# Patient Record
Sex: MALE | Race: WHITE | NOT HISPANIC OR LATINO | Employment: PART TIME | URBAN - METROPOLITAN AREA
[De-identification: names, ages, dates, MRNs, and addresses within clinical notes are randomized per-mention and may not be internally consistent; named-entity substitution may affect disease eponyms.]

---

## 2017-01-23 ENCOUNTER — HOSPITAL ENCOUNTER (OUTPATIENT)
Dept: RADIOLOGY | Facility: HOSPITAL | Age: 72
Discharge: HOME | End: 2017-01-23
Attending: INTERNAL MEDICINE
Payer: MEDICARE

## 2017-01-23 DIAGNOSIS — R74.8 ELEVATED LIVER ENZYMES: ICD-10-CM

## 2017-01-23 PROCEDURE — 76705 ECHO EXAM OF ABDOMEN: CPT | Mod: TC

## 2017-01-23 PROCEDURE — 76705 ECHO EXAM OF ABDOMEN: CPT | Mod: 26,,, | Performed by: RADIOLOGY

## 2017-04-10 ENCOUNTER — HOSPITAL ENCOUNTER (OUTPATIENT)
Dept: CARDIOLOGY | Facility: HOSPITAL | Age: 72
Discharge: HOME | End: 2017-04-10
Attending: INTERNAL MEDICINE
Payer: MEDICARE

## 2017-04-10 DIAGNOSIS — I27.20 PULMONARY HYPERTENSION: ICD-10-CM

## 2017-04-10 LAB
DIASTOLIC DYSFUNCTION: NO
ESTIMATED PA SYSTOLIC PRESSURE: 45.45
GLOBAL PERICARDIAL EFFUSION: ABNORMAL
MITRAL VALVE MOBILITY: NORMAL
RETIRED EF AND QEF - SEE NOTES: 60 (ref 55–65)
TRICUSPID VALVE REGURGITATION: ABNORMAL

## 2017-04-10 PROCEDURE — 93306 TTE W/DOPPLER COMPLETE: CPT | Mod: 26,,, | Performed by: INTERNAL MEDICINE

## 2017-04-10 PROCEDURE — 93306 TTE W/DOPPLER COMPLETE: CPT

## 2017-10-09 PROBLEM — E78.5 HYPERLIPIDEMIA LDL GOAL <100: Status: ACTIVE | Noted: 2017-10-09

## 2017-10-09 PROBLEM — Z23 FLU VACCINE NEED: Status: ACTIVE | Noted: 2017-10-09

## 2017-10-09 PROBLEM — I10 HTN, GOAL BELOW 140/90: Status: ACTIVE | Noted: 2017-10-09

## 2017-10-09 PROBLEM — I27.20 PULMONARY HYPERTENSION: Status: ACTIVE | Noted: 2017-10-09

## 2017-10-09 PROBLEM — R74.8 ELEVATED LIVER ENZYMES: Status: ACTIVE | Noted: 2017-10-09

## 2018-02-19 PROBLEM — Z79.4 UNCONTROLLED TYPE 2 DIABETES MELLITUS WITH HYPERGLYCEMIA, WITH LONG-TERM CURRENT USE OF INSULIN: Status: ACTIVE | Noted: 2018-02-19

## 2018-02-19 PROBLEM — E11.65 UNCONTROLLED TYPE 2 DIABETES MELLITUS WITH HYPERGLYCEMIA, WITH LONG-TERM CURRENT USE OF INSULIN: Status: ACTIVE | Noted: 2018-02-19

## 2018-05-31 ENCOUNTER — PES CALL (OUTPATIENT)
Dept: ADMINISTRATIVE | Facility: CLINIC | Age: 73
End: 2018-05-31

## 2018-06-15 ENCOUNTER — OFFICE VISIT (OUTPATIENT)
Dept: INTERNAL MEDICINE | Facility: CLINIC | Age: 73
End: 2018-06-15
Payer: MEDICARE

## 2018-06-15 VITALS
TEMPERATURE: 98 F | HEART RATE: 75 BPM | DIASTOLIC BLOOD PRESSURE: 74 MMHG | OXYGEN SATURATION: 97 % | SYSTOLIC BLOOD PRESSURE: 138 MMHG | WEIGHT: 229.75 LBS | BODY MASS INDEX: 30.45 KG/M2 | HEIGHT: 73 IN

## 2018-06-15 VITALS
BODY MASS INDEX: 30.62 KG/M2 | HEIGHT: 73 IN | WEIGHT: 231.06 LBS | OXYGEN SATURATION: 98 % | DIASTOLIC BLOOD PRESSURE: 81 MMHG | SYSTOLIC BLOOD PRESSURE: 142 MMHG | TEMPERATURE: 98 F | HEART RATE: 72 BPM

## 2018-06-15 DIAGNOSIS — H91.93 DECREASED HEARING, BILATERAL: ICD-10-CM

## 2018-06-15 DIAGNOSIS — Z00.00 ENCOUNTER FOR PREVENTIVE HEALTH EXAMINATION: Primary | ICD-10-CM

## 2018-06-15 DIAGNOSIS — R73.03 BORDERLINE DIABETES MELLITUS: ICD-10-CM

## 2018-06-15 DIAGNOSIS — K76.0 FATTY LIVER: ICD-10-CM

## 2018-06-15 DIAGNOSIS — R73.09 ELEVATED HEMOGLOBIN A1C: Primary | ICD-10-CM

## 2018-06-15 DIAGNOSIS — Z85.828 HISTORY OF BASAL CELL CARCINOMA: ICD-10-CM

## 2018-06-15 DIAGNOSIS — R79.89 ELEVATED LFTS: ICD-10-CM

## 2018-06-15 DIAGNOSIS — Z87.19 HISTORY OF PANCREATITIS: ICD-10-CM

## 2018-06-15 DIAGNOSIS — E78.5 HYPERLIPIDEMIA LDL GOAL <100: ICD-10-CM

## 2018-06-15 DIAGNOSIS — H04.123 DRY EYES: ICD-10-CM

## 2018-06-15 DIAGNOSIS — I10 ESSENTIAL HYPERTENSION: ICD-10-CM

## 2018-06-15 DIAGNOSIS — I70.0 ATHEROSCLEROSIS OF AORTA: ICD-10-CM

## 2018-06-15 DIAGNOSIS — I27.20 PULMONARY HYPERTENSION: ICD-10-CM

## 2018-06-15 DIAGNOSIS — Z12.5 ENCOUNTER FOR SCREENING FOR MALIGNANT NEOPLASM OF PROSTATE: ICD-10-CM

## 2018-06-15 DIAGNOSIS — Z00.00 ENCOUNTER FOR HEALTH MAINTENANCE EXAMINATION: ICD-10-CM

## 2018-06-15 PROBLEM — Z23 FLU VACCINE NEED: Status: RESOLVED | Noted: 2017-10-09 | Resolved: 2018-06-15

## 2018-06-15 PROCEDURE — 99999 PR PBB SHADOW E&M-EST. PATIENT-LVL V: CPT | Mod: PBBFAC,,, | Performed by: INTERNAL MEDICINE

## 2018-06-15 PROCEDURE — 99499 UNLISTED E&M SERVICE: CPT | Mod: S$GLB,,, | Performed by: NURSE PRACTITIONER

## 2018-06-15 PROCEDURE — 99204 OFFICE O/P NEW MOD 45 MIN: CPT | Mod: S$GLB,,, | Performed by: INTERNAL MEDICINE

## 2018-06-15 PROCEDURE — 99499 UNLISTED E&M SERVICE: CPT | Mod: S$GLB,,, | Performed by: INTERNAL MEDICINE

## 2018-06-15 PROCEDURE — 3075F SYST BP GE 130 - 139MM HG: CPT | Mod: CPTII,S$GLB,, | Performed by: NURSE PRACTITIONER

## 2018-06-15 PROCEDURE — 3074F SYST BP LT 130 MM HG: CPT | Mod: CPTII,S$GLB,, | Performed by: INTERNAL MEDICINE

## 2018-06-15 PROCEDURE — 3078F DIAST BP <80 MM HG: CPT | Mod: CPTII,S$GLB,, | Performed by: NURSE PRACTITIONER

## 2018-06-15 PROCEDURE — G0439 PPPS, SUBSEQ VISIT: HCPCS | Mod: S$GLB,,, | Performed by: NURSE PRACTITIONER

## 2018-06-15 PROCEDURE — 99999 PR PBB SHADOW E&M-EST. PATIENT-LVL IV: CPT | Mod: PBBFAC,,, | Performed by: NURSE PRACTITIONER

## 2018-06-15 PROCEDURE — 3079F DIAST BP 80-89 MM HG: CPT | Mod: CPTII,S$GLB,, | Performed by: INTERNAL MEDICINE

## 2018-06-15 RX ORDER — LOSARTAN POTASSIUM 25 MG/1
25 TABLET ORAL DAILY
Qty: 90 TABLET | Refills: 3 | Status: SHIPPED | OUTPATIENT
Start: 2018-06-15 | End: 2018-09-14 | Stop reason: SINTOL

## 2018-06-15 NOTE — PROGRESS NOTES
Subjective:       Patient ID: Ezekiel Mueller is a 72 y.o. male.    Chief Complaint: Establish Care    HPI  73 y/o man with HTN, HLD, borderline diabetes, h/o basal cell carcinoma of eyelid here to establish care. Last saw Dr Leija 2/2018.  [facility] doesn't recall which provider, not in several years.  No acute issues.    Borderline diabetes - last A1c 6.3; elevated A1c on labs since 2016, never higher than 6.3.  On metformin 500mg BID (sometimes misses evening dose) - this was stopped due to elevated LFTs and then restarted.  Not following low-carb/healthy diet.   Used to walk 5 miles daily about 3 years ago but not since his hospitalization in 2015.  Walks more in the winter and when hunting, but not during the summer.    HTN - diagnosed just after [place] (~2005). On norvasc 10mg and HCTZ 25mg.  Used to be on lisinopril and another medication.     H/o pancreatitis 7/2015 - noted as possible gallstone pancreatitis on that admission, but patient had cholecystectomy in 2012; EUS performed during hospitalization did not show any stones in the biliary tree, so no ERCP was performed. Hospital course complicated by two episodes of hypoxic respiratory failure attributed to pulmonary edema related to pancreatitis / IV fluids given for treatment of pancreatitis.    Does have fatty liver with mild LFT elevation on last few sets of labs.    H/o olecranon bursitis - attributes to traumatic injury, this has resolved.    H/o BCC R eyelid, excision done 2/2015, hasn't seen dermatology since then. No new lesions that he has noted.    Reports that he [facility] about 10 [facility] ago which was normal.    No difficulty urinating.  Sleeps well at night.  Hasn't had Hep C screening  Reports he has [facility] in [facility] - last around 5 years ago or so.  Reports having had 2 polyps on the first c-scope, no polyps on second/most recent scope.  Thinks maybe told to repeat in  3 years.  May have had one pneumonia vaccine at [facility] but isn't sure    Review of Systems   Constitutional: Negative for activity change and unexpected weight change.   HENT: Negative for hearing loss, rhinorrhea and trouble swallowing.    Eyes: Negative for discharge and visual disturbance.   Respiratory: Negative for chest tightness and wheezing.    Cardiovascular: Negative for chest pain and palpitations.   Gastrointestinal: Negative for blood in stool, constipation, diarrhea and vomiting.   Endocrine: Negative for polydipsia and polyuria.   Genitourinary: Negative for difficulty urinating, hematuria and urgency.   Musculoskeletal: Negative for arthralgias, joint swelling and neck pain.   Neurological: Negative for weakness and headaches.   Psychiatric/Behavioral: Negative for confusion and dysphoric mood.         Past Medical History:   Diagnosis Date    Basal cell carcinoma 02/2015    R eyelid    Borderline diabetes 2016    Colon polyps     noted [facility], none on repeat    Elevated blood uric acid level 2016    Fatty liver     Hypertension     diagnosed 2005    Pancreatitis 07/2015    etiology unclear     Past Surgical History:   Procedure Laterality Date    CHOLECYSTECTOMY  2012    SKIN CANCER EXCISION       Family History   Problem Relation Age of Onset    Diabetes Mother     Heart disease Mother     Hypertension Mother     No Known Problems Father     No Known Problems Sister     No Known Problems Brother     No Known Problems Maternal Aunt     No Known Problems Maternal Uncle     No Known Problems Paternal Aunt     No Known Problems Paternal Uncle     No Known Problems Maternal Grandmother     No Known Problems Maternal Grandfather     No Known Problems Paternal Grandmother     No Known Problems Paternal Grandfather     Blindness Neg Hx     Glaucoma Neg Hx     Macular degeneration Neg Hx     Retinal detachment Neg Hx     Amblyopia Neg Hx     Cancer Neg Hx  "    Cataracts Neg Hx     Strabismus Neg Hx     Stroke Neg Hx     Thyroid disease Neg Hx        Social History   Substance Use Topics    Smoking status: Never Smoker    Smokeless tobacco: Never Used    Alcohol use No     Medications and allergies reviewed.     Objective:          Vitals:    06/15/18 1420   BP: (!) 142/81   BP Location: Left arm   Patient Position: Sitting   Pulse: 72   Temp: 97.7 °F (36.5 °C)   TempSrc: Oral   SpO2: 98%   Weight: 104.8 kg (231 lb 0.7 oz)   Height: 6' 1" (1.854 m)   Repeat BP consistent    Body mass index is 30.48 kg/m².  Physical Exam   Constitutional: He is oriented to person, place, and time. He appears well-developed and well-nourished. No distress.   HENT:   Head: Normocephalic and atraumatic.   Mouth/Throat: Oropharynx is clear and moist.   Eyes: Conjunctivae and EOM are normal. Pupils are equal, round, and reactive to light. No scleral icterus.   Neck: Neck supple.   Cardiovascular: Normal rate, regular rhythm, normal heart sounds and intact distal pulses.    No murmur heard.  Pulmonary/Chest: Effort normal and breath sounds normal. No respiratory distress.   Abdominal: Soft. Bowel sounds are normal. He exhibits no distension. There is no tenderness.   Musculoskeletal: He exhibits no edema or tenderness.   Lymphadenopathy:     He has no cervical adenopathy.   Neurological: He is alert and oriented to person, place, and time. No cranial nerve deficit or sensory deficit.   Skin: Skin is warm and dry.   Psychiatric: He has a normal mood and affect.   Vitals reviewed.      Lab Results   Component Value Date    WBC 6.3 10/09/2017    HGB 15.1 10/09/2017    HCT 45.1 10/09/2017     10/09/2017    CHOL 197 10/09/2017    TRIG 126 10/09/2017    HDL 50 10/09/2017    ALT 86 (H) 10/09/2017    AST 57 (H) 10/09/2017     10/09/2017    K 3.8 10/09/2017    CL 99 10/09/2017    CREATININE 1.01 10/09/2017    BUN 17 10/09/2017    CO2 27 10/09/2017    INR 1.0 04/16/2015    HGBA1C " 6.3 (H) 10/09/2017       Assessment:       1. Elevated hemoglobin A1c    2. Essential hypertension    3. Fatty liver    4. Elevated LFTs    5. History of pancreatitis    6. History of basal cell carcinoma    7. Dry eyes    8. Encounter for health maintenance examination    9. Encounter for screening for malignant neoplasm of prostate        Plan:   Ezekiel was seen today for establish care.    Diagnoses and all orders for this visit:    Reviewed record and previous diagnoses with patient, educated re: borderline diabetes, fatty liver. Counseled re: healthy high-fiber low-carb diet and increasing exercise.    Elevated hemoglobin A1c - on metformin, continue this for now, will monitor.   -     CBC Without Differential; Future  -     Hemoglobin A1c; Future    Essential hypertension - above goal; discussed options. Recommended monitor at home, bring in BP log to next visit. Will start low-dose losartan  -     Lipid panel; Future  -     losartan (COZAAR) 25 MG tablet; Take 1 tablet (25 mg total) by mouth once daily. For blood pressure  -     Basic metabolic panel; Future    Fatty liver - concern for NAFLD given hepatic steatosis + elevated LFTs. Recommended see hepatology for further evaluation and fibroscan  -     CBC Without Differential; Future  -     Lipid panel; Future  -     Ambulatory Referral to Hepatology    Elevated LFTs  -     CBC Without Differential; Future  -     Comprehensive metabolic panel; Future  -     Lipid panel; Future  -     Ambulatory Referral to Hepatology    History of pancreatitis - reviewed in chart as noted, unclear etiology    History of basal cell carcinoma - recommended regular skin checks with dermatologist, will refer  -     Ambulatory Referral to Dermatology    Encounter for health maintenance examination  -     Hepatitis C antibody; Future  -     PSA, Screening; Future    Encounter for screening for malignant neoplasm of prostate - discussed guidelines for prostate cancer screening; will  do PSA with next labs and plan for prostate exam next visit.  -     PSA, Screening; Future    Health maintenance reviewed with patient.   Outside records of vaccines, colonoscopy, [facility]  Labs except for lipids, BMP in 1-2 weeks; lipid panel + BMP in 3 months  Follow-up in about 3 months (around 9/15/2018) for hypertension, ASCVD risk, annual physical.    Al Harvey MD  Internal Medicine  [facility]  6/15/2018

## 2018-06-15 NOTE — PATIENT INSTRUCTIONS

## 2018-06-15 NOTE — Clinical Note
Kandi - this patient says he has had [facility]. Can you help to get these records?  Thanks! Al Harvey MD

## 2018-06-15 NOTE — PATIENT INSTRUCTIONS
Counseling and Referral of Other Preventative  (Italic type indicates deductible and co-insurance are waived)    Patient Name: Ezekiel Mueller  Today's Date: 6/15/2018    Health Maintenance       Date Due Completion Date    Hepatitis C Screening 1945 ---    TETANUS VACCINE 10/27/1964 ---    Colonoscopy 10/27/1996 ---    Zoster Vaccine 10/27/2006 ---    Pneumococcal (65+) (1 of 2 - PCV13) 10/27/2011 ---    Influenza Vaccine 08/01/2018 10/9/2017    Lipid Panel 10/09/2022 10/9/2017        No orders of the defined types were placed in this encounter.    The following information is provided to all patients.  This information is to help you find resources for any of the problems found today that may be affecting your health:                Living healthy guide: www.[facility].[place].gov      Understanding Diabetes: www.diabetes.org      Eating healthy: www.cdc.gov/healthyweight      CDC home safety checklist: www.cdc.gov/steadi/patient.html      Agency on Aging: www.goea.[place].gov      Alcoholics anonymous (AA): www.aa.org      Physical Activity: www.ghazala.nih.gov/qg0mjud      Tobacco use: www.quitwithusla.org

## 2018-06-18 PROBLEM — I70.0 ATHEROSCLEROSIS OF AORTA: Status: ACTIVE | Noted: 2018-06-18

## 2018-06-18 NOTE — PROGRESS NOTES
"Ezekiel Mueller presented for a  Medicare AWV and comprehensive Health Risk Assessment today. The following components were reviewed and updated:    · Medical history  · Family History  · Social history  · Allergies and Current Medications  · Health Risk Assessment  · Health Maintenance  · Care Team     ** See Completed Assessments for Annual Wellness Visit within the encounter summary.**       The following assessments were completed:  · Living Situation  · CAGE  · Depression Screening  · Timed Get Up and Go  · Whisper Test  · Cognitive Function Screening  ·   ·   · Nutrition Screening  · ADL Screening  · PAQ Screening    Vitals:    06/15/18 1608   BP: 138/74   Pulse: 75   Temp: 98 °F (36.7 °C)   SpO2: 97%   Weight: 104.2 kg (229 lb 11.5 oz)   Height: 6' 1" (1.854 m)     Body mass index is 30.31 kg/m².  Physical Exam   Constitutional: He is oriented to person, place, and time. He appears well-developed and well-nourished.   HENT:   Head: Normocephalic and atraumatic.   Nose: Nose normal.   Eyes: Conjunctivae and EOM are normal.   Cardiovascular: Normal rate, regular rhythm, normal heart sounds and intact distal pulses.    Pulmonary/Chest: Effort normal and breath sounds normal.   Musculoskeletal: Normal range of motion.   Neurological: He is alert and oriented to person, place, and time.   Skin: Skin is warm and dry.   Psychiatric: He has a normal mood and affect. His behavior is normal. Judgment and thought content normal.   Nursing note and vitals reviewed.        Diagnoses and health risks identified today and associated recommendations/orders:    1. Encounter for preventive health examination  Assessment performed. Health maintenance updated. Chart review completed.  Discussed vaccines due. Labs ordered by PCP. Discussed colonoscopy due.    2. Decreased hearing, bilateral  - Ambulatory Referral to Audiology    3. Pulmonary hypertension  Chronic. Stable. Followed by PCP.  2 D Echo 2017.    4. Essential " hypertension  Chronic. Stable on current regimen. Followed by PCP.    5. Borderline diabetes mellitus  Chronic. Stable on current regimen. Followed by PCP.    6. History of pancreatitis  Chronic. Stable on current regimen. Followed by PCP.    7. History of basal cell carcinoma  Chronic. Stable on current regimen. Followed by Dermatology.    8. Hyperlipidemia LDL goal <100  Chronic. Stable on current regimen. Followed by PCP.    9. Atherosclerosis of aorta  Noted on imaging. Continue on current regimen. Followed by PCP.      Provided Ezekiel with a 5-10 year written screening schedule and personal prevention plan. Recommendations were developed using the USPSTF age appropriate recommendations. Education, counseling, and referrals were provided as needed. After Visit Summary printed and given to patient which includes a list of additional screenings\tests needed.    Follow-up for follow up with Primary Care Provider as instructed, ;sooner if problems, HRA in 1 year.    KIRK Galvin

## 2018-06-25 ENCOUNTER — LAB VISIT (OUTPATIENT)
Dept: LAB | Facility: HOSPITAL | Age: 73
End: 2018-06-25
Attending: INTERNAL MEDICINE
Payer: MEDICARE

## 2018-06-25 DIAGNOSIS — R73.09 ELEVATED HEMOGLOBIN A1C: ICD-10-CM

## 2018-06-25 DIAGNOSIS — Z00.00 ENCOUNTER FOR HEALTH MAINTENANCE EXAMINATION: ICD-10-CM

## 2018-06-25 DIAGNOSIS — R79.89 ELEVATED LFTS: ICD-10-CM

## 2018-06-25 DIAGNOSIS — Z12.5 ENCOUNTER FOR SCREENING FOR MALIGNANT NEOPLASM OF PROSTATE: ICD-10-CM

## 2018-06-25 DIAGNOSIS — K76.0 FATTY LIVER: ICD-10-CM

## 2018-06-25 LAB
ALBUMIN SERPL BCP-MCNC: 4.5 G/DL
ALP SERPL-CCNC: 49 U/L
ALT SERPL W/O P-5'-P-CCNC: 53 U/L
ANION GAP SERPL CALC-SCNC: 10 MMOL/L
AST SERPL-CCNC: 40 U/L
BILIRUB SERPL-MCNC: 0.6 MG/DL
BUN SERPL-MCNC: 16 MG/DL
CALCIUM SERPL-MCNC: 10.1 MG/DL
CHLORIDE SERPL-SCNC: 102 MMOL/L
CO2 SERPL-SCNC: 29 MMOL/L
COMPLEXED PSA SERPL-MCNC: 0.51 NG/ML
CREAT SERPL-MCNC: 1.1 MG/DL
ERYTHROCYTE [DISTWIDTH] IN BLOOD BY AUTOMATED COUNT: 13.8 %
EST. GFR  (AFRICAN AMERICAN): >60 ML/MIN/1.73 M^2
EST. GFR  (NON AFRICAN AMERICAN): >60 ML/MIN/1.73 M^2
ESTIMATED AVG GLUCOSE: 123 MG/DL
GLUCOSE SERPL-MCNC: 121 MG/DL
HBA1C MFR BLD HPLC: 5.9 %
HCT VFR BLD AUTO: 43.6 %
HCV AB SERPL QL IA: NEGATIVE
HGB BLD-MCNC: 14.8 G/DL
MCH RBC QN AUTO: 28.1 PG
MCHC RBC AUTO-ENTMCNC: 33.9 G/DL
MCV RBC AUTO: 83 FL
PLATELET # BLD AUTO: 197 K/UL
PMV BLD AUTO: 12.1 FL
POTASSIUM SERPL-SCNC: 3.8 MMOL/L
PROT SERPL-MCNC: 7.8 G/DL
RBC # BLD AUTO: 5.26 M/UL
SODIUM SERPL-SCNC: 141 MMOL/L
WBC # BLD AUTO: 6 K/UL

## 2018-06-25 PROCEDURE — 86803 HEPATITIS C AB TEST: CPT

## 2018-06-25 PROCEDURE — 83036 HEMOGLOBIN GLYCOSYLATED A1C: CPT

## 2018-06-25 PROCEDURE — 84153 ASSAY OF PSA TOTAL: CPT

## 2018-06-25 PROCEDURE — 36415 COLL VENOUS BLD VENIPUNCTURE: CPT

## 2018-06-25 PROCEDURE — 80053 COMPREHEN METABOLIC PANEL: CPT

## 2018-06-25 PROCEDURE — 85027 COMPLETE CBC AUTOMATED: CPT

## 2018-06-26 ENCOUNTER — LAB VISIT (OUTPATIENT)
Dept: LAB | Facility: HOSPITAL | Age: 73
End: 2018-06-26
Payer: MEDICARE

## 2018-06-26 ENCOUNTER — INITIAL CONSULT (OUTPATIENT)
Dept: DERMATOLOGY | Facility: CLINIC | Age: 73
End: 2018-06-26
Payer: MEDICARE

## 2018-06-26 ENCOUNTER — INITIAL CONSULT (OUTPATIENT)
Dept: HEPATOLOGY | Facility: CLINIC | Age: 73
End: 2018-06-26
Payer: MEDICARE

## 2018-06-26 VITALS
RESPIRATION RATE: 18 BRPM | HEIGHT: 73 IN | DIASTOLIC BLOOD PRESSURE: 85 MMHG | SYSTOLIC BLOOD PRESSURE: 159 MMHG | HEART RATE: 69 BPM | TEMPERATURE: 96 F | OXYGEN SATURATION: 96 % | WEIGHT: 224.19 LBS | BODY MASS INDEX: 29.71 KG/M2

## 2018-06-26 DIAGNOSIS — Z12.83 SCREENING EXAM FOR SKIN CANCER: Primary | ICD-10-CM

## 2018-06-26 DIAGNOSIS — L82.0 INFLAMED SEBORRHEIC KERATOSIS: ICD-10-CM

## 2018-06-26 DIAGNOSIS — R74.8 ELEVATED LIVER ENZYMES: Primary | ICD-10-CM

## 2018-06-26 DIAGNOSIS — R74.8 ELEVATED LIVER ENZYMES: ICD-10-CM

## 2018-06-26 DIAGNOSIS — Z85.828 HISTORY OF NONMELANOMA SKIN CANCER: ICD-10-CM

## 2018-06-26 DIAGNOSIS — K76.0 FATTY (CHANGE OF) LIVER, NOT ELSEWHERE CLASSIFIED: ICD-10-CM

## 2018-06-26 LAB
ALBUMIN SERPL BCP-MCNC: 4.4 G/DL
ALP SERPL-CCNC: 54 U/L
ALT SERPL W/O P-5'-P-CCNC: 55 U/L
ANION GAP SERPL CALC-SCNC: 9 MMOL/L
AST SERPL-CCNC: 39 U/L
BASOPHILS # BLD AUTO: 0.05 K/UL
BASOPHILS NFR BLD: 0.8 %
BILIRUB DIRECT SERPL-MCNC: 0.2 MG/DL
BILIRUB SERPL-MCNC: 0.5 MG/DL
BUN SERPL-MCNC: 19 MG/DL
CALCIUM SERPL-MCNC: 10.2 MG/DL
CERULOPLASMIN SERPL-MCNC: 27 MG/DL
CHLORIDE SERPL-SCNC: 103 MMOL/L
CO2 SERPL-SCNC: 29 MMOL/L
CREAT SERPL-MCNC: 1 MG/DL
DIFFERENTIAL METHOD: NORMAL
EOSINOPHIL # BLD AUTO: 0.3 K/UL
EOSINOPHIL NFR BLD: 4.1 %
ERYTHROCYTE [DISTWIDTH] IN BLOOD BY AUTOMATED COUNT: 13.3 %
EST. GFR  (AFRICAN AMERICAN): >60 ML/MIN/1.73 M^2
EST. GFR  (NON AFRICAN AMERICAN): >60 ML/MIN/1.73 M^2
GLUCOSE SERPL-MCNC: 109 MG/DL
HBV CORE AB SERPL QL IA: NEGATIVE
HBV SURFACE AG SERPL QL IA: NEGATIVE
HCT VFR BLD AUTO: 44 %
HEPATITIS A ANTIBODY, IGG: POSITIVE
HGB BLD-MCNC: 14.8 G/DL
IGG SERPL-MCNC: 1391 MG/DL
IMM GRANULOCYTES # BLD AUTO: 0.01 K/UL
IMM GRANULOCYTES NFR BLD AUTO: 0.2 %
INR PPP: 1
IRON SERPL-MCNC: 68 UG/DL
LYMPHOCYTES # BLD AUTO: 1.8 K/UL
LYMPHOCYTES NFR BLD: 28.3 %
MCH RBC QN AUTO: 28.7 PG
MCHC RBC AUTO-ENTMCNC: 33.6 G/DL
MCV RBC AUTO: 85 FL
MONOCYTES # BLD AUTO: 0.7 K/UL
MONOCYTES NFR BLD: 10.3 %
NEUTROPHILS # BLD AUTO: 3.6 K/UL
NEUTROPHILS NFR BLD: 56.3 %
NRBC BLD-RTO: 0 /100 WBC
PLATELET # BLD AUTO: 190 K/UL
PMV BLD AUTO: 11.7 FL
POTASSIUM SERPL-SCNC: 4.1 MMOL/L
PROT SERPL-MCNC: 8.2 G/DL
PROTHROMBIN TIME: 10.8 SEC
RBC # BLD AUTO: 5.16 M/UL
SATURATED IRON: 17 %
SODIUM SERPL-SCNC: 141 MMOL/L
TOTAL IRON BINDING CAPACITY: 400 UG/DL
TRANSFERRIN SERPL-MCNC: 270 MG/DL
WBC # BLD AUTO: 6.33 K/UL

## 2018-06-26 PROCEDURE — 86790 VIRUS ANTIBODY NOS: CPT

## 2018-06-26 PROCEDURE — 80053 COMPREHEN METABOLIC PANEL: CPT

## 2018-06-26 PROCEDURE — 17110 DESTRUCTION B9 LES UP TO 14: CPT | Mod: S$GLB,,, | Performed by: DERMATOLOGY

## 2018-06-26 PROCEDURE — 80321 ALCOHOLS BIOMARKERS 1OR 2: CPT

## 2018-06-26 PROCEDURE — 82390 ASSAY OF CERULOPLASMIN: CPT

## 2018-06-26 PROCEDURE — 86704 HEP B CORE ANTIBODY TOTAL: CPT

## 2018-06-26 PROCEDURE — 86235 NUCLEAR ANTIGEN ANTIBODY: CPT

## 2018-06-26 PROCEDURE — 99203 OFFICE O/P NEW LOW 30 MIN: CPT | Mod: 25,S$GLB,, | Performed by: DERMATOLOGY

## 2018-06-26 PROCEDURE — 86038 ANTINUCLEAR ANTIBODIES: CPT

## 2018-06-26 PROCEDURE — 82248 BILIRUBIN DIRECT: CPT

## 2018-06-26 PROCEDURE — 87340 HEPATITIS B SURFACE AG IA: CPT

## 2018-06-26 PROCEDURE — 3078F DIAST BP <80 MM HG: CPT | Mod: CPTII,S$GLB,, | Performed by: DERMATOLOGY

## 2018-06-26 PROCEDURE — 85610 PROTHROMBIN TIME: CPT

## 2018-06-26 PROCEDURE — 99214 OFFICE O/P EST MOD 30 MIN: CPT | Mod: S$GLB,,, | Performed by: PHYSICIAN ASSISTANT

## 2018-06-26 PROCEDURE — 3079F DIAST BP 80-89 MM HG: CPT | Mod: CPTII,S$GLB,, | Performed by: PHYSICIAN ASSISTANT

## 2018-06-26 PROCEDURE — 85025 COMPLETE CBC W/AUTO DIFF WBC: CPT

## 2018-06-26 PROCEDURE — 82103 ALPHA-1-ANTITRYPSIN TOTAL: CPT

## 2018-06-26 PROCEDURE — 86256 FLUORESCENT ANTIBODY TITER: CPT | Mod: 91

## 2018-06-26 PROCEDURE — 36415 COLL VENOUS BLD VENIPUNCTURE: CPT

## 2018-06-26 PROCEDURE — 82784 ASSAY IGA/IGD/IGG/IGM EACH: CPT

## 2018-06-26 PROCEDURE — 86706 HEP B SURFACE ANTIBODY: CPT

## 2018-06-26 PROCEDURE — 99999 PR PBB SHADOW E&M-EST. PATIENT-LVL II: CPT | Mod: PBBFAC,,, | Performed by: DERMATOLOGY

## 2018-06-26 PROCEDURE — 3077F SYST BP >= 140 MM HG: CPT | Mod: CPTII,S$GLB,, | Performed by: PHYSICIAN ASSISTANT

## 2018-06-26 PROCEDURE — 3077F SYST BP >= 140 MM HG: CPT | Mod: CPTII,S$GLB,, | Performed by: DERMATOLOGY

## 2018-06-26 PROCEDURE — 83540 ASSAY OF IRON: CPT

## 2018-06-26 PROCEDURE — 99999 PR PBB SHADOW E&M-EST. PATIENT-LVL IV: CPT | Mod: PBBFAC,,, | Performed by: PHYSICIAN ASSISTANT

## 2018-06-26 RX ORDER — ASPIRIN 81 MG/1
81 TABLET ORAL DAILY
COMMUNITY
End: 2022-01-28

## 2018-06-26 NOTE — LETTER
June 26, 2018      Al Harvey MD  [address]  [place] [address]           [facility] - Dermatology  [address]  [place] [address]  Phone: [phone]  Fax: [phone]          Patient: Ezekiel Mueller   MR Number: [id number]   YOB: 1945   Date of Visit: 6/26/2018       Dear Dr. Al Harvey:    Thank you for referring Ezekiel Mueller to me for evaluation. Attached you will find relevant portions of my assessment and plan of care.    If you have questions, please do not hesitate to call me. I look forward to following Ezekiel Mueller along with you.    Sincerely,    Jeny Kelly MD    Enclosure  CC:  No Recipients    If you would like to receive this communication electronically, please contact [email or web address] or [phone] to request more information on [employer] access.    For providers and/or their staff who would like to refer a patient to [facility], please contact us through our one-stop-shop provider referral line, [facility], at [phone].    If you feel you have received this communication in error or would no longer like to receive these types of communications, please e-mail [email or web address]

## 2018-06-26 NOTE — LETTER
June 28, 2018      Al Harvey MD  [address]  [facility] [address]           [facility] - Hepatitis [facility]  [address]  [facility] [address]  Phone: [phone]  Fax: [phone]          Patient: Ezekiel Mueller   MR Number: [id number]   YOB: 1945   Date of Visit: 6/26/2018       Dear Dr. Al Harvey:    Thank you for referring Ezekiel Mueller to me for evaluation. Attached you will find relevant portions of my assessment and plan of care.    If you have questions, please do not hesitate to call me. I look forward to following Ezekiel Mueller along with you.    Sincerely,    GIANLUCA Chunosure  CC:  No Recipients    If you would like to receive this communication electronically, please contact [email or web address] or [phone] to request more information on [employer] access.    For providers and/or their staff who would like to refer a patient to [facility], please contact us through our one-stop-shop provider referral line, [facility], at [phone].    If you feel you have received this communication in error or would no longer like to receive these types of communications, please e-mail [email or web address]

## 2018-06-26 NOTE — PROGRESS NOTES
HEPATOLOGY CLINIC VISIT NOTE     REFERRING PROVIDER: Dr. Al Harvey    REASON FOR VISIT: elevated liver enzymes     HISTORY: This is a 72 y.o. White male here for initial evaluation. He was referred by PCP. He has a h/o elevated transaminases since 02/2016. On most recent labs, AST 40, ALT 53. He has normal synthetic liver function. His PLTs are preserved. He has had negative HCV screening in the past. He denies drug use. He denies ETOH use. He denies FH of liver disease.     He had an U/S 01/2017 noting hepatic steatosis. He has not had formal fibrosis staging.     PMH is significant for HTN, HLD, and prediabetes. His BMI is 29.     Liver staging:  No formal staging  AST 40, ALT 53  Tbili WNL  Albumin WNL  PLTs 190    Denies jaundice, dark urine, abdominal distention, hematemesis, melena, slowed mentation.   No abnormal skin rashes. No generalized joint pain.                   Past Medical History:   Diagnosis Date    Basal cell carcinoma 02/2015    R eyelid    Borderline diabetes 2016    Colon polyps     [facility], none on repeat    Elevated blood uric acid level 2016    Fatty liver     Hypertension     diagnosed 2005    Pancreatitis 07/2015    etiology unclear     Past Surgical History:   Procedure Laterality Date    CHOLECYSTECTOMY  2012    COLONOSCOPY      x2    SKIN CANCER EXCISION       FAMILY HISTORY: Negative for liver disease    SOCIAL HISTORY:   History   Smoking Status    Never Smoker   Smokeless Tobacco    Never Used     History   Alcohol Use No     History   Drug Use No     ROS:   No fever, chills, (+) intentional weight loss  No chest pain, dyspnea, cough  No abdominal pain, nausea, vomiting  No headaches, visual changes  No lower extremity edema  No depression or anxiety      PHYSICAL EXAM:  Friendly White male, in no acute distress; alert and oriented to person, place and time  VITALS: reviewed  HEENT: Sclerae anicteric.   NECK: Supple  CVS: Regular  rate and rhythm. No murmurs  LUNGS: Normal respiratory effort. Clear bilaterally  ABDOMEN: Flat, soft, nontender. No organomegaly or masses. No ascites or hernias.   SKIN: Warm and dry. No jaundice, No obvious rashes.   EXTREMITIES: No lower extremity edema  NEURO/PSYCH: Normal gate. Memory intact. Thought and speech pattern appropriate. Behavior normal. No depression or anxiety noted.    RECENT LABS:  Lab Results   Component Value Date    WBC 6.00 06/25/2018    HGB 14.8 06/25/2018     06/25/2018     Lab Results   Component Value Date    INR 1.0 04/16/2015     Lab Results   Component Value Date    AST 40 06/25/2018    ALT 53 (H) 06/25/2018    BILITOT 0.6 06/25/2018    ALBUMIN 4.5 06/25/2018    ALKPHOS 49 (L) 06/25/2018    CREATININE 1.1 06/25/2018    BUN 16 06/25/2018     06/25/2018    K 3.8 06/25/2018     RECENT IMAGING:  U/S abdomen 01/2017Narrative     Limited abdominal ultrasound    Comparison: None    Results: The liver is enlarged in size measuring 18.8 cm.  It demonstrates increased echogenicity throughout and attenuates the sound consistent with fatty liver infiltration.The common duct measures 4 mm , which is normal.  The gallbladder has been removed.  The pancreas is obscured by overlying intestinal gas.  The right kidney measures 11.9 x 6.6 x 6.0  cm.  There is a small cyst in the lower pole measuring 1.6 cm.  No ascites.   Impression          1. Cholecystectomy.  2.  Mild hepatomegaly and fatty liver infiltration.  3.  Small right kidney cyst.     ASSESSMENT  72 y.o. White male with:  1. ELEVATED LIVER ENZYMES  -- suspect NAFLD/ALANIS  -- full serological work up  -- stage fibrosis with fibroscan  -- risk factors: prediabetes, HTN, and HLD  -- discussed diet, exercise and weight loss    EDUCATION:  We discussed the manifestations of NAFLD. At this time there is no FDA approved therapy for NAFLD.   The patient and I discussed the importance of diet, exercise, and weight loss for management of  NAFLD. We discussed a low fat, low carb/low sugar, high fiber diet, and a goal of 30 minutes of exercise 5 times per week.   Pt is aware that fatty liver can progress to steatohepatitis and possibly to cirrhosis, putting one at increased risk for liver cancer, liver failure, and death.        PLAN:  1. Labs today  2. Fibroscan  3. F/u TBD based on results     Thank you for allowing me to participate in the care of Ezekiel Berry PA-C

## 2018-06-26 NOTE — PROGRESS NOTES
Subjective:       Patient ID:  Ezekiel Mueller is a 72 y.o. male who presents for   Chief Complaint   Patient presents with    Skin Check     tbse     HPI  73 yo male history of right eyelid BCC treated with Mohs 2015 here for skin check.  The patient denies any moles or growths of the skin that are rapidly growing, hurting, itching, bleeding, or changing colors.  Has 2 itchy spots near temples on both sides would like removed.    Review of Systems   Skin: Positive for activity-related sunscreen use and wears hat.   Hematologic/Lymphatic: Does not bruise/bleed easily.        Objective:    Physical Exam   Constitutional: He appears well-developed and well-nourished. No distress.   Neurological: He is alert and oriented to person, place, and time. He is not disoriented.   Psychiatric: He has a normal mood and affect.   Skin:   Areas Examined (abnormalities noted in diagram):   Scalp / Hair Palpated and Inspected  Head / Face Inspection Performed  Neck Inspection Performed  Chest / Axilla Inspection Performed  Abdomen Inspection Performed  Genitals / Buttocks / Groin Inspection Performed  Back Inspection Performed  RUE Inspected  LUE Inspection Performed  RLE Inspected  LLE Inspection Performed  Nails and Digits Inspection Performed                   Diagram Legend     Erythematous scaling macule/papule c/w actinic keratosis       Vascular papule c/w angioma      Pigmented verrucoid papule/plaque c/w seborrheic keratosis      Yellow umbilicated papule c/w sebaceous hyperplasia      Irregularly shaped tan macule c/w lentigo     1-2 mm smooth white papules consistent with Milia      Movable subcutaneous cyst with punctum c/w epidermal inclusion cyst      Subcutaneous movable cyst c/w pilar cyst      Firm pink to brown papule c/w dermatofibroma      Pedunculated fleshy papule(s) c/w skin tag(s)      Evenly pigmented macule c/w junctional nevus     Mildly variegated pigmented, slightly irregular-bordered macule c/w  mildly atypical nevus      Flesh colored to evenly pigmented papule c/w intradermal nevus       Pink pearly papule/plaque c/w basal cell carcinoma      Erythematous hyperkeratotic cursted plaque c/w SCC      Surgical scar with no sign of skin cancer recurrence      Open and closed comedones      Inflammatory papules and pustules      Verrucoid papule consistent consistent with wart     Erythematous eczematous patches and plaques     Dystrophic onycholytic nail with subungual debris c/w onychomycosis     Umbilicated papule    Erythematous-base heme-crusted tan verrucoid plaque consistent with inflamed seborrheic keratosis     Erythematous Silvery Scaling Plaque c/w Psoriasis     See annotation      Assessment / Plan:        Screening exam for skin cancer  Area of previous NMSC examined. Site well healed with no signs of recurrence.    Total body skin examination performed today including at least 12 points as noted in physical examination. No lesions suspicious for malignancy noted.    History of nonmelanoma skin cancer  Patient instructed in importance in daily sun protection of at least spf 30. Sun avoidance and topical protection discussed.     Patient encouraged to wear hat for all outdoor exposure.     Also discussed sun protective clothing.      Inflamed seborrheic keratosis  Cryosurgery procedure note:    Verbal consent from the patient is obtained including, but not limited to, risk of hypopigmentation/hyperpigmentation, scar, recurrence of lesion. Liquid nitrogen cryosurgery is applied to 2 lesions to produce a freeze injury. The patient is aware that blisters may form and is instructed on wound care with gentle cleansing and use of vaseline ointment to keep moist until healed. The patient is supplied a handout on cryosurgery and is instructed to call if lesions do not completely resolve.             Follow-up in about 1 year (around 6/26/2019) for for TBSE.

## 2018-06-27 LAB
ANA SER QL IF: NORMAL
HBV SURFACE AB SER-ACNC: NEGATIVE M[IU]/ML
MITOCHONDRIA AB TITR SER IF: NORMAL {TITER}

## 2018-06-28 LAB
A1AT PHENOTYP SERPL-IMP: NORMAL BANDS
A1AT SERPL NEPH-MCNC: 136 MG/DL
SMOOTH MUSCLE AB TITR SER IF: NORMAL {TITER}

## 2018-06-29 DIAGNOSIS — Z12.11 COLON CANCER SCREENING: ICD-10-CM

## 2018-07-05 LAB — PHOSPHATIDYLETHANOL (PETH): NEGATIVE NG/ML

## 2018-07-10 ENCOUNTER — PROCEDURE VISIT (OUTPATIENT)
Dept: HEPATOLOGY | Facility: CLINIC | Age: 73
End: 2018-07-10
Attending: PHYSICIAN ASSISTANT
Payer: MEDICARE

## 2018-07-10 ENCOUNTER — PATIENT MESSAGE (OUTPATIENT)
Dept: HEPATOLOGY | Facility: CLINIC | Age: 73
End: 2018-07-10

## 2018-07-10 DIAGNOSIS — R74.8 ELEVATED LIVER ENZYMES: ICD-10-CM

## 2018-07-10 PROCEDURE — 91200 LIVER ELASTOGRAPHY: CPT | Mod: S$GLB,,, | Performed by: PHYSICIAN ASSISTANT

## 2018-07-10 NOTE — PROCEDURES
Procedures   Fibroscan Procedure     Name: Ezekiel Mueller  Date of Procedure : 07/10/2018   :: Genaro Berry PA-C  Diagnosis: NAFLD    Probe: XL    Fibroscan readin.1 KPa    Fibrosis:F1-F2     CAP readin dB/m    Steatosis: :S3

## 2018-07-30 DIAGNOSIS — E11.65 CONTROLLED TYPE 2 DIABETES MELLITUS WITH HYPERGLYCEMIA, WITHOUT LONG-TERM CURRENT USE OF INSULIN: ICD-10-CM

## 2018-07-30 RX ORDER — METFORMIN HYDROCHLORIDE 500 MG/1
500 TABLET ORAL 2 TIMES DAILY WITH MEALS
Qty: 180 TABLET | Refills: 3 | Status: SHIPPED | OUTPATIENT
Start: 2018-07-30 | End: 2018-07-30 | Stop reason: SDUPTHER

## 2018-07-30 NOTE — TELEPHONE ENCOUNTER
----- Message from Radha Roa sent at 7/30/2018  2:12 PM CDT -----  Contact: wife/tavo/[phone]  Pt wife called in regards to getting a Rx refill for metformin (GLUCOPHAGE) 500 MG tablet 180 tablet 3 10/18/2017 10/18/2018 No Take 1 tablet (500 mg total) by mouth 2 (two) times daily with meals    [facility] [phone]  Please advise

## 2018-08-02 ENCOUNTER — OFFICE VISIT (OUTPATIENT)
Dept: INTERNAL MEDICINE | Facility: CLINIC | Age: 73
End: 2018-08-02
Payer: MEDICARE

## 2018-08-02 VITALS
DIASTOLIC BLOOD PRESSURE: 92 MMHG | HEART RATE: 71 BPM | HEIGHT: 73 IN | WEIGHT: 230.19 LBS | BODY MASS INDEX: 30.51 KG/M2 | SYSTOLIC BLOOD PRESSURE: 152 MMHG | OXYGEN SATURATION: 97 %

## 2018-08-02 DIAGNOSIS — E79.0 HYPERURICEMIA: ICD-10-CM

## 2018-08-02 DIAGNOSIS — M10.9 ACUTE GOUT OF LEFT FOOT, UNSPECIFIED CAUSE: Primary | ICD-10-CM

## 2018-08-02 PROCEDURE — 3080F DIAST BP >= 90 MM HG: CPT | Mod: CPTII,S$GLB,, | Performed by: NURSE PRACTITIONER

## 2018-08-02 PROCEDURE — 99999 PR PBB SHADOW E&M-EST. PATIENT-LVL III: CPT | Mod: PBBFAC,,, | Performed by: NURSE PRACTITIONER

## 2018-08-02 PROCEDURE — 99213 OFFICE O/P EST LOW 20 MIN: CPT | Mod: S$GLB,,, | Performed by: NURSE PRACTITIONER

## 2018-08-02 PROCEDURE — 3077F SYST BP >= 140 MM HG: CPT | Mod: CPTII,S$GLB,, | Performed by: NURSE PRACTITIONER

## 2018-08-02 RX ORDER — INDOMETHACIN 50 MG/1
50 CAPSULE ORAL
Qty: 21 CAPSULE | Refills: 0 | Status: SHIPPED | OUTPATIENT
Start: 2018-08-02 | End: 2018-08-09

## 2018-08-02 NOTE — PROGRESS NOTES
INTERNAL MEDICINE URGENT CARE NOTE    CHIEF COMPLAINT     Chief Complaint   Patient presents with    Foot Pain     left foot pain since thursday       HPI     Ezekiel Mueller is a 72 y.o. male with pulmonary htn, htn, hld, atherosclerosis of arota, IFG, and hyperuricemia  who presents for an urgent visit today.  He is an established pt of Dr Harvey.     Here with c/o left foot pain x 1 week. Started suddenly 1 week ago. With redness and swelling to the left forefoot.  NO fever or chills. No injury or trauma. Reports swelling and redness have decreased slightly.     Past Medical History:  Past Medical History:   Diagnosis Date    Basal cell carcinoma 02/2015    R eyelid    Borderline diabetes 2016    [facility]     [facility]    Elevated blood uric acid level 2016    Fatty liver     Hypertension     diagnosed 2005    Pancreatitis 07/2015    etiology unclear       Home Medications:  Prior to Admission medications    Medication Sig Start Date End Date Taking? Authorizing Provider   amLODIPine (NORVASC) 10 MG tablet TAKE 1 TABLET ONE TIME DAILY 12/13/17  Yes Joshua Leija MD   aspirin (ECOTRIN) 81 MG EC tablet Take 81 mg by mouth once daily.   Yes Historical Provider, MD   blood sugar diagnostic Strp 1 strip by Misc.(Non-Drug; Combo Route) route once daily. 2/20/18  Yes Joshua Leija MD   hydroCHLOROthiazide (HYDRODIURIL) 25 MG tablet TAKE 1 TABLET EVERY DAY 4/2/18  Yes Joshua Leija MD   lancets (TRUEPLUS LANCETS) 33 gauge Misc 1 lancet by Misc.(Non-Drug; Combo Route) route 3 (three) times daily as needed. 2/26/18  Yes Joshua Leija MD   lancing device (BD LANCET DEVICE) Misc 1 Units by Misc.(Non-Drug; Combo Route) route once daily. Please dispense lancets and strips - # 100 2/19/18 2/19/19 Yes Joshua Leija MD   losartan (COZAAR) 25 MG tablet Take 1 tablet (25 mg total) by mouth once daily. For blood pressure 6/15/18 6/15/19 Yes Al Havrey MD  "  metFORMIN (GLUCOPHAGE) 500 MG tablet TAKE 1 TABLET TWICE DAILY WITH MEALS 7/30/18  Yes Joshua Leija MD   blood-glucose meter Misc 1 kit by Misc.(Non-Drug; Combo Route) route 3 (three) times daily. 2/20/18 6/15/18  Joshua Leija MD   erythromycin (ROMYCIN) ophthalmic ointment Place into the left eye every evening. 2/19/18   Joshua Leija MD       Review of Systems:  Review of Systems   Constitutional: Negative for chills, fatigue and fever.   Respiratory: Negative for cough, shortness of breath and wheezing.    Gastrointestinal: Negative for abdominal pain, constipation, diarrhea, nausea and vomiting.   Genitourinary: Negative for difficulty urinating, hematuria and urgency.   Musculoskeletal: Negative for myalgias.   Skin: Positive for color change.   Neurological: Negative for dizziness, light-headedness and headaches.       Health Maintainence:   Immunizations:  Health Maintenance       Date Due Completion Date    TETANUS VACCINE 10/27/1964 ---    High Dose Statin 10/27/1967 ---    Colonoscopy 10/27/1996 ---    Zoster Vaccine 10/27/2006 ---    Pneumococcal (65+) (1 of 2 - PCV13) 10/27/2011 ---    Influenza Vaccine 08/01/2018 10/9/2017    Lipid Panel 10/09/2022 10/9/2017           PHYSICAL EXAM     BP (!) 152/92 (BP Location: Right arm, Patient Position: Sitting, BP Method: Large (Manual))   Pulse 71   Ht 6' 1" (1.854 m)   Wt 104.4 kg (230 lb 2.6 oz)   SpO2 97%   BMI 30.37 kg/m²     Physical Exam   Constitutional: He is oriented to person, place, and time. He appears well-developed and well-nourished.   HENT:   Head: Normocephalic and atraumatic.   Eyes: Pupils are equal, round, and reactive to light.   Cardiovascular: Normal rate and regular rhythm.    Pulmonary/Chest: Effort normal.   Musculoskeletal:        Left foot: There is tenderness and swelling. There is normal range of motion, no bony tenderness, normal capillary refill, no crepitus, no deformity and no laceration.        " Feet:    Neurological: He is alert and oriented to person, place, and time.   Psychiatric: He has a normal mood and affect.   Vitals reviewed.      LABS     Lab Results   Component Value Date    HGBA1C 5.9 (H) 06/25/2018     CMP  Sodium   Date Value Ref Range Status   06/26/2018 141 136 - 145 mmol/L Final     Potassium   Date Value Ref Range Status   06/26/2018 4.1 3.5 - 5.1 mmol/L Final     Chloride   Date Value Ref Range Status   06/26/2018 103 95 - 110 mmol/L Final     CO2   Date Value Ref Range Status   06/26/2018 29 23 - 29 mmol/L Final     Glucose   Date Value Ref Range Status   06/26/2018 109 70 - 110 mg/dL Final     BUN, Bld   Date Value Ref Range Status   06/26/2018 19 8 - 23 mg/dL Final     Creatinine   Date Value Ref Range Status   06/26/2018 1.0 0.5 - 1.4 mg/dL Final     Calcium   Date Value Ref Range Status   06/26/2018 10.2 8.7 - 10.5 mg/dL Final     Total Protein   Date Value Ref Range Status   06/26/2018 8.2 6.0 - 8.4 g/dL Final     Albumin   Date Value Ref Range Status   06/26/2018 4.4 3.5 - 5.2 g/dL Final     Total Bilirubin   Date Value Ref Range Status   06/26/2018 0.5 0.1 - 1.0 mg/dL Final     Comment:     For infants and newborns, interpretation of results should be based  on gestational age, weight and in agreement with clinical  observations.  Premature Infant recommended reference ranges:  Up to 24 hours.............<8.0 mg/dL  Up to 48 hours............<12.0 mg/dL  3-5 days..................<15.0 mg/dL  6-29 days.................<15.0 mg/dL       Alkaline Phosphatase   Date Value Ref Range Status   06/26/2018 54 (L) 55 - 135 U/L Final     AST   Date Value Ref Range Status   06/26/2018 39 10 - 40 U/L Final     ALT   Date Value Ref Range Status   06/26/2018 55 (H) 10 - 44 U/L Final     Anion Gap   Date Value Ref Range Status   06/26/2018 9 8 - 16 mmol/L Final     eGFR if    Date Value Ref Range Status   06/26/2018 >60.0 >60 mL/min/1.73 m^2 Final     eGFR if non African  American   Date Value Ref Range Status   06/26/2018 >60.0 >60 mL/min/1.73 m^2 Final     Comment:     Calculation used to obtain the estimated glomerular filtration  rate (eGFR) is the CKD-EPI equation.        Lab Results   Component Value Date    WBC 6.33 06/26/2018    HGB 14.8 06/26/2018    HCT 44.0 06/26/2018    MCV 85 06/26/2018     06/26/2018     Lab Results   Component Value Date    CHOL 197 10/09/2017    CHOL 193 12/12/2016    CHOL 171 02/01/2016     Lab Results   Component Value Date    HDL 50 10/09/2017    HDL 50 12/12/2016    HDL 43 02/01/2016     Lab Results   Component Value Date    LDLCALC 122 (H) 10/09/2017    LDLCALC 120 (H) 12/12/2016    LDLCALC 102.4 02/01/2016     Lab Results   Component Value Date    TRIG 126 10/09/2017    TRIG 114 12/12/2016    TRIG 128 02/01/2016     Lab Results   Component Value Date    CHOLHDL 25.1 02/01/2016    CHOLHDL 32.9 07/22/2015     No results found for: TSH, R1XHOTJ, A3SPOGZ, THYROIDAB    ASSESSMENT/PLAN     Ezekiel Mueller is a 72 y.o. male with  Past Medical History:   Diagnosis Date    Basal cell carcinoma 02/2015    R eyelid    Borderline diabetes 2016    [facility]ps     [facility]    Elevated blood uric acid level 2016    Fatty liver     Hypertension     diagnosed 2005    Pancreatitis 07/2015    etiology unclear     Acute gout of left foot, unspecified cause- will start indomethacin tid with meal. Avoid high purine foods. Elevated foot.   -     indomethacin (INDOCIN) 50 MG capsule; Take 1 capsule (50 mg total) by mouth 3 (three) times daily with meals. for 7 days  Dispense: 21 capsule; Refill: 0    Hyperuricemia  -     indomethacin (INDOCIN) 50 MG capsule; Take 1 capsule (50 mg total) by mouth 3 (three) times daily with meals. for 7 days  Dispense: 21 capsule; Refill: 0      Follow up as needed and with PCP     Patient education provided from [facility]. Patient was counseled on when and how to seek emergent care.        Meredith DELUCA, MONI, FNP-c   Department of Internal Medicine - [facility]  9:22 AM

## 2018-08-03 ENCOUNTER — TELEPHONE (OUTPATIENT)
Dept: INTERNAL MEDICINE | Facility: CLINIC | Age: 73
End: 2018-08-03

## 2018-08-03 NOTE — TELEPHONE ENCOUNTER
This prescription was not order by Dr Harvey. Medication ordered by Andres POOLE. Message sent back to the practice.

## 2018-08-03 NOTE — TELEPHONE ENCOUNTER
"----- Message from Mel Zendejas sent at 8/3/2018 10:10 AM CDT -----  Contact: [id number] [phone]  Prior Authorization Needed    Rx: indomethacin (INDOCIN) 50 MG capsule    To submit the PA:    1: Go to " [email or web address] " and click "Enter a Key"    2. Enter the patient's last name and date of birth and the key.      KEY: [id number]    3. Complete the forms and click "send to Plan"    Note chart when prior authorization has been submitted.    Please notify pharmacy when prior authorization has been approved.    Thank You    "

## 2018-08-07 ENCOUNTER — TELEPHONE (OUTPATIENT)
Dept: INTERNAL MEDICINE | Facility: CLINIC | Age: 73
End: 2018-08-07

## 2018-08-07 NOTE — TELEPHONE ENCOUNTER
"----- Message from Mel Janet Aashish sent at 8/7/2018  8:36 AM CDT -----  2nd Request    Prior Authorization Needed     Rx: indomethacin (INDOCIN) 50 MG capsule     To submit the PA:     1: Go to " [email or web address] " and click "Enter a Key"     2. Enter the patient's last name and date of birth and the key.                            KEY: [id number]     3. Complete the forms and click "send to Plan"     Note chart when prior authorization has been submitted.     Please notify pharmacy when prior authorization has been approved.     Thank You  "

## 2018-08-07 NOTE — TELEPHONE ENCOUNTER
"Prior authorization for Indomethacin is approved.    "This request has received a Favorable outcome.    Please note any additional information provided by [employer] at the bottom of your screen.    You will also receive a faxed copy of the determination."  "

## 2018-09-12 ENCOUNTER — DOCUMENTATION ONLY (OUTPATIENT)
Dept: ADMINISTRATIVE | Facility: HOSPITAL | Age: 73
End: 2018-09-12

## 2018-09-14 ENCOUNTER — OFFICE VISIT (OUTPATIENT)
Dept: INTERNAL MEDICINE | Facility: CLINIC | Age: 73
End: 2018-09-14
Payer: MEDICARE

## 2018-09-14 ENCOUNTER — IMMUNIZATION (OUTPATIENT)
Dept: INTERNAL MEDICINE | Facility: CLINIC | Age: 73
End: 2018-09-14
Payer: MEDICARE

## 2018-09-14 VITALS
HEART RATE: 64 BPM | SYSTOLIC BLOOD PRESSURE: 130 MMHG | HEIGHT: 73 IN | TEMPERATURE: 98 F | OXYGEN SATURATION: 96 % | DIASTOLIC BLOOD PRESSURE: 80 MMHG | WEIGHT: 220 LBS | BODY MASS INDEX: 29.16 KG/M2

## 2018-09-14 DIAGNOSIS — R74.8 ELEVATED LIVER ENZYMES: ICD-10-CM

## 2018-09-14 DIAGNOSIS — Z00.00 ANNUAL PHYSICAL EXAM: Primary | ICD-10-CM

## 2018-09-14 DIAGNOSIS — E79.0 HYPERURICEMIA: ICD-10-CM

## 2018-09-14 DIAGNOSIS — E66.3 OVERWEIGHT (BMI 25.0-29.9): ICD-10-CM

## 2018-09-14 DIAGNOSIS — R73.01 IMPAIRED FASTING BLOOD SUGAR: ICD-10-CM

## 2018-09-14 DIAGNOSIS — E78.5 HYPERLIPIDEMIA LDL GOAL <100: ICD-10-CM

## 2018-09-14 DIAGNOSIS — I10 ESSENTIAL HYPERTENSION: ICD-10-CM

## 2018-09-14 PROCEDURE — 99214 OFFICE O/P EST MOD 30 MIN: CPT | Mod: PBBFAC,25 | Performed by: INTERNAL MEDICINE

## 2018-09-14 PROCEDURE — 3079F DIAST BP 80-89 MM HG: CPT | Mod: CPTII,,, | Performed by: INTERNAL MEDICINE

## 2018-09-14 PROCEDURE — 99999 PR PBB SHADOW E&M-EST. PATIENT-LVL IV: CPT | Mod: PBBFAC,,, | Performed by: INTERNAL MEDICINE

## 2018-09-14 PROCEDURE — 90662 IIV NO PRSV INCREASED AG IM: CPT | Mod: PBBFAC

## 2018-09-14 PROCEDURE — 3075F SYST BP GE 130 - 139MM HG: CPT | Mod: CPTII,,, | Performed by: INTERNAL MEDICINE

## 2018-09-14 PROCEDURE — 99397 PER PM REEVAL EST PAT 65+ YR: CPT | Mod: S$PBB,,, | Performed by: INTERNAL MEDICINE

## 2018-09-14 RX ORDER — LOSARTAN POTASSIUM 25 MG/1
12.5 TABLET ORAL DAILY
Qty: 45 TABLET | Refills: 3
Start: 2018-09-14 | End: 2019-04-11 | Stop reason: SDUPTHER

## 2018-09-14 NOTE — PATIENT INSTRUCTIONS
Please check your blood pressure at home while taking your amlodipine and 1/2 tablet of losartan, and NOT the hydrochlorothiazide.     I recommended check with pharmacy re: insurance coverage for new shingles vaccine (Shingrix) -- if this is covered, I do recommend that you get this vaccine.       Eating to Prevent Gout  Gout is a painful form of arthritis caused by an excess of uric acid. This is a waste product made by the body. It builds up in the body and forms crystals that collect in the joints, bringing on a gout attack. Alcohol and certain foods can trigger a gout attack. Below are some guidelines for changing your diet to help you manage gout. Your healthcare provider can work with you to determine the best eating plan for you. Know that diet is only one part of managing gout. Take your medicines as prescribed and follow the other guidelines your healthcare provider has given you.  Foods to limit  Eating too many foods containing purines may increase the levels of uric acid in your body and increase your risk for a gout attack. It may be best to limit these high-purine foods:  · Alcohol (beer, red wine). You may be told to avoid alcohol completely.  · Certain fish (anchovies, sardines, fish roes, herring, tuna, mussels, codfish, scallops, trout, and lor)  · Certain meats (red meat, processed meat, chavez, turkey, wild game, and goose)  · Sauces and gravies made with meat  · Organ meats (such as liver, kidneys, sweetbreads, and tripe)  · Legumes (such as dried beans, peas)  · Mushrooms, spinach, asparagus, and cauliflower  · Yeast and yeast extract supplements  Foods to try  Some foods may be helpful for people with gout. You may want to try adding some of the following foods to your diet:  · Dark berries: These include blueberries, blackberries, and cherries. These berries contain chemicals that may lower uric acid.  · Tofu: Tofu, which is made from soy, is a good source of protein. Studies have shown  that it may be a better choice than meat for people with gout.  · Omega fatty acids: These acids are found in fatty fish (such as salmon), certain oils (such as flax, olive, or nut oils), or nuts. They may help prevent inflammation due to gout.  The following guidelines are recommended by the American Medical Association for people with gout. Your diet should be:  · High in fiber, whole grains, fruits, and vegetables.  · Low in protein (15% of calories should come from protein. Choose lean sources such as soy, lean meats, and poultry).  · Low in fat (no more than 30% of calories should come from fat, with only 10% coming from animal fat).   Date Last Reviewed: 6/17/2015 © 2000-2017 [employer]. [address] [place] [address], [place] [address]. All rights reserved. This information is not intended as a substitute for professional medical care. Always follow your healthcare professional's instructions.

## 2018-09-14 NOTE — PROGRESS NOTES
Subjective:       Patient ID: Ezeikel Mueller is a 72 y.o. male.    Chief Complaint: Follow-up    HPI  73 y/o man with HTN, HLD, borderline diabetes, h/o basal cell carcinoma of eyelid here for follow up / annual exam.   Seen 6/2018 for establish care visit.  [facility]    Overall feeling well.   Has been working on weight loss - making healthy diet changes, watching portions.   Exercising more - cutting trees with axe rather than chainsaw, walking more.     Hyperuricemia, ?Gout - had h/o hyperuricemia in the past, seen 8/2/18 for acute foot pain and diagnosed as acute gout in L foot. Treated with indomethacin.  Today says doesn't think this was gout, thinks it was insect bite - had another insect bite since then where he saw the insect / spider, had similar swelling and pain that resolved over the same period of time.     HTN - diagnosed just after [place] (~2005). On norvasc 10mg and HCTZ 25mg; losartan 25mg added at last visit.   Not currently taking losartan -- tried this for 3 days, had nausea, felt tired, episode of near-syncope. Didn't check his BP around that time. Reports that lisinopril also had a strong BP-lowering effect and some nausea initially with this.   BP this /72 at home, reports usually 120s/70s in the morning, in the evening low 130s/70-80.    Dyslipidemia - LDL goal <100 ideally. Not currently on statin, doesn't want to start right now but willing to consider if diet/weight loss doesn't improve LDL enough    Fatty liver - saw hepatology since last visit, labs done which were overall unremarkable other than mild elevated LFT. Fibroscan showed stage 1-2 fibrosis. Planned for follow up at 1 year.    H/o borderline diabetes - A1c rechecked 6/25/18, down to 5.9 from 6.3 previously.   On metformin 500mg BID -- now taking 1000mg once daily.   No side effects from this  Avoiding sweets, working on eating healthier diet.     H/o BCC R eyelid,  "excision done 2/2015, saw dermatology for skin exam 6/2018, had inflamed SK removed. Plan for follow up again 6/2019    H/o pancreatitis 7/2015 - noted as possible gallstone pancreatitis on that admission, but patient had cholecystectomy in 2012; EUS performed during hospitalization did not show any stones in the biliary tree, so no ERCP was performed.    Goal for weight loss is to get under 200#.    [facility]; pt thought [facility] was around 5 years ago.  No trouble with urination  Sleeping well at night    Review of Systems   Constitutional: Negative for activity change and unexpected weight change.   HENT: Negative.    Eyes: Negative for visual disturbance.   Respiratory: Negative for cough and shortness of breath.    Cardiovascular: Negative for chest pain, palpitations and leg swelling.   Gastrointestinal: Negative for abdominal pain, blood in stool, constipation, diarrhea and vomiting.   Endocrine: Negative.    Genitourinary: Negative for difficulty urinating, hematuria and urgency.   Musculoskeletal: Negative for arthralgias and joint swelling.   Skin: Negative for rash.   Neurological: Negative for weakness and headaches.   Psychiatric/Behavioral: Negative for dysphoric mood and sleep disturbance. The patient is not nervous/anxious.          Past medical history, surgical history, and family medical history reviewed and updated as appropriate.    Medications and allergies reviewed.     Objective:          Vitals:    09/14/18 0914   BP: 130/80   BP Location: Right arm   Patient Position: Sitting   Pulse: 64   Temp: 97.9 °F (36.6 °C)   TempSrc: Oral   SpO2: 96%   Weight: 99.8 kg (220 lb 0.3 oz)   Height: 6' 1" (1.854 m)     Body mass index is 29.03 kg/m².  Physical Exam   Constitutional: He is oriented to person, place, and time. He appears well-developed and well-nourished. No distress.   HENT:   Head: Normocephalic and atraumatic.   Mouth/Throat: Oropharynx is clear and " moist.   Eyes: Conjunctivae and EOM are normal. No scleral icterus.   Neck: Neck supple.   Cardiovascular: Normal rate, regular rhythm, normal heart sounds and intact distal pulses.   No murmur heard.  Pulmonary/Chest: Effort normal and breath sounds normal. No respiratory distress.   Abdominal: Soft. Bowel sounds are normal. He exhibits no distension. There is no tenderness.   Musculoskeletal: He exhibits no edema or tenderness.   Lymphadenopathy:     He has no cervical adenopathy.   Neurological: He is alert and oriented to person, place, and time. No cranial nerve deficit.   Skin: Skin is warm and dry. No erythema.   Psychiatric: He has a normal mood and affect.   Vitals reviewed.      Lab Results   Component Value Date    WBC 6.33 06/26/2018    HGB 14.8 06/26/2018    HCT 44.0 06/26/2018     06/26/2018    CHOL 197 10/09/2017    TRIG 126 10/09/2017    HDL 50 10/09/2017    ALT 55 (H) 06/26/2018    AST 39 06/26/2018     06/26/2018    K 4.1 06/26/2018     06/26/2018    CREATININE 1.0 06/26/2018    BUN 19 06/26/2018    CO2 29 06/26/2018    PSA 0.51 06/25/2018    INR 1.0 06/26/2018    HGBA1C 5.9 (H) 06/25/2018       Assessment:       1. Annual physical exam    2. Essential hypertension    3. Hyperlipidemia LDL goal <100    4. Impaired fasting blood sugar    5. Elevated liver enzymes    6. Hyperuricemia    7. Overweight (BMI 25.0-29.9)        Plan:   Ezekiel was seen today for follow-up.    Diagnoses and all orders for this visit:    Annual physical exam - Overall healthy, doing well. Reviewed chronic and preventive health concerns.    Essential hypertension - given elevated uric acid level and possible gout, recommended take the norvasc and 1/2 tablet losartan, and hold HCTZ for now. If needed can then uptitrate the losartan as this may help reduce risk of gout flare, while HCTZ can increase it.   Recommended monitor BP at home, contact clinic if running low or high  -     losartan (COZAAR) 25 MG  tablet; Take 0.5 tablets (12.5 mg total) by mouth once daily.    Hyperlipidemia LDL goal <100 - continue to work on weight loss, healthy diet/lifestyle changes  Not currently on statin; will recheck and evaluate ASCVD risk / statin recommendation again at that time  -     Lipid panel; Future    Impaired fasting blood sugar - last A1c improved, continue working on diet/exercise/weight loss. Will check this again in 12 months, earlier if struggling more with sustaining weight loss and diet change    Elevated liver enzymes - improved somewhat on last labs; will monitor. F/u with hepatology at 1 year.  -     Comprehensive metabolic panel; Future    Hyperuricemia - reviewed role of allopurinol; he would like to work on diet change first rather than starting on urate-lowering medication   -     Uric acid; Future    Overweight (BMI 25.0-29.9) - congratulated on healthy changes made so far!    He will continue working on healthy diet, walking more frequently, continuing to lose weight. Diet instructions to help reduce uric acid given.  He will send in home BP readings in about 2 weeks and follow up for visit with NP/PA in 2 months to review labs.     Health maintenance reviewed with patient.   Flu vaccine today  Recommended check on getting Shingrix vaccine - should get this if possible    [facility]; pt thought last was around 5 years ago.  Pt thinks he has had one or both pneumonia vaccines [facility] previously    Labs including lipid panel in 2 months    Follow-up in about 6 months (around 3/14/2019) for follow up.    Al Harvey MD  Internal Medicine  [facility]  9/14/2018

## 2018-09-14 NOTE — Clinical Note
Kandi - any word back from [facility] re: [facility] for this patent in the past? Also - he said he thought he had his pneumonia vaccines done there as well (and [facility] is [facility] that). Can you check in about that documentation?Thanks!Al Harvey MD

## 2018-09-28 DIAGNOSIS — I10 ESSENTIAL HYPERTENSION: ICD-10-CM

## 2018-09-28 RX ORDER — AMLODIPINE BESYLATE 10 MG/1
TABLET ORAL
Qty: 90 TABLET | Refills: 3 | Status: SHIPPED | OUTPATIENT
Start: 2018-09-28 | End: 2019-04-11 | Stop reason: SDUPTHER

## 2018-11-15 ENCOUNTER — TELEPHONE (OUTPATIENT)
Dept: INTERNAL MEDICINE | Facility: CLINIC | Age: 73
End: 2018-11-15

## 2018-11-15 NOTE — TELEPHONE ENCOUNTER
----- Message from Alejandrina Crowder sent at 11/15/2018 11:40 AM CST -----  Contact: [facility][phone]/ph   Eros called in regards needing to f/u on fax send 10/31/18 (unable to find on file, he will fax it again) Recommendation for erma therapy to lower cardio vascular risk. Please call and adivse. Please be aware of it. Thank you

## 2019-03-08 ENCOUNTER — TELEPHONE (OUTPATIENT)
Dept: HEPATOLOGY | Facility: CLINIC | Age: 74
End: 2019-03-08

## 2019-03-08 NOTE — TELEPHONE ENCOUNTER
----- Message from Martine Chambers sent at 3/8/2019  8:25 AM CST -----  Contact: Marcelle pt's wife      ----- Message -----  From: Bam Hanson  Sent: 3/7/2019  10:28 AM  To: Hepatology Scheduling    Needs Advice    Reason for call: Received a letter saying it's time to schedule a f/u appt for the pt    Best days for pt to schedule appt on is a Monday or Friday        Communication Preference: [phone]    Additional Information: N/A

## 2019-03-18 ENCOUNTER — OFFICE VISIT (OUTPATIENT)
Dept: OPTOMETRY | Facility: CLINIC | Age: 74
End: 2019-03-18
Payer: MEDICARE

## 2019-03-18 DIAGNOSIS — H17.9 CORNEAL SCAR AND OPACITY: ICD-10-CM

## 2019-03-18 DIAGNOSIS — H25.13 NUCLEAR SCLEROSIS, BILATERAL: Primary | ICD-10-CM

## 2019-03-18 DIAGNOSIS — H52.4 HYPEROPIA OF BOTH EYES WITH REGULAR ASTIGMATISM AND PRESBYOPIA: ICD-10-CM

## 2019-03-18 DIAGNOSIS — H52.223 HYPEROPIA OF BOTH EYES WITH REGULAR ASTIGMATISM AND PRESBYOPIA: ICD-10-CM

## 2019-03-18 DIAGNOSIS — H52.03 HYPEROPIA OF BOTH EYES WITH REGULAR ASTIGMATISM AND PRESBYOPIA: ICD-10-CM

## 2019-03-18 PROCEDURE — 92015 PR REFRACTION: ICD-10-PCS | Mod: S$GLB,,, | Performed by: OPTOMETRIST

## 2019-03-18 PROCEDURE — 92014 COMPRE OPH EXAM EST PT 1/>: CPT | Mod: S$GLB,,, | Performed by: OPTOMETRIST

## 2019-03-18 PROCEDURE — 99999 PR PBB SHADOW E&M-EST. PATIENT-LVL II: ICD-10-PCS | Mod: PBBFAC,,, | Performed by: OPTOMETRIST

## 2019-03-18 PROCEDURE — 92015 DETERMINE REFRACTIVE STATE: CPT | Mod: S$GLB,,, | Performed by: OPTOMETRIST

## 2019-03-18 PROCEDURE — 92014 PR EYE EXAM, EST PATIENT,COMPREHESV: ICD-10-PCS | Mod: S$GLB,,, | Performed by: OPTOMETRIST

## 2019-03-18 PROCEDURE — 99999 PR PBB SHADOW E&M-EST. PATIENT-LVL II: CPT | Mod: PBBFAC,,, | Performed by: OPTOMETRIST

## 2019-03-18 NOTE — PROGRESS NOTES
HPI     Pt is in for updated glasses prescription . Pt states that he is having a   hard time seeing with his current prescription OU.   OS has frequent pain and dryness.  Using Systane with relief. Blessing QHS  Hx of 3 gustafson accident with endophthalmitis OS. (1987)   Denies flashes/floaters OU.     Pt denies Diabetes, but is taking Metformin to prevent DM    Last edited by Mendez Monroe, OD on 3/18/2019  3:24 PM. (History)            Assessment /Plan     For exam results, see Encounter Report.    Nuclear sclerosis, bilateral  -Educated patient on presence of cataracts at today's exam, monitor at annual dilated fundus exam. 5-10 years surgical estimate.    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

## 2019-03-21 ENCOUNTER — OFFICE VISIT (OUTPATIENT)
Dept: INTERNAL MEDICINE | Facility: CLINIC | Age: 74
End: 2019-03-21
Payer: MEDICARE

## 2019-03-21 DIAGNOSIS — I10 ESSENTIAL HYPERTENSION: Primary | ICD-10-CM

## 2019-03-21 DIAGNOSIS — R74.8 ELEVATED LIVER ENZYMES: ICD-10-CM

## 2019-03-21 DIAGNOSIS — I27.20 PULMONARY HYPERTENSION: ICD-10-CM

## 2019-03-21 DIAGNOSIS — R21 RASH: ICD-10-CM

## 2019-03-21 DIAGNOSIS — Z85.828 HISTORY OF BASAL CELL CARCINOMA: ICD-10-CM

## 2019-03-21 DIAGNOSIS — E78.5 HYPERLIPIDEMIA LDL GOAL <100: ICD-10-CM

## 2019-03-21 DIAGNOSIS — R73.03 BORDERLINE DIABETES MELLITUS: ICD-10-CM

## 2019-03-21 DIAGNOSIS — I70.0 ATHEROSCLEROSIS OF AORTA: ICD-10-CM

## 2019-03-21 DIAGNOSIS — R73.01 ELEVATED FASTING GLUCOSE: ICD-10-CM

## 2019-03-21 DIAGNOSIS — K76.0 FATTY LIVER: ICD-10-CM

## 2019-03-21 PROCEDURE — 3075F SYST BP GE 130 - 139MM HG: CPT | Mod: CPTII,S$GLB,, | Performed by: INTERNAL MEDICINE

## 2019-03-21 PROCEDURE — 99999 PR PBB SHADOW E&M-EST. PATIENT-LVL III: CPT | Mod: PBBFAC,,, | Performed by: INTERNAL MEDICINE

## 2019-03-21 PROCEDURE — 99999 PR PBB SHADOW E&M-EST. PATIENT-LVL III: ICD-10-PCS | Mod: PBBFAC,,, | Performed by: INTERNAL MEDICINE

## 2019-03-21 PROCEDURE — 99214 PR OFFICE/OUTPT VISIT, EST, LEVL IV, 30-39 MIN: ICD-10-PCS | Mod: S$GLB,,, | Performed by: INTERNAL MEDICINE

## 2019-03-21 PROCEDURE — 1101F PT FALLS ASSESS-DOCD LE1/YR: CPT | Mod: CPTII,S$GLB,, | Performed by: INTERNAL MEDICINE

## 2019-03-21 PROCEDURE — 1101F PR PT FALLS ASSESS DOC 0-1 FALLS W/OUT INJ PAST YR: ICD-10-PCS | Mod: CPTII,S$GLB,, | Performed by: INTERNAL MEDICINE

## 2019-03-21 PROCEDURE — 99499 RISK ADDL DX/OHS AUDIT: ICD-10-PCS | Mod: HCWC,S$GLB,, | Performed by: INTERNAL MEDICINE

## 2019-03-21 PROCEDURE — 99499 UNLISTED E&M SERVICE: CPT | Mod: HCWC,S$GLB,, | Performed by: INTERNAL MEDICINE

## 2019-03-21 PROCEDURE — 3075F PR MOST RECENT SYSTOLIC BLOOD PRESS GE 130-139MM HG: ICD-10-PCS | Mod: CPTII,S$GLB,, | Performed by: INTERNAL MEDICINE

## 2019-03-21 PROCEDURE — 99214 OFFICE O/P EST MOD 30 MIN: CPT | Mod: S$GLB,,, | Performed by: INTERNAL MEDICINE

## 2019-03-21 PROCEDURE — 3078F PR MOST RECENT DIASTOLIC BLOOD PRESSURE < 80 MM HG: ICD-10-PCS | Mod: CPTII,S$GLB,, | Performed by: INTERNAL MEDICINE

## 2019-03-21 PROCEDURE — 3078F DIAST BP <80 MM HG: CPT | Mod: CPTII,S$GLB,, | Performed by: INTERNAL MEDICINE

## 2019-03-21 NOTE — PROGRESS NOTES
Subjective:       Patient ID: Ezekiel Mueller is a 73 y.o. male.    Chief Complaint: Follow-up    HPI  72 y/o man with HTN, HLD, borderline diabetes, h/o basal cell carcinoma of eyelid here for follow up. Hasn't yet had labs done that were ordered    Goal is to lose weight down to 210#.    HTN - diagnosed just after [place] (~2005). On norvasc 10mg and HCTZ 25mg; losartan 25mg added but this caused orthostatic hypotension / pre-syncope.  Tried 1/2 tablet of losartan, but this caused diarrhea, nausea, dizziness; did not continue.  Checking at home, running usually 120-130/70s.  Not sedentary but not exercising; planning to go back to the gym  Does a lot of walking/hiking during hunting season  Tries to watch salt in diet    Dyslipidemia - LDL goal <100 ideally.   Avoiding fried foods    Fatty liver - saw hepatology, labs done which were overall unremarkable other than mild elevated LFT. Fibroscan showed stage 1-2 fibrosis. Planned for follow up at 1 year in June.    H/o borderline diabetes - A1c rechecked 6/25/18, down to 5.9 from 6.3 previously.   On metformin 500mg BID, taking this regularly after meals.  Tries to watch carbs, avoids sugar generally    H/o BCC R eyelid, excision done 2/2015, saw dermatology for skin exam 6/2018, had inflamed SK removed. Plan for follow up again 6/2019    Small area of rash on R leg - has used windex and lysol, anti-itch cream, antibiotic ointment  Improved but still one area remaining    Planning on labs Monday morning early    Review of Systems   Constitutional: Negative for activity change and unexpected weight change.   HENT: Negative.    Eyes: Negative for visual disturbance.   Respiratory: Negative for cough and shortness of breath.    Cardiovascular: Negative for chest pain, palpitations and leg swelling.   Gastrointestinal: Negative for abdominal pain, blood in stool, constipation and diarrhea.   Endocrine: Negative.    Genitourinary: Negative for difficulty urinating and  "urgency.   Musculoskeletal: Negative for arthralgias and joint swelling.   Skin: Positive for rash.   Neurological: Negative for weakness.   Psychiatric/Behavioral: Negative for dysphoric mood and sleep disturbance. The patient is not nervous/anxious.          Past medical history, surgical history, and family medical history reviewed and updated as appropriate.    Medications and allergies reviewed.     Objective:          Vitals:    03/21/19 1616 03/21/19 1642   BP: (!) 140/84 134/78   BP Location: Left arm    Patient Position: Sitting    Pulse: 72    SpO2: 96%    Weight: 103.9 kg (229 lb 0.9 oz)    Height: 6' 1" (1.854 m)      Body mass index is 30.22 kg/m².  Physical Exam   Constitutional: He is oriented to person, place, and time. He appears well-developed and well-nourished. No distress.   HENT:   Head: Normocephalic and atraumatic.   Mouth/Throat: Oropharynx is clear and moist.   Eyes: Conjunctivae and EOM are normal. No scleral icterus.   Neck: Neck supple.   Cardiovascular: Normal rate, regular rhythm and normal heart sounds.   No murmur heard.  Pulmonary/Chest: Effort normal and breath sounds normal. No respiratory distress.   Abdominal: Soft. Bowel sounds are normal. He exhibits no distension. There is no tenderness.   Musculoskeletal: He exhibits no edema or tenderness.   Lymphadenopathy:     He has no cervical adenopathy.   Neurological: He is alert and oriented to person, place, and time. No cranial nerve deficit.   Skin: Skin is warm and dry.   R lower leg, areas of mild erythema with irregular border, macular. No scaling. Some central clearing. No pustules or induration   Psychiatric: He has a normal mood and affect.   Vitals reviewed.      Lab Results   Component Value Date    WBC 6.33 06/26/2018    HGB 14.8 06/26/2018    HCT 44.0 06/26/2018     06/26/2018    CHOL 197 10/09/2017    TRIG 126 10/09/2017    HDL 50 10/09/2017    ALT 55 (H) 06/26/2018    AST 39 06/26/2018     06/26/2018    K " 4.1 06/26/2018     06/26/2018    CREATININE 1.0 06/26/2018    BUN 19 06/26/2018    CO2 29 06/26/2018    PSA 0.51 06/25/2018    INR 1.0 06/26/2018    HGBA1C 5.9 (H) 06/25/2018       Assessment:       1. Essential hypertension    2. Borderline diabetes mellitus    3. Elevated fasting glucose    4. Atherosclerosis of aorta    5. Hyperlipidemia LDL goal <100    6. Fatty liver    7. Elevated liver enzymes    8. Pulmonary hypertension    9. History of basal cell carcinoma    10. Rash        Plan:   Ezekiel was seen today for follow-up.    Diagnoses and all orders for this visit:    Essential hypertension - high on intake; close to goal on repeat    Borderline diabetes mellitus  -     Hemoglobin A1c; Future  Elevated fasting glucose  -     Hemoglobin A1c; Future  Continue metformin, continue working on healthy low-carb diet, regular aerobic exercise    Atherosclerosis of aorta - on ASA with no adverse effects    Hyperlipidemia LDL goal <100    Fatty liver  Elevated liver enzymes - mild but persistent elevation. Repeat today. Continue working on weight loss. F/u with hepatology 6/2019    History of basal cell carcinoma - f/u with dermatology for yearly skin check    Rash - recommended trial of topical antifungal x 2-4 weeks    Pulmonary hypertension - On chart review after visit, noted listed h/o pulmonary HTN - elevated estimated PAP on echo 2015 and 2017, though stable between the two. Moderate aortic sclerosis noted, normal mobility.   Asymptomatic.    Health maintenance reviewed with patient.   [facility] (prevnar [facility] pneumovax)  Recommended check with pharmacy re: insurance coverage for new shingles vaccine (Shingrix); should get this if possible  Recommended check with pharmacy re: cost of tdap    Follow up in about 6 months (around 9/21/2019) for annual physical.    Al Harvey MD  Internal Medicine  [facility]  3/21/2019

## 2019-03-21 NOTE — PATIENT INSTRUCTIONS
For the small area of rash on your leg, try using terbinafine (lamisil) or clotrimazole (lotrimin) twice a day for 2-4 weeks.     Call in to the dermatology clinic to schedule your yearly exam for sometime in June 2019.     [facility] to find out [facility] whether you had the two pneumonia vaccines (prevnar-13 and pneumovax-23) and if so what dates.     Look into the new shingles vaccine - Shingrix     Check in at your pharmacy about getting a tetanus vaccine, specifically the TDaP

## 2019-03-22 ENCOUNTER — TELEPHONE (OUTPATIENT)
Dept: INTERNAL MEDICINE | Facility: CLINIC | Age: 74
End: 2019-03-22

## 2019-03-22 DIAGNOSIS — R73.01 ELEVATED FASTING GLUCOSE: Primary | ICD-10-CM

## 2019-03-22 NOTE — TELEPHONE ENCOUNTER
Good morning, I called the patient no answer LVM for a callback to schedule fasting labs    Thank you

## 2019-03-22 NOTE — TELEPHONE ENCOUNTER
----- Message from Al Harvey MD sent at 3/21/2019  5:54 PM CDT -----  Call patient to schedule fasting labs for Monday AM

## 2019-03-25 ENCOUNTER — PES CALL (OUTPATIENT)
Dept: ADMINISTRATIVE | Facility: CLINIC | Age: 74
End: 2019-03-25

## 2019-03-25 ENCOUNTER — LAB VISIT (OUTPATIENT)
Dept: LAB | Facility: HOSPITAL | Age: 74
End: 2019-03-25
Attending: INTERNAL MEDICINE
Payer: MEDICARE

## 2019-03-25 DIAGNOSIS — R74.8 ELEVATED LIVER ENZYMES: ICD-10-CM

## 2019-03-25 DIAGNOSIS — I10 ESSENTIAL HYPERTENSION: ICD-10-CM

## 2019-03-25 DIAGNOSIS — E78.5 HYPERLIPIDEMIA LDL GOAL <100: ICD-10-CM

## 2019-03-25 DIAGNOSIS — E79.0 HYPERURICEMIA: ICD-10-CM

## 2019-03-25 LAB
ALBUMIN SERPL BCP-MCNC: 4.7 G/DL (ref 3.5–5.2)
ALP SERPL-CCNC: 47 U/L (ref 55–135)
ALT SERPL W/O P-5'-P-CCNC: 46 U/L (ref 10–44)
ANION GAP SERPL CALC-SCNC: 12 MMOL/L (ref 8–16)
ANION GAP SERPL CALC-SCNC: 12 MMOL/L (ref 8–16)
AST SERPL-CCNC: 29 U/L (ref 10–40)
BILIRUB SERPL-MCNC: 0.7 MG/DL (ref 0.1–1)
BUN SERPL-MCNC: 22 MG/DL (ref 8–23)
BUN SERPL-MCNC: 22 MG/DL (ref 8–23)
CALCIUM SERPL-MCNC: 10.5 MG/DL (ref 8.7–10.5)
CALCIUM SERPL-MCNC: 10.5 MG/DL (ref 8.7–10.5)
CHLORIDE SERPL-SCNC: 100 MMOL/L (ref 95–110)
CHLORIDE SERPL-SCNC: 100 MMOL/L (ref 95–110)
CHOLEST SERPL-MCNC: 198 MG/DL (ref 120–199)
CHOLEST/HDLC SERPL: 4.2 {RATIO} (ref 2–5)
CO2 SERPL-SCNC: 29 MMOL/L (ref 23–29)
CO2 SERPL-SCNC: 29 MMOL/L (ref 23–29)
CREAT SERPL-MCNC: 1.2 MG/DL (ref 0.5–1.4)
CREAT SERPL-MCNC: 1.2 MG/DL (ref 0.5–1.4)
EST. GFR  (AFRICAN AMERICAN): >60 ML/MIN/1.73 M^2
EST. GFR  (AFRICAN AMERICAN): >60 ML/MIN/1.73 M^2
EST. GFR  (NON AFRICAN AMERICAN): >60 ML/MIN/1.73 M^2
EST. GFR  (NON AFRICAN AMERICAN): >60 ML/MIN/1.73 M^2
GLUCOSE SERPL-MCNC: 121 MG/DL (ref 70–110)
GLUCOSE SERPL-MCNC: 121 MG/DL (ref 70–110)
HDLC SERPL-MCNC: 47 MG/DL (ref 40–75)
HDLC SERPL: 23.7 % (ref 20–50)
LDLC SERPL CALC-MCNC: 122.6 MG/DL (ref 63–159)
NONHDLC SERPL-MCNC: 151 MG/DL
POTASSIUM SERPL-SCNC: 4.2 MMOL/L (ref 3.5–5.1)
POTASSIUM SERPL-SCNC: 4.2 MMOL/L (ref 3.5–5.1)
PROT SERPL-MCNC: 8.2 G/DL (ref 6–8.4)
SODIUM SERPL-SCNC: 141 MMOL/L (ref 136–145)
SODIUM SERPL-SCNC: 141 MMOL/L (ref 136–145)
TRIGL SERPL-MCNC: 142 MG/DL (ref 30–150)
URATE SERPL-MCNC: 8.3 MG/DL (ref 3.4–7)

## 2019-03-25 PROCEDURE — 84550 ASSAY OF BLOOD/URIC ACID: CPT

## 2019-03-25 PROCEDURE — 36415 COLL VENOUS BLD VENIPUNCTURE: CPT

## 2019-03-25 PROCEDURE — 80053 COMPREHEN METABOLIC PANEL: CPT

## 2019-03-25 PROCEDURE — 80061 LIPID PANEL: CPT

## 2019-03-26 NOTE — TELEPHONE ENCOUNTER
Labs done yesterday  Noted elevated fasting glucose - please call lab to add on A1c test if possible (this has been ordered)

## 2019-03-26 NOTE — TELEPHONE ENCOUNTER
Called lab and they said that they did not draw a lavender top on her today so they are unable to add the A1C order.

## 2019-03-31 VITALS
DIASTOLIC BLOOD PRESSURE: 78 MMHG | BODY MASS INDEX: 30.36 KG/M2 | OXYGEN SATURATION: 96 % | SYSTOLIC BLOOD PRESSURE: 134 MMHG | HEIGHT: 73 IN | WEIGHT: 229.06 LBS | HEART RATE: 72 BPM

## 2019-03-31 PROBLEM — K76.0 FATTY LIVER: Status: ACTIVE | Noted: 2019-03-31

## 2019-04-01 ENCOUNTER — PATIENT MESSAGE (OUTPATIENT)
Dept: INTERNAL MEDICINE | Facility: CLINIC | Age: 74
End: 2019-04-01

## 2019-04-01 DIAGNOSIS — I10 HTN, GOAL BELOW 140/90: ICD-10-CM

## 2019-04-01 RX ORDER — HYDROCHLOROTHIAZIDE 25 MG/1
25 TABLET ORAL DAILY
Qty: 90 TABLET | Refills: 0 | Status: SHIPPED | OUTPATIENT
Start: 2019-04-01 | End: 2019-04-11 | Stop reason: SDUPTHER

## 2019-04-04 NOTE — TELEPHONE ENCOUNTER
Patient coming for HRA/medicare wellness visit 5/13 - please have him do BMP and A1c fasting that day

## 2019-04-05 ENCOUNTER — TELEPHONE (OUTPATIENT)
Dept: INTERNAL MEDICINE | Facility: CLINIC | Age: 74
End: 2019-04-05

## 2019-04-05 DIAGNOSIS — E78.5 HYPERLIPIDEMIA LDL GOAL <100: Primary | ICD-10-CM

## 2019-04-05 RX ORDER — ATORVASTATIN CALCIUM 20 MG/1
20 TABLET, FILM COATED ORAL DAILY
Qty: 90 TABLET | Refills: 3 | Status: SHIPPED | OUTPATIENT
Start: 2019-04-05 | End: 2019-04-11 | Stop reason: SDUPTHER

## 2019-04-11 ENCOUNTER — PATIENT MESSAGE (OUTPATIENT)
Dept: INTERNAL MEDICINE | Facility: CLINIC | Age: 74
End: 2019-04-11

## 2019-04-11 DIAGNOSIS — E78.5 HYPERLIPIDEMIA LDL GOAL <100: ICD-10-CM

## 2019-04-11 DIAGNOSIS — I10 HTN, GOAL BELOW 140/90: ICD-10-CM

## 2019-04-11 DIAGNOSIS — I10 ESSENTIAL HYPERTENSION: ICD-10-CM

## 2019-04-12 ENCOUNTER — PATIENT MESSAGE (OUTPATIENT)
Dept: INTERNAL MEDICINE | Facility: CLINIC | Age: 74
End: 2019-04-12

## 2019-04-12 RX ORDER — HYDROCHLOROTHIAZIDE 25 MG/1
25 TABLET ORAL DAILY
Qty: 90 TABLET | Refills: 1 | Status: SHIPPED | OUTPATIENT
Start: 2019-04-12 | End: 2019-07-26 | Stop reason: SDUPTHER

## 2019-04-12 RX ORDER — LOSARTAN POTASSIUM 25 MG/1
12.5 TABLET ORAL DAILY
Qty: 45 TABLET | Refills: 0
Start: 2019-04-12 | End: 2019-04-12

## 2019-04-12 RX ORDER — AMLODIPINE BESYLATE 10 MG/1
TABLET ORAL
Qty: 90 TABLET | Refills: 0 | Status: SHIPPED | OUTPATIENT
Start: 2019-04-12 | End: 2019-07-26 | Stop reason: SDUPTHER

## 2019-04-12 RX ORDER — LOSARTAN POTASSIUM 25 MG/1
12.5 TABLET ORAL DAILY
Qty: 45 TABLET | Refills: 0 | Status: SHIPPED | OUTPATIENT
Start: 2019-04-12 | End: 2019-11-14 | Stop reason: SDUPTHER

## 2019-04-12 RX ORDER — ATORVASTATIN CALCIUM 20 MG/1
20 TABLET, FILM COATED ORAL DAILY
Qty: 90 TABLET | Refills: 3 | Status: SHIPPED | OUTPATIENT
Start: 2019-04-12 | End: 2020-06-24 | Stop reason: SDUPTHER

## 2019-05-05 ENCOUNTER — PATIENT MESSAGE (OUTPATIENT)
Dept: INTERNAL MEDICINE | Facility: CLINIC | Age: 74
End: 2019-05-05

## 2019-05-05 DIAGNOSIS — I10 HTN, GOAL BELOW 140/90: ICD-10-CM

## 2019-05-05 DIAGNOSIS — E78.5 HYPERLIPIDEMIA LDL GOAL <100: ICD-10-CM

## 2019-05-06 RX ORDER — HYDROCHLOROTHIAZIDE 25 MG/1
25 TABLET ORAL DAILY
Qty: 90 TABLET | Refills: 1 | Status: CANCELLED | OUTPATIENT
Start: 2019-05-06

## 2019-05-06 RX ORDER — ATORVASTATIN CALCIUM 20 MG/1
20 TABLET, FILM COATED ORAL DAILY
Qty: 90 TABLET | Refills: 3 | Status: CANCELLED | OUTPATIENT
Start: 2019-05-06 | End: 2020-05-05

## 2019-05-07 ENCOUNTER — TELEPHONE (OUTPATIENT)
Dept: INTERNAL MEDICINE | Facility: CLINIC | Age: 74
End: 2019-05-07

## 2019-05-07 NOTE — TELEPHONE ENCOUNTER
Sent this information to patient with instruction to call [facility] mail delivery pharmacy himself if he did not actually receive the medication

## 2019-05-07 NOTE — TELEPHONE ENCOUNTER
Called and spoke to Sammie and she stated that hydroCHLOROthiazide (HYDRODIURIL) 25 MG tablet and atorvastatin (LIPITOR) 20 MG tablet was shipped 4-16-19 and was delivered to the pt on 4-19-19

## 2019-05-07 NOTE — TELEPHONE ENCOUNTER
Spoke with pt's preferred pharmacy, pt's prescriptions were received and refilled 04/12 and delivered to pt 04/19. Lipitor was placed on hold because it was sent to [facility], medication (lipitor) was released and pt should receive Rx in 5-7 days.

## 2019-05-07 NOTE — TELEPHONE ENCOUNTER
Patient's rx for HCTZ and atorvastatin were both sent to [facility] mail delivery pharmacy 4/12/19  Please call [facility] pharmacy to see if there is a problem

## 2019-05-13 ENCOUNTER — OFFICE VISIT (OUTPATIENT)
Dept: INTERNAL MEDICINE | Facility: CLINIC | Age: 74
End: 2019-05-13
Payer: MEDICARE

## 2019-05-13 ENCOUNTER — LAB VISIT (OUTPATIENT)
Dept: LAB | Facility: HOSPITAL | Age: 74
End: 2019-05-13
Attending: INTERNAL MEDICINE
Payer: MEDICARE

## 2019-05-13 VITALS
HEART RATE: 69 BPM | HEIGHT: 73 IN | DIASTOLIC BLOOD PRESSURE: 82 MMHG | SYSTOLIC BLOOD PRESSURE: 132 MMHG | WEIGHT: 227.94 LBS | OXYGEN SATURATION: 96 % | BODY MASS INDEX: 30.21 KG/M2

## 2019-05-13 DIAGNOSIS — E79.0 ELEVATED URIC ACID IN BLOOD: Primary | ICD-10-CM

## 2019-05-13 DIAGNOSIS — I27.20 PULMONARY HYPERTENSION: ICD-10-CM

## 2019-05-13 DIAGNOSIS — Z00.00 ENCOUNTER FOR PREVENTIVE HEALTH EXAMINATION: Primary | ICD-10-CM

## 2019-05-13 DIAGNOSIS — S81.801A WOUND OF RIGHT LOWER EXTREMITY, INITIAL ENCOUNTER: ICD-10-CM

## 2019-05-13 DIAGNOSIS — I10 ESSENTIAL HYPERTENSION: ICD-10-CM

## 2019-05-13 DIAGNOSIS — R73.03 BORDERLINE DIABETES MELLITUS: ICD-10-CM

## 2019-05-13 DIAGNOSIS — E78.5 HYPERLIPIDEMIA LDL GOAL <100: ICD-10-CM
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
## 2019-05-13 DIAGNOSIS — R73.01 ELEVATED FASTING GLUCOSE: ICD-10-CM

## 2019-05-13 DIAGNOSIS — Z85.828 HISTORY OF BASAL CELL CARCINOMA: ICD-10-CM

## 2019-05-13 DIAGNOSIS — I70.0 ATHEROSCLEROSIS OF AORTA: ICD-10-CM

## 2019-05-13 DIAGNOSIS — Z87.19 HISTORY OF PANCREATITIS: ICD-10-CM

## 2019-05-13 LAB
ANION GAP SERPL CALC-SCNC: 11 MMOL/L (ref 8–16)
BUN SERPL-MCNC: 19 MG/DL (ref 8–23)
CALCIUM SERPL-MCNC: 10.2 MG/DL (ref 8.7–10.5)
CHLORIDE SERPL-SCNC: 101 MMOL/L (ref 95–110)
CO2 SERPL-SCNC: 29 MMOL/L (ref 23–29)
CREAT SERPL-MCNC: 1 MG/DL (ref 0.5–1.4)
EST. GFR  (AFRICAN AMERICAN): >60 ML/MIN/1.73 M^2
EST. GFR  (NON AFRICAN AMERICAN): >60 ML/MIN/1.73 M^2
ESTIMATED AVG GLUCOSE: 123 MG/DL (ref 68–131)
GLUCOSE SERPL-MCNC: 121 MG/DL (ref 70–110)
HBA1C MFR BLD HPLC: 5.9 % (ref 4–5.6)
POTASSIUM SERPL-SCNC: 3.9 MMOL/L (ref 3.5–5.1)
SODIUM SERPL-SCNC: 141 MMOL/L (ref 136–145)

## 2019-05-13 PROCEDURE — 3079F PR MOST RECENT DIASTOLIC BLOOD PRESSURE 80-89 MM HG: ICD-10-PCS | Mod: HCWC,CPTII,S$GLB, | Performed by: NURSE PRACTITIONER

## 2019-05-13 PROCEDURE — 3075F SYST BP GE 130 - 139MM HG: CPT | Mod: HCWC,CPTII,S$GLB, | Performed by: NURSE PRACTITIONER

## 2019-05-13 PROCEDURE — G0439 PR MEDICARE ANNUAL WELLNESS SUBSEQUENT VISIT: ICD-10-PCS | Mod: HCWC,S$GLB,, | Performed by: NURSE PRACTITIONER

## 2019-05-13 PROCEDURE — G0439 PPPS, SUBSEQ VISIT: HCPCS | Mod: HCWC,S$GLB,, | Performed by: NURSE PRACTITIONER

## 2019-05-13 PROCEDURE — 80048 BASIC METABOLIC PNL TOTAL CA: CPT | Mod: HCWC

## 2019-05-13 PROCEDURE — 90714 TD VACCINE GREATER THAN OR EQUAL TO 7YO PRESERVATIVE FREE IM: ICD-10-PCS | Mod: HCWC,S$GLB,, | Performed by: NURSE PRACTITIONER

## 2019-05-13 PROCEDURE — 90714 TD VACC NO PRESV 7 YRS+ IM: CPT | Mod: HCWC,S$GLB,, | Performed by: NURSE PRACTITIONER

## 2019-05-13 PROCEDURE — 99999 PR PBB SHADOW E&M-EST. PATIENT-LVL III: CPT | Mod: PBBFAC,HCWC,, | Performed by: NURSE PRACTITIONER

## 2019-05-13 PROCEDURE — 3075F PR MOST RECENT SYSTOLIC BLOOD PRESS GE 130-139MM HG: ICD-10-PCS | Mod: HCWC,CPTII,S$GLB, | Performed by: NURSE PRACTITIONER

## 2019-05-13 PROCEDURE — 36415 COLL VENOUS BLD VENIPUNCTURE: CPT | Mod: HCWC

## 2019-05-13 PROCEDURE — 99999 PR PBB SHADOW E&M-EST. PATIENT-LVL III: ICD-10-PCS | Mod: PBBFAC,HCWC,, | Performed by: NURSE PRACTITIONER

## 2019-05-13 PROCEDURE — 90471 IMMUNIZATION ADMIN: CPT | Mod: HCWC,S$GLB,, | Performed by: NURSE PRACTITIONER

## 2019-05-13 PROCEDURE — 3079F DIAST BP 80-89 MM HG: CPT | Mod: HCWC,CPTII,S$GLB, | Performed by: NURSE PRACTITIONER

## 2019-05-13 PROCEDURE — 83036 HEMOGLOBIN GLYCOSYLATED A1C: CPT | Mod: HCWC

## 2019-05-13 PROCEDURE — 90471 TD VACCINE GREATER THAN OR EQUAL TO 7YO PRESERVATIVE FREE IM: ICD-10-PCS | Mod: HCWC,S$GLB,, | Performed by: NURSE PRACTITIONER

## 2019-05-13 NOTE — Clinical Note
Dr. Harvey,Can you please clarify the dose of Losartan that Mr Mueller is to take. He is currently taking 25mg daily although rx is for 1/2 tablet.Haile

## 2019-05-13 NOTE — PROGRESS NOTES
"Ezekiel Mueller presented for a  Medicare AWV and comprehensive Health Risk Assessment today. The following components were reviewed and updated:    · Medical history  · Family History  · Social history  · Allergies and Current Medications  · Health Risk Assessment  · Health Maintenance  · Care Team     ** See Completed Assessments for Annual Wellness Visit within the encounter summary.**       The following assessments were completed:  · Living Situation  · CAGE  · Depression Screening  · Timed Get Up and Go  · Whisper Test  · Cognitive Function Screening  ·   ·   · Nutrition Screening  · ADL Screening  · PAQ Screening    Vitals:    05/13/19 0810   BP: 132/82   Pulse: 69   SpO2: 96%   Weight: 103.4 kg (227 lb 15.3 oz)   Height: 6' 1" (1.854 m)     Body mass index is 30.08 kg/m².  Physical Exam   Constitutional: He is oriented to person, place, and time. He appears well-developed and well-nourished.   HENT:   Head: Normocephalic and atraumatic.   Nose: Nose normal.   Eyes: Conjunctivae and EOM are normal.   Cardiovascular: Normal rate, regular rhythm, normal heart sounds and intact distal pulses.   Pulmonary/Chest: Effort normal and breath sounds normal.   Musculoskeletal: Normal range of motion.   Neurological: He is alert and oriented to person, place, and time.   Skin: Skin is warm and dry.   Psychiatric: He has a normal mood and affect. His behavior is normal. Judgment and thought content normal.   Nursing note and vitals reviewed.        Diagnoses and health risks identified today and associated recommendations/orders:    1. Encounter for preventive health examination  Assessment performed. Health maintenance updated. Chart review completed.  Tetanus today. Prevnar prescription given.  Declined shingles.    2. Dog scratch  - (In Office Administered) Td Vaccine - Preservative Free    3. Atherosclerosis of aorta  Noted on imaging. Stable with blood pressure control and statin therapy. Followed by PCP.    4. " Pulmonary hypertension  Noted on imaging. Stable with blood pressure control and statin therapy. Followed by PCP.    5. Essential hypertension  Chronic. Stable with current regimen. Followed by PCP.    6. Hyperlipidemia LDL goal <100  Chronic. Stable.Has not started statin therapy yet.  Followed by PCP.  Component      Latest Ref Rng & Units 3/25/2019   Cholesterol      120 - 199 mg/dL 198   Triglycerides      30 - 150 mg/dL 142   HDL      40 - 75 mg/dL 47   LDL Cholesterol      63.0 - 159.0 mg/dL 122.6   Hdl/Cholesterol Ratio      20.0 - 50.0 % 23.7   Total Cholesterol/HDL Ratio      2.0 - 5.0 4.2   Non-HDL Cholesterol      mg/dL 151     7. Borderline diabetes mellitus  Chronic. Stable with current regimen. Followed by PCP.    8. History of pancreatitis  Noted.    9. History of basal cell carcinoma  Noted. Followed by Dermatology.    10. BMI 30.0-30.9,adult  Chronic. Discussed exercise and diet. Followed by PCP.    11. Wound of right lower extremity, initial encounter  - (In Office Administered) Td Vaccine - Preservative Free      Provided Ezekiel with a 5-10 year written screening schedule and personal prevention plan. Recommendations were developed using the USPSTF age appropriate recommendations. Education, counseling, and referrals were provided as needed. After Visit Summary printed and given to patient which includes a list of additional screenings\tests needed.    Follow up for Annual Wellness Visit in 1 year, follow up with PCP as instructed, ;sooner if problems.    KIRK Galvin

## 2019-05-13 NOTE — PATIENT INSTRUCTIONS
+  Counseling and Referral of Other Preventative  (Italic type indicates deductible and co-insurance are waived)    Patient Name: Ezekiel Mueller  Today's Date: 5/13/2019    Health Maintenance       Date Due Completion Date    TETANUS VACCINE 10/27/1964 ---    Shingles Vaccine (1 of 2) 10/27/1996 ---    Pneumococcal Vaccine (65+ High/Highest Risk) (1 of 2 - PCV13) 10/27/2011 ---    Influenza Vaccine 08/01/2019 9/14/2018    Colonoscopy 08/06/2019 8/6/2014 (Done)    Override on 8/6/2014: Done ([facility])    High Dose Statin 04/12/2020 4/12/2019    Lipid Panel 03/25/2024 3/25/2019        Orders Placed This Encounter   Procedures    (In Office Administered) Td Vaccine - Preservative Free     The following information is provided to all patients.  This information is to help you find resources for any of the problems found today that may be affecting your health:                Living healthy guide: www.[facility].[place].[facility]      Understanding Diabetes: www.diabetes.org      Eating healthy: www.cdc.gov/healthyweight      CDC home safety checklist: www.cdc.gov/steadi/patient.html      Agency on Aging: www.goea.[place].gov      Alcoholics anonymous (AA): www.aa.org      Physical Activity: www.ghazala.nih.gov/ab1xafi      Tobacco use: www.quitwithusla.org

## 2019-06-12 NOTE — PROGRESS NOTES
HEPATOLOGY CLINIC VISIT NOTE     REFERRING PROVIDER: Dr. Al Harvey  REASON FOR VISIT: elevated liver enzymes     HISTORY: This is a 73 y.o. White male here for follow up of fatty liver. He was last seen by me 06/2018. He has a h/o elevated transaminases since 02/2016. On most recent labs, AST 29, ALT 46. He has normal synthetic liver function. His PLTs are preserved. He has had negative HCV screening in the past. He denies drug use. He denies ETOH use. He denies FH of liver disease.     He had an U/S 01/2017 noting hepatic steatosis. He had a fibroscan at last visit that was consistent with F1-F2 fibrosis. BMI is 30.25, since last visit, weight down 2 lbs. Pt admits to not following a strict diet.     PMH is significant for HTN, HLD, and prediabetes.    Liver staging:  Fibroscan F1-F2   AST 29, ALT 46  Tbili WNL  Albumin WNL  PLTs 190    Denies jaundice, dark urine, abdominal distention, hematemesis, melena, slowed mentation.   No abnormal skin rashes. No generalized joint pain.                   Past Medical History:   Diagnosis Date    Basal cell carcinoma 02/2015    R eyelid    Borderline diabetes 2016    [facility]ps     [facility] none [facility]    Elevated blood uric acid level 2016    Fatty liver     Hypertension     diagnosed 2005    Pancreatitis 07/2015    etiology unclear     Past Surgical History:   Procedure Laterality Date    CHOLECYSTECTOMY  2012    COLONOSCOPY      x2    ERCP N/A 7/27/2015    Performed by Hugh [facility] MD at [facility] (2ND FLR)    SKIN CANCER EXCISION      ULTRASOUND-ENDOSCOPIC-UPPER N/A 7/27/2015    Performed by Hugh Luna MD at [facility] END[facility] (2ND FLR)     FAMILY HISTORY: Negative for liver disease    SOCIAL HISTORY:   Social History     Tobacco Use   Smoking Status Never Smoker   Smokeless Tobacco Never Used     Social History     Substance and Sexual Activity   Alcohol Use No     Social History     Substance and Sexual  Activity   Drug Use No     ROS:   No fever, chills, weight loss  No chest pain, dyspnea, cough  No abdominal pain, nausea, vomiting  No headaches, visual changes  No lower extremity edema  No depression or anxiety    PHYSICAL EXAM:  Friendly White male, in no acute distress; alert and oriented to person, place and time  VITALS: reviewed  HEENT: Sclerae anicteric.   NECK: Supple  LUNGS: Normal respiratory effort.  ABDOMEN: Flat, soft, nontender.  SKIN: Warm and dry. No jaundice, No obvious rashes.   EXTREMITIES: No lower extremity edema  NEURO/PSYCH: Normal gate. Memory intact. Thought and speech pattern appropriate. Behavior normal. No depression or anxiety noted.    RECENT LABS:  Lab Results   Component Value Date    WBC 6.33 06/26/2018    HGB 14.8 06/26/2018     06/26/2018     Lab Results   Component Value Date    INR 1.0 06/26/2018     Lab Results   Component Value Date    AST 29 03/25/2019    ALT 46 (H) 03/25/2019    BILITOT 0.7 03/25/2019    ALBUMIN 4.7 03/25/2019    ALKPHOS 47 (L) 03/25/2019    CREATININE 1.0 05/13/2019    BUN 19 05/13/2019     05/13/2019    K 3.9 05/13/2019     RECENT IMAGING:  U/S abdomen 01/2017Narrative     Limited abdominal ultrasound    Comparison: None    Results: The liver is enlarged in size measuring 18.8 cm.  It demonstrates increased echogenicity throughout and attenuates the sound consistent with fatty liver infiltration.The common duct measures 4 mm , which is normal.  The gallbladder has been removed.  The pancreas is obscured by overlying intestinal gas.  The right kidney measures 11.9 x 6.6 x 6.0  cm.  There is a small cyst in the lower pole measuring 1.6 cm.  No ascites.   Impression          1. Cholecystectomy.  2.  Mild hepatomegaly and fatty liver infiltration.  3.  Small right kidney cyst.     ASSESSMENT  73 y.o. White male with:  1. ELEVATED LIVER ENZYMESHEPATIC STEATOSIS   -- fibroscan F1-F2  -- serological work up negative for AIH, PBC, viral hepatitis,  RITA cook  -- risk factors: prediabetes, HTN, and HLD  -- discussed diet, exercise and weight loss    EDUCATION:  We discussed the manifestations of NAFLD. At this time there is no FDA approved therapy for NAFLD.   The patient and I discussed the importance of diet, exercise, and weight loss for management of NAFLD. We discussed a low fat, low carb/low sugar, high fiber diet, and a goal of 30 minutes of exercise 5 times per week.   Pt is aware that fatty liver can progress to steatohepatitis and possibly to cirrhosis, putting one at increased risk for liver cancer, liver failure, and death.        PLAN:  1. F/u in 1 year with repeat fibroscan     Thank you for allowing me to participate in the care of Ezekiel Berry PA-C

## 2019-06-14 ENCOUNTER — TELEPHONE (OUTPATIENT)
Dept: HEPATOLOGY | Facility: CLINIC | Age: 74
End: 2019-06-14

## 2019-06-14 ENCOUNTER — OFFICE VISIT (OUTPATIENT)
Dept: HEPATOLOGY | Facility: CLINIC | Age: 74
End: 2019-06-14
Payer: MEDICARE

## 2019-06-14 VITALS
TEMPERATURE: 97 F | WEIGHT: 229.25 LBS | OXYGEN SATURATION: 96 % | HEART RATE: 66 BPM | HEIGHT: 73 IN | BODY MASS INDEX: 30.38 KG/M2 | RESPIRATION RATE: 16 BRPM | SYSTOLIC BLOOD PRESSURE: 152 MMHG | DIASTOLIC BLOOD PRESSURE: 85 MMHG

## 2019-06-14 DIAGNOSIS — R74.8 ELEVATED LIVER ENZYMES: ICD-10-CM

## 2019-06-14 DIAGNOSIS — K76.0 FATTY LIVER: Primary | ICD-10-CM

## 2019-06-14 PROCEDURE — 1100F PTFALLS ASSESS-DOCD GE2>/YR: CPT | Mod: HCWC,CPTII,S$GLB, | Performed by: PHYSICIAN ASSISTANT

## 2019-06-14 PROCEDURE — 3077F PR MOST RECENT SYSTOLIC BLOOD PRESSURE >= 140 MM HG: ICD-10-PCS | Mod: HCWC,CPTII,S$GLB, | Performed by: PHYSICIAN ASSISTANT

## 2019-06-14 PROCEDURE — 3077F SYST BP >= 140 MM HG: CPT | Mod: HCWC,CPTII,S$GLB, | Performed by: PHYSICIAN ASSISTANT

## 2019-06-14 PROCEDURE — 99999 PR PBB SHADOW E&M-EST. PATIENT-LVL IV: CPT | Mod: PBBFAC,HCWC,, | Performed by: PHYSICIAN ASSISTANT

## 2019-06-14 PROCEDURE — 3079F PR MOST RECENT DIASTOLIC BLOOD PRESSURE 80-89 MM HG: ICD-10-PCS | Mod: HCWC,CPTII,S$GLB, | Performed by: PHYSICIAN ASSISTANT

## 2019-06-14 PROCEDURE — 99214 PR OFFICE/OUTPT VISIT, EST, LEVL IV, 30-39 MIN: ICD-10-PCS | Mod: HCWC,S$GLB,, | Performed by: PHYSICIAN ASSISTANT

## 2019-06-14 PROCEDURE — 3288F FALL RISK ASSESSMENT DOCD: CPT | Mod: HCWC,CPTII,S$GLB, | Performed by: PHYSICIAN ASSISTANT

## 2019-06-14 PROCEDURE — 3079F DIAST BP 80-89 MM HG: CPT | Mod: HCWC,CPTII,S$GLB, | Performed by: PHYSICIAN ASSISTANT

## 2019-06-14 PROCEDURE — 99999 PR PBB SHADOW E&M-EST. PATIENT-LVL IV: ICD-10-PCS | Mod: PBBFAC,HCWC,, | Performed by: PHYSICIAN ASSISTANT

## 2019-06-14 PROCEDURE — 99214 OFFICE O/P EST MOD 30 MIN: CPT | Mod: HCWC,S$GLB,, | Performed by: PHYSICIAN ASSISTANT

## 2019-06-14 PROCEDURE — 1100F PR PT FALLS ASSESS DOC 2+ FALLS/FALL W/INJURY/YR: ICD-10-PCS | Mod: HCWC,CPTII,S$GLB, | Performed by: PHYSICIAN ASSISTANT

## 2019-06-14 PROCEDURE — 3288F PR FALLS RISK ASSESSMENT DOCUMENTED: ICD-10-PCS | Mod: HCWC,CPTII,S$GLB, | Performed by: PHYSICIAN ASSISTANT

## 2019-06-14 NOTE — TELEPHONE ENCOUNTER
----- Message from Genaro Berry PA-C sent at 6/14/2019 10:10 AM CDT -----  Please recall for f/u visit with fibroscan in 1 year  Thanks

## 2019-06-28 ENCOUNTER — OFFICE VISIT (OUTPATIENT)
Dept: INTERNAL MEDICINE | Facility: CLINIC | Age: 74
End: 2019-06-28
Payer: MEDICARE

## 2019-06-28 VITALS
BODY MASS INDEX: 30.36 KG/M2 | WEIGHT: 229.06 LBS | DIASTOLIC BLOOD PRESSURE: 78 MMHG | HEIGHT: 73 IN | SYSTOLIC BLOOD PRESSURE: 132 MMHG | HEART RATE: 69 BPM | OXYGEN SATURATION: 97 %

## 2019-06-28 DIAGNOSIS — M62.08 DIASTASIS RECTI: ICD-10-CM

## 2019-06-28 DIAGNOSIS — K45.8 OTHER SPECIFIED ABDOMINAL HERNIA WITHOUT OBSTRUCTION OR GANGRENE: Primary | ICD-10-CM

## 2019-06-28 PROCEDURE — 1100F PR PT FALLS ASSESS DOC 2+ FALLS/FALL W/INJURY/YR: ICD-10-PCS | Mod: HCWC,CPTII,S$GLB, | Performed by: PHYSICIAN ASSISTANT

## 2019-06-28 PROCEDURE — 1100F PTFALLS ASSESS-DOCD GE2>/YR: CPT | Mod: HCWC,CPTII,S$GLB, | Performed by: PHYSICIAN ASSISTANT

## 2019-06-28 PROCEDURE — 99213 OFFICE O/P EST LOW 20 MIN: CPT | Mod: HCWC,S$GLB,, | Performed by: PHYSICIAN ASSISTANT

## 2019-06-28 PROCEDURE — 3075F SYST BP GE 130 - 139MM HG: CPT | Mod: HCWC,CPTII,S$GLB, | Performed by: PHYSICIAN ASSISTANT

## 2019-06-28 PROCEDURE — 3288F PR FALLS RISK ASSESSMENT DOCUMENTED: ICD-10-PCS | Mod: HCWC,CPTII,S$GLB, | Performed by: PHYSICIAN ASSISTANT

## 2019-06-28 PROCEDURE — 99999 PR PBB SHADOW E&M-EST. PATIENT-LVL IV: CPT | Mod: PBBFAC,HCWC,, | Performed by: PHYSICIAN ASSISTANT

## 2019-06-28 PROCEDURE — 3078F DIAST BP <80 MM HG: CPT | Mod: HCWC,CPTII,S$GLB, | Performed by: PHYSICIAN ASSISTANT

## 2019-06-28 PROCEDURE — 3078F PR MOST RECENT DIASTOLIC BLOOD PRESSURE < 80 MM HG: ICD-10-PCS | Mod: HCWC,CPTII,S$GLB, | Performed by: PHYSICIAN ASSISTANT

## 2019-06-28 PROCEDURE — 99999 PR PBB SHADOW E&M-EST. PATIENT-LVL IV: ICD-10-PCS | Mod: PBBFAC,HCWC,, | Performed by: PHYSICIAN ASSISTANT

## 2019-06-28 PROCEDURE — 99213 PR OFFICE/OUTPT VISIT, EST, LEVL III, 20-29 MIN: ICD-10-PCS | Mod: HCWC,S$GLB,, | Performed by: PHYSICIAN ASSISTANT

## 2019-06-28 PROCEDURE — 3288F FALL RISK ASSESSMENT DOCD: CPT | Mod: HCWC,CPTII,S$GLB, | Performed by: PHYSICIAN ASSISTANT

## 2019-06-28 PROCEDURE — 3075F PR MOST RECENT SYSTOLIC BLOOD PRESS GE 130-139MM HG: ICD-10-PCS | Mod: HCWC,CPTII,S$GLB, | Performed by: PHYSICIAN ASSISTANT

## 2019-06-28 NOTE — PROGRESS NOTES
"Subjective:       Patient ID: Ezekiel Mueller is a 73 y.o. male.    Chief Complaint: Mass    HPI     Established pt of Al Harvey MD (new to me)    C/o "mass" to upper abdomen. Noticed about 2 months ago. Only noticeably when sitting up from laying. He denies any pain. No bowel changes or unexpected weight loss. Hx of laparoscopic cholecystectomy    Review of patient's allergies indicates:  No Known Allergies    Past Medical History:   Diagnosis Date    Basal cell carcinoma 02/2015    R eyelid    Borderline diabetes 2016    [facility]ps     [facility]    Elevated blood uric acid level 2016    Fatty liver     Hypertension     diagnosed 2005    Pancreatitis 07/2015    etiology unclear     Social History     Tobacco Use    Smoking status: Never Smoker    Smokeless tobacco: Never Used   Substance Use Topics    Alcohol use: No    Drug use: No         Review of Systems   Constitutional: Negative for chills, fever and unexpected weight change.   Eyes: Negative for visual disturbance.   Respiratory: Negative for cough and shortness of breath.    Cardiovascular: Negative for chest pain and leg swelling.   Gastrointestinal: Negative for abdominal pain, constipation, diarrhea, nausea and vomiting.   Skin: Negative for rash.   Neurological: Negative for weakness and headaches.       Objective: /78   Pulse 69   Ht 6' 1" (1.854 m)   Wt 103.9 kg (229 lb 0.9 oz)   SpO2 97%   BMI 30.22 kg/m²         Physical Exam   Constitutional: He appears well-developed and well-nourished. No distress.   HENT:   Head: Normocephalic and atraumatic.   Mouth/Throat: Oropharynx is clear and moist.   Cardiovascular: Normal rate and regular rhythm. Exam reveals no friction rub.   No murmur heard.  Pulmonary/Chest: Effort normal and breath sounds normal. He has no wheezes. He has no rales.   Abdominal: Soft. Bowel sounds are normal. There is no tenderness. There is no rebound and no " guarding.   Obese abdomen  Diffuse fusiform bulge noted as patient sits up from supine position  Non tender.   Well healed laparoscopic incisions  small reducible umbilical hernia, non tender   Neurological: He is alert.   Skin: Skin is warm and dry. Capillary refill takes less than 2 seconds. No rash noted.   Psychiatric: He has a normal mood and affect.   Vitals reviewed.      Assessment:       1. Other specified abdominal hernia without obstruction or gangrene    2. Diastasis recti        Plan:         Ezekiel was seen today for mass.    Diagnoses and all orders for this visit:    Other specified abdominal hernia without obstruction or gangrene    Diastasis recti      Discussed diastasis recti and its likely cause as weakening of abdominal wall tissues  Encouraged weight loss and exercise.   Discussed warning signs of hernia.   RTC prn and PCP f/u    Shy Coyle PA-C

## 2019-06-28 NOTE — PATIENT INSTRUCTIONS
Hernia (Adult)    A hernia can happen when there is a weakness or defect in the wall of the abdomen or groin. Intestines or nearby tissues may move from their usual location and push through the weakness in the wall. This can cause a hernia (bulge) you may see or feel.  Causes and Risk Factors   A hernia may be present at birth. Or it may be caused by the wear and tear of daily living. Certain factors can make a hernia more likely. These can include:  · Heavy lifting  · Straining, whether from lifting, movement, or constipation  · Chronic cough  · Injury to the abdominal wall  · Excess weight  · Pregnancy  · Prior surgery  · Older age  · Family history of hernia  Symptoms  Symptoms of a hernia may come on suddenly. Or they may appear slowly over time. Some common symptoms include:  · Bulge in the groin area, around the navel, or in the scrotum (the bulge may get bigger when you stand and go away when you lie down)  · Pain or pressure around the bulge  · Pain during activities such as lifting, coughing, or sneezing  · A feeling of weakness or pressure in the groin  · Pain or swelling in the scrotum  Types of hernias  There are different types of hernia. The type you have depends on its location:  · Inguinal: This type is in the groin or scrotum. It is more common in men.  · Femoral: This type is in the groin, upper thigh (where the leg bends), or labia. It is more common in women.  · Ventral: This type is in the abdominal wall.  · Umbilical: This type occurs around the navel (belly button).  · Incisional: This type occurs at the site of a previous surgery.  The condition of the hernia can help determine how urgently it needs to be treated.  · Reducible: It goes back in by itself, or it can be pushed back in.  · Irreducible: It cant be pushed back in.  · Incarcerated/Strangulated: The intestine is trapped (incarcerated). If this happens, you wont be able to push the bulge back in. If the incarcerated hernia isnt  treated, it may become strangulated. This means the area loses blood supply and the tissue may die. This requires emergency surgery! Treatment is needed right away!  In most cases, a hernia will not heal on its own. Surgery is usually needed to repair the defect in the abdominal wall or groin. Youll be told more about surgery, if needed.  If your symptoms are not severe, treatment may sometimes be delayed. In such cases, regular follow-up visits with the provider will be needed. Youll be asked to keep track of your symptoms and to watch for signs of more serious problems. You may also be given guidelines similar to the home care instructions below.  Home Care  To help keep a hernia from getting worse, you may be advised to:  · Avoid heavy lifting and straining as directed.  · Take steps to prevent constipation, such as eating more fiber and drinking more water. This may help reduce straining that can occur when having a bowel movement. Reducing straining may help keep your symptoms from getting worse.  · Maintain a healthy weight or lose excess weight. This can help reduce strain on abdominal muscles and tissues.  · Stop smoking. This can help prevent coughing that may also strain abdominal muscles and tissues.  Follow-up care  Follow up with your healthcare provider, or as directed. If imaging tests were done, they will be reviewed a doctor. You will be told the results and any new findings that may affect your care.  When to seek medical advice  Call your healthcare provider right away if any of these occur:  · Hernia hardens, swells, or grows larger  · Hernia can no longer be pushed back in  · Pain moves to the lower right abdomen (just below the waistline), or spreads to the back  Call 911  Call 911 right away if any of these occur:  · Nausea and vomiting  · Severe pain, redness, or tenderness in the area near the hernia  · Pain worsens quickly and doesnt get better  · Inability to have a bowel movement or  pass gas  · Fever of 100.4°F (38°C) or higher  · Trouble breathing  · Fainting  · Rapid heart rate  · Vomiting blood  · Large amounts of blood in stool  Date Last Reviewed: 6/9/2015  © 9408-7148 [employer]. [address] [place] [address], [place] [address]. All rights reserved. This information is not intended as a substitute for professional medical care. Always follow your healthcare professional's instructions.

## 2019-07-26 DIAGNOSIS — I10 HTN, GOAL BELOW 140/90: ICD-10-CM

## 2019-07-26 DIAGNOSIS — I10 ESSENTIAL HYPERTENSION: ICD-10-CM

## 2019-07-26 RX ORDER — AMLODIPINE BESYLATE 10 MG/1
10 TABLET ORAL DAILY
Qty: 90 TABLET | Refills: 1 | Status: SHIPPED | OUTPATIENT
Start: 2019-07-26 | End: 2019-11-14 | Stop reason: SDUPTHER

## 2019-07-26 RX ORDER — HYDROCHLOROTHIAZIDE 25 MG/1
25 TABLET ORAL DAILY
Qty: 90 TABLET | Refills: 1 | Status: SHIPPED | OUTPATIENT
Start: 2019-07-26 | End: 2019-11-14 | Stop reason: SDUPTHER

## 2019-09-19 ENCOUNTER — IMMUNIZATION (OUTPATIENT)
Dept: INTERNAL MEDICINE | Facility: CLINIC | Age: 74
End: 2019-09-19
Payer: MEDICARE

## 2019-09-19 ENCOUNTER — OFFICE VISIT (OUTPATIENT)
Dept: INTERNAL MEDICINE | Facility: CLINIC | Age: 74
End: 2019-09-19
Payer: MEDICARE

## 2019-09-19 VITALS
HEART RATE: 71 BPM | TEMPERATURE: 98 F | HEIGHT: 73 IN | WEIGHT: 231.25 LBS | DIASTOLIC BLOOD PRESSURE: 70 MMHG | OXYGEN SATURATION: 95 % | BODY MASS INDEX: 30.65 KG/M2 | SYSTOLIC BLOOD PRESSURE: 130 MMHG

## 2019-09-19 DIAGNOSIS — E78.5 HYPERLIPIDEMIA LDL GOAL <100: ICD-10-CM

## 2019-09-19 DIAGNOSIS — E79.0 HYPERURICEMIA: ICD-10-CM

## 2019-09-19 DIAGNOSIS — Z00.00 ANNUAL PHYSICAL EXAM: Primary | ICD-10-CM

## 2019-09-19 DIAGNOSIS — K42.9 UMBILICAL HERNIA WITHOUT OBSTRUCTION AND WITHOUT GANGRENE: ICD-10-CM

## 2019-09-19 DIAGNOSIS — Z12.11 SCREENING FOR MALIGNANT NEOPLASM OF COLON: ICD-10-CM

## 2019-09-19 DIAGNOSIS — R73.03 BORDERLINE DIABETES MELLITUS: ICD-10-CM

## 2019-09-19 DIAGNOSIS — Z85.828 HISTORY OF BASAL CELL CARCINOMA: ICD-10-CM

## 2019-09-19 DIAGNOSIS — K76.0 FATTY LIVER: ICD-10-CM

## 2019-09-19 DIAGNOSIS — I10 ESSENTIAL HYPERTENSION: ICD-10-CM

## 2019-09-19 DIAGNOSIS — R73.01 IMPAIRED FASTING BLOOD SUGAR: ICD-10-CM

## 2019-09-19 DIAGNOSIS — J06.9 UPPER RESPIRATORY TRACT INFECTION, UNSPECIFIED TYPE: ICD-10-CM

## 2019-09-19 PROCEDURE — 99397 PER PM REEVAL EST PAT 65+ YR: CPT | Mod: HCWC,S$GLB,, | Performed by: INTERNAL MEDICINE

## 2019-09-19 PROCEDURE — 3078F PR MOST RECENT DIASTOLIC BLOOD PRESSURE < 80 MM HG: ICD-10-PCS | Mod: HCWC,CPTII,S$GLB, | Performed by: INTERNAL MEDICINE

## 2019-09-19 PROCEDURE — G0008 ADMIN INFLUENZA VIRUS VAC: HCPCS | Mod: HCWC,S$GLB,, | Performed by: INTERNAL MEDICINE

## 2019-09-19 PROCEDURE — 90662 FLU VACCINE - HIGH DOSE (65+) PRESERVATIVE FREE IM: ICD-10-PCS | Mod: HCWC,S$GLB,, | Performed by: INTERNAL MEDICINE

## 2019-09-19 PROCEDURE — 99999 PR PBB SHADOW E&M-EST. PATIENT-LVL IV: CPT | Mod: PBBFAC,HCWC,, | Performed by: INTERNAL MEDICINE

## 2019-09-19 PROCEDURE — G0008 FLU VACCINE - HIGH DOSE (65+) PRESERVATIVE FREE IM: ICD-10-PCS | Mod: HCWC,S$GLB,, | Performed by: INTERNAL MEDICINE

## 2019-09-19 PROCEDURE — 99397 PR PREVENTIVE VISIT,EST,65 & OVER: ICD-10-PCS | Mod: HCWC,S$GLB,, | Performed by: INTERNAL MEDICINE

## 2019-09-19 PROCEDURE — 90662 IIV NO PRSV INCREASED AG IM: CPT | Mod: HCWC,S$GLB,, | Performed by: INTERNAL MEDICINE

## 2019-09-19 PROCEDURE — 3075F SYST BP GE 130 - 139MM HG: CPT | Mod: HCWC,CPTII,S$GLB, | Performed by: INTERNAL MEDICINE

## 2019-09-19 PROCEDURE — 99999 PR PBB SHADOW E&M-EST. PATIENT-LVL IV: ICD-10-PCS | Mod: PBBFAC,HCWC,, | Performed by: INTERNAL MEDICINE

## 2019-09-19 PROCEDURE — 3078F DIAST BP <80 MM HG: CPT | Mod: HCWC,CPTII,S$GLB, | Performed by: INTERNAL MEDICINE

## 2019-09-19 PROCEDURE — 3075F PR MOST RECENT SYSTOLIC BLOOD PRESS GE 130-139MM HG: ICD-10-PCS | Mod: HCWC,CPTII,S$GLB, | Performed by: INTERNAL MEDICINE

## 2019-09-19 NOTE — Clinical Note
Ellie - can you help get this patient[facility]? He thinks [facility] had his pneumonia vaccines there

## 2019-09-19 NOTE — PATIENT INSTRUCTIONS
Call in to schedule your follow up with Dr Kelly with Dermatology!    For your symptoms (congestion, cough):  1. Saline nasal rinse (NeilMed, [employer] Simply Saline) at least 2-3 times/day  2. Flonase (fluticasone) or other steroid nasal spray - twice a day for 1 week, then once a day thereafter  3. Claritin (loratadine), Zyrtec (cetirizine), or Allegra (fexofenadine) once a day  4. Mucinex (guaifenesin) every 8-12 hours with plenty of water. If you are having a cough, you can use Mucinex-DM (guaifenesin-dextromethorphan).   5. Hot soup, hot tea with honey and lemon.  6. Plenty of sleep!    These medications are ok to take even if you have high blood pressure.   Ok to take Coricidin HBP for congestion as well.       There is a new shingles vaccine available (Shingrix) that is both safer and more effective than the old shingles vaccine (Zostavax). I do recommend that you get this, as it can help prevent a shingles outbreak even if you have had one in the past.   For this vaccine, you will need a series of 2 shots, first one and then a second 2 to 6 months later.  Please check in with the [facility] or with your local pharmacy about getting this vaccine; they should be able to check on insurance coverage and whether there is any cost to you.

## 2019-09-19 NOTE — PROGRESS NOTES
Subjective:       Patient ID: Ezekiel Mueller is a 73 y.o. male.    Chief Complaint: Follow-up    HPI  74 y/o man with HTN, HLD, borderline diabetes, h/o basal cell carcinoma of eyelid here for follow up / annual exam    Working out with weights about 2 days/week  Occasional snoring when very tired, not otherwise. Sleeping well.     Recent URI symptoms in the past 1-2 days, congestion, postnasal drip, slight sore throat. No sinus or ear pressure or pain. No cough so far.    HTN - diagnosed just after [place] (~2005). On norvasc 10mg and HCTZ 25mg; losartan 25mg added but this caused orthostatic hypotension / pre-syncope.  Tried 1/2 tablet of losartan, but this caused diarrhea, nausea, dizziness; did not continue.  Generally BP at home within goal range  Notices that his BP sometimes goes high when he checks immediately after working in his garden and bending over - up to SBP 170s. This comes down to 130s after sitting down for 5-10 min.  Not interested in digital HTN program    Dyslipidemia - LDL goal <100 ideally.   Avoiding fried foods  On statin     Fatty liver - saw hepatology, labs done which were overall unremarkable other than mild elevated LFT. Fibroscan 6/2018 showed stage 1-2 fibrosis. Planned for follow up at 1 year in June with repeat fibroscan    H/o borderline diabetes - A1c rechecked 6/25/18, down to 5.9 from 6.3 previously.   On metformin 500mg BID, taking this regularly after meals.  Tries to watch carbs, avoids sugar generally     H/o BCC R eyelid, excision done 2/2015, saw dermatology for skin exam 6/2018, had inflamed SK removed. Missed dermatology appt, needs to reschedule.     Eye exam in 3/2019, doesn't feel like glasses are quite right    Review of Systems   Constitutional: Negative for activity change and unexpected weight change.   HENT: Positive for congestion, postnasal drip and sore throat. Negative for ear pain and sinus pain.    Eyes: Positive for visual disturbance (as noted). Negative  "for pain and redness.   Respiratory: Negative for cough (rarely) and shortness of breath.    Cardiovascular: Negative for chest pain, palpitations and leg swelling.   Gastrointestinal: Negative for abdominal pain, blood in stool, constipation and diarrhea.   Endocrine: Negative.    Genitourinary: Negative for difficulty urinating and urgency.   Musculoskeletal: Negative for arthralgias and joint swelling.   Skin: Negative for rash.   Neurological: Negative for weakness.   Psychiatric/Behavioral: Negative for dysphoric mood and sleep disturbance. The patient is not nervous/anxious.          Past medical history, surgical history, and family medical history reviewed and updated as appropriate.    Medications and allergies reviewed.     Objective:          Vitals:    09/19/19 1634   BP: 130/70   BP Location: Left arm   Patient Position: Sitting   BP Method: Large (Manual)   Pulse: 71   Temp: 97.9 °F (36.6 °C)   SpO2: 95%   Weight: 104.9 kg (231 lb 4.2 oz)   Height: 6' 1" (1.854 m)     Body mass index is 30.51 kg/m².  Physical Exam   Constitutional: He is oriented to person, place, and time. He appears well-developed and well-nourished. No distress.   HENT:   Head: Normocephalic and atraumatic.   Nose: Mucosal edema (and erythema of nasal turbinates) present.   Mouth/Throat: Posterior oropharyngeal erythema (mild erythema) present. No oropharyngeal exudate.   L eye ptosis more noticeable than prior, L upper face with chronic changes related to trauma   Eyes: Pupils are equal, round, and reactive to light. Conjunctivae and EOM are normal. No scleral icterus.   Neck: Neck supple.   Cardiovascular: Normal rate, regular rhythm, normal heart sounds and intact distal pulses.   No murmur heard.  Pulmonary/Chest: Effort normal and breath sounds normal. No respiratory distress.   Abdominal: Soft. Bowel sounds are normal. He exhibits no distension. There is no tenderness. A hernia (small reducible umbilical hernia nontender) is " present.   Musculoskeletal: He exhibits no edema or tenderness.   Lymphadenopathy:     He has no cervical adenopathy.   Neurological: He is alert and oriented to person, place, and time. No cranial nerve deficit.   Skin: Skin is warm and dry. No erythema.   Psychiatric: He has a normal mood and affect. His behavior is normal.   Vitals reviewed.      Lab Results   Component Value Date    WBC 6.33 06/26/2018    HGB 14.8 06/26/2018    HCT 44.0 06/26/2018     06/26/2018    CHOL 198 03/25/2019    TRIG 142 03/25/2019    HDL 47 03/25/2019    ALT 46 (H) 03/25/2019    AST 29 03/25/2019     05/13/2019    K 3.9 05/13/2019     05/13/2019    CREATININE 1.0 05/13/2019    BUN 19 05/13/2019    CO2 29 05/13/2019    PSA 0.51 06/25/2018    INR 1.0 06/26/2018    HGBA1C 5.9 (H) 05/13/2019       Assessment:       1. Annual physical exam    2. Screening for malignant neoplasm of colon    3. Essential hypertension    4. Hyperlipidemia LDL goal <100    5. History of basal cell carcinoma    6. Borderline diabetes mellitus    7. Fatty liver    8. Umbilical hernia without obstruction and without gangrene        Plan:   Ezekiel was seen today for follow-up.    Diagnoses and all orders for this visit:    Annual physical exam - Overall stable, doing well. Reviewed chronic and preventive health concerns.    Screening for malignant neoplasm of colon  -     Case request GI: COLONOSCOPY    Essential hypertension  Comments:  at goal, no changes to meds today, counseled re: healthy diet, weight loss    Hyperlipidemia LDL goal <100  Comments:  continue atorvastatin    History of basal cell carcinoma  Comments:  reschedule follow up with Dermatology; should see dermatologist at least yearly    Borderline diabetes mellitus - encouraged working on lower-carb diet, weight loss, regular exercise  He declines adding metformin or another medication to help control blood sugar    Fatty liver - reviewed hepatology note with patient, f/u at 1  year    Umbilical hernia without obstruction and without gangrene - stable, no issues, does not want surgery currently    Mild URI symptoms- Recommended: saline nasal spray or rinse BID-TID, steroid nasal spray BID x 1 week then daily, antihistamine daily, guaifenesin (+dextromethorphan for cough) with plenty of fluids, lots of hot tea / soup, lots of sleep. Contact clinic or RTC for fever, focal facial or ear pain, worsening symptoms, or symptoms that have not improved significantly after 10-14 days.     Health maintenance reviewed with patient.   PSA <1 last year  Colonoscopy ordered  Labs prior to next visit in 6 months, fasting  Flu shot today  Counseled re: shingrix  [facility] - requested [facility] to help verify    Follow up in about 6 months (around 3/19/2020) for hypertension.    Al Harvey MD  Internal Medicine  [facility]  9/19/2019

## 2019-09-22 PROBLEM — K42.9 UMBILICAL HERNIA WITHOUT OBSTRUCTION AND WITHOUT GANGRENE: Status: ACTIVE | Noted: 2019-09-22

## 2019-09-24 DIAGNOSIS — Z86.010 ENCOUNTER FOR COLONOSCOPY DUE TO HISTORY OF COLONIC POLYP: Primary | ICD-10-CM

## 2019-09-24 DIAGNOSIS — Z12.11 ENCOUNTER FOR COLONOSCOPY DUE TO HISTORY OF COLONIC POLYP: Primary | ICD-10-CM

## 2019-09-24 RX ORDER — POLYETHYLENE GLYCOL 3350, SODIUM SULFATE ANHYDROUS, SODIUM BICARBONATE, SODIUM CHLORIDE, POTASSIUM CHLORIDE 236; 22.74; 6.74; 5.86; 2.97 G/4L; G/4L; G/4L; G/4L; G/4L
4 POWDER, FOR SOLUTION ORAL ONCE
Qty: 4000 ML | Refills: 0 | Status: SHIPPED | OUTPATIENT
Start: 2019-09-24 | End: 2019-09-24

## 2019-09-25 ENCOUNTER — PATIENT MESSAGE (OUTPATIENT)
Dept: INTERNAL MEDICINE | Facility: CLINIC | Age: 74
End: 2019-09-25

## 2019-09-25 DIAGNOSIS — E11.65 CONTROLLED TYPE 2 DIABETES MELLITUS WITH HYPERGLYCEMIA, WITHOUT LONG-TERM CURRENT USE OF INSULIN: ICD-10-CM

## 2019-09-25 RX ORDER — METFORMIN HYDROCHLORIDE 500 MG/1
500 TABLET ORAL 2 TIMES DAILY WITH MEALS
Qty: 180 TABLET | Refills: 3 | Status: SHIPPED | OUTPATIENT
Start: 2019-09-25 | End: 2020-01-03 | Stop reason: SDUPTHER

## 2019-10-21 ENCOUNTER — TELEPHONE (OUTPATIENT)
Dept: ENDOSCOPY | Facility: HOSPITAL | Age: 74
End: 2019-10-21

## 2019-10-21 NOTE — TELEPHONE ENCOUNTER
10/21/19-pt. called back and had to cancel, his wife is in hospital, he will not have transpotation, patient verbally understood to contact endoscopy scheduling to reschedule procedure at [phone] -MH

## 2019-11-14 DIAGNOSIS — I10 HTN, GOAL BELOW 140/90: ICD-10-CM

## 2019-11-14 DIAGNOSIS — I10 ESSENTIAL HYPERTENSION: ICD-10-CM

## 2019-11-14 RX ORDER — AMLODIPINE BESYLATE 10 MG/1
10 TABLET ORAL DAILY
Qty: 90 TABLET | Refills: 1 | Status: SHIPPED | OUTPATIENT
Start: 2019-11-14 | End: 2020-02-04 | Stop reason: SDUPTHER

## 2019-11-14 RX ORDER — HYDROCHLOROTHIAZIDE 25 MG/1
25 TABLET ORAL DAILY
Qty: 90 TABLET | Refills: 1 | Status: SHIPPED | OUTPATIENT
Start: 2019-11-14 | End: 2020-02-04 | Stop reason: SDUPTHER

## 2019-11-14 RX ORDER — LOSARTAN POTASSIUM 25 MG/1
12.5 TABLET ORAL DAILY
Qty: 45 TABLET | Refills: 1 | Status: SHIPPED | OUTPATIENT
Start: 2019-11-14 | End: 2020-02-04 | Stop reason: SDUPTHER

## 2019-11-14 RX ORDER — HYDROCHLOROTHIAZIDE 25 MG/1
25 TABLET ORAL DAILY
Qty: 90 TABLET | Refills: 1 | Status: CANCELLED | OUTPATIENT
Start: 2019-11-14

## 2019-11-14 RX ORDER — LOSARTAN POTASSIUM 25 MG/1
12.5 TABLET ORAL DAILY
Qty: 45 TABLET | Refills: 1 | Status: CANCELLED | OUTPATIENT
Start: 2019-11-14 | End: 2020-11-13

## 2019-11-14 RX ORDER — AMLODIPINE BESYLATE 10 MG/1
10 TABLET ORAL DAILY
Qty: 90 TABLET | Refills: 1 | Status: CANCELLED | OUTPATIENT
Start: 2019-11-14

## 2019-11-14 NOTE — TELEPHONE ENCOUNTER
Pended meds don't match what patient appears to have requested please clarify  Already sent in losartan today

## 2019-11-15 NOTE — TELEPHONE ENCOUNTER
Initial note appears to be requesting amlodipine and HCTZ. These were not pended. Please clarify    His losartan was sent to [facility] yesterday. Does he want it sent somewhere else?

## 2020-01-03 DIAGNOSIS — E11.65 CONTROLLED TYPE 2 DIABETES MELLITUS WITH HYPERGLYCEMIA, WITHOUT LONG-TERM CURRENT USE OF INSULIN: ICD-10-CM

## 2020-01-03 RX ORDER — METFORMIN HYDROCHLORIDE 500 MG/1
500 TABLET ORAL 2 TIMES DAILY WITH MEALS
Qty: 180 TABLET | Refills: 1 | Status: SHIPPED | OUTPATIENT
Start: 2020-01-03 | End: 2020-04-16 | Stop reason: SDUPTHER

## 2020-02-04 DIAGNOSIS — I10 HTN, GOAL BELOW 140/90: ICD-10-CM

## 2020-02-04 DIAGNOSIS — I10 ESSENTIAL HYPERTENSION: ICD-10-CM

## 2020-02-05 DIAGNOSIS — I10 ESSENTIAL HYPERTENSION: ICD-10-CM

## 2020-02-05 DIAGNOSIS — I10 HTN, GOAL BELOW 140/90: ICD-10-CM

## 2020-02-06 RX ORDER — LOSARTAN POTASSIUM 25 MG/1
12.5 TABLET ORAL DAILY
Qty: 45 TABLET | Refills: 1 | Status: SHIPPED | OUTPATIENT
Start: 2020-02-06 | End: 2020-07-30 | Stop reason: SDUPTHER

## 2020-02-06 RX ORDER — HYDROCHLOROTHIAZIDE 25 MG/1
25 TABLET ORAL DAILY
Qty: 90 TABLET | Refills: 0 | Status: SHIPPED | OUTPATIENT
Start: 2020-02-06 | End: 2020-07-30 | Stop reason: SDUPTHER

## 2020-02-06 RX ORDER — AMLODIPINE BESYLATE 10 MG/1
10 TABLET ORAL DAILY
Qty: 90 TABLET | Refills: 2 | Status: SHIPPED | OUTPATIENT
Start: 2020-02-06 | End: 2020-07-30 | Stop reason: SDUPTHER

## 2020-02-06 NOTE — PROGRESS NOTES
Refill Authorization Note     is requesting a refill authorization.    Brief assessment and rationale for refill: APPROVE: prr          Medication Therapy Plan: FOVS/FLOS                              Comments:   Requested Prescriptions   Pending Prescriptions Disp Refills    losartan (COZAAR) 25 MG tablet 45 tablet 1     Sig: Take 0.5 tablets (12.5 mg total) by mouth once daily.       Cardiovascular:  Angiotensin Receptor Blockers Passed - 2/4/2020  9:30 AM        Passed - Patient is at least 18 years old        Passed - Last BP in normal range within 360 days.     BP Readings from Last 3 Encounters:   09/19/19 130/70   06/28/19 132/78   06/14/19 (!) 152/85              Passed - Office visit in past 12 months or future 90 days.     Recent Outpatient Visits            4 months ago Annual physical exam    [facility] - Internal Medicine Al Harvey MD    7 months ago Other specified abdominal hernia without obstruction or gangrene    [facility] Internal Medicine Shy Coyle PA-C    7 months ago Fatty liver    [facility] - Hepatology Genaro Berry PA-C    8 months ago Encounter for preventive health examination    [facility] Internal Medicine KIRK Mendiola    10 months ago Essential hypertension    [facility] Internal Medicine Al Harvey MD          Future Appointments              In 1 month LAB, [facility]    In 1 month Al Harvey MD [facility] Internal Medicine, [facility] PCW                Passed - Cr is 1.3 or below and within 360 days     Creatinine   Date Value Ref Range Status   05/13/2019 1.0 0.5 - 1.4 mg/dL Final   03/25/2019 1.2 0.5 - 1.4 mg/dL Final   03/25/2019 1.2 0.5 - 1.4 mg/dL Final              Passed - K in normal range and within 360 days     Potassium   Date Value Ref Range Status   05/13/2019 3.9 3.5 - 5.1 mmol/L Final   03/25/2019 4.2 3.5 - 5.1 mmol/L Final   03/25/2019 4.2 3.5 - 5.1 mmol/L  Final              Passed - eGFR within 360 days     eGFR if non    Date Value Ref Range Status   05/13/2019 >60 >60 mL/min/1.73 m^2 Final     Comment:     Calculation used to obtain the estimated glomerular filtration  rate (eGFR) is the CKD-EPI equation.      03/25/2019 >60 >60 mL/min/1.73 m^2 Final     Comment:     Calculation used to obtain the estimated glomerular filtration  rate (eGFR) is the CKD-EPI equation.      03/25/2019 >60 >60 mL/min/1.73 m^2 Final     Comment:     Calculation used to obtain the estimated glomerular filtration  rate (eGFR) is the CKD-EPI equation.        eGFR if    Date Value Ref Range Status   05/13/2019 >60 >60 mL/min/1.73 m^2 Final   03/25/2019 >60 >60 mL/min/1.73 m^2 Final   03/25/2019 >60 >60 mL/min/1.73 m^2 Final             amLODIPine (NORVASC) 10 MG tablet 90 tablet 2     Sig: Take 1 tablet (10 mg total) by mouth once daily. For blood pressure       Cardiovascular:  Calcium Channel Blockers Passed - 2/4/2020  9:30 AM        Passed - Patient is at least 18 years old        Passed - Last BP in normal range within 360 days.     BP Readings from Last 3 Encounters:   09/19/19 130/70   06/28/19 132/78   06/14/19 (!) 152/85              Passed - Office visit in past 12 months or future 90 days.     Recent Outpatient Visits            4 months ago Annual physical exam    [facility] - [facility] Al Harvey MD    7 months ago Other specified abdominal hernia without obstruction or gangrene    [facility] Shy Coyle PA-C    7 months ago Fatty liver    [facility] - Hepatology Genaro Berry PA-C    8 months ago Encounter for preventive health examination    [facility] KIRK Mendiola    10 months ago Essential hypertension    [facility] Al Harvey MD          Future Appointments              In 1 month LAB, SAME DAY [facility], [facility]  PCW    In 1 month MD [facility], [facility] ximena PCW               hydroCHLOROthiazide (HYDRODIURIL) 25 MG tablet 90 tablet 0     Sig: Take 1 tablet (25 mg total) by mouth once daily. For blood pressure       Cardiovascular: Diuretics - Thiazide Failed - 2/4/2020  9:30 AM        Failed - Cr is 1.3 or below and within 180 days     Creatinine   Date Value Ref Range Status   05/13/2019 1.0 0.5 - 1.4 mg/dL Final   03/25/2019 1.2 0.5 - 1.4 mg/dL Final   03/25/2019 1.2 0.5 - 1.4 mg/dL Final              Failed - K in normal range and within 180 days     Potassium   Date Value Ref Range Status   05/13/2019 3.9 3.5 - 5.1 mmol/L Final   03/25/2019 4.2 3.5 - 5.1 mmol/L Final   03/25/2019 4.2 3.5 - 5.1 mmol/L Final              Failed - Na is between 130 and 148 and within 180 days     Sodium   Date Value Ref Range Status   05/13/2019 141 136 - 145 mmol/L Final   03/25/2019 141 136 - 145 mmol/L Final   03/25/2019 141 136 - 145 mmol/L Final              Failed - eGFR within 180 days     eGFR if non    Date Value Ref Range Status   05/13/2019 >60 >60 mL/min/1.73 m^2 Final     Comment:     Calculation used to obtain the estimated glomerular filtration  rate (eGFR) is the CKD-EPI equation.      03/25/2019 >60 >60 mL/min/1.73 m^2 Final     Comment:     Calculation used to obtain the estimated glomerular filtration  rate (eGFR) is the CKD-EPI equation.      03/25/2019 >60 >60 mL/min/1.73 m^2 Final     Comment:     Calculation used to obtain the estimated glomerular filtration  rate (eGFR) is the CKD-EPI equation.        eGFR if    Date Value Ref Range Status   05/13/2019 >60 >60 mL/min/1.73 m^2 Final   03/25/2019 >60 >60 mL/min/1.73 m^2 Final   03/25/2019 >60 >60 mL/min/1.73 m^2 Final              Passed - Patient is at least 18 years old        Passed - Last BP in normal range within 360 days.     BP Readings from Last 3 Encounters:   09/19/19 130/70   06/28/19 132/78    06/14/19 (!) 152/85              Passed - Office visit in past 12 months or future 90 days.     Recent Outpatient Visits            4 months ago Annual physical exam    [facility] - Internal Medicine Al Harvey MD    7 months ago Other specified abdominal hernia without obstruction or gangrene    [facility] - Internal Medicine Shy Coyle PA-C    7 months ago Fatty liver    [facility] - Hepatology Genaro Berry PA-C    8 months ago Encounter for preventive health examination    [facility] - Internal Medicine KIRK Mendiola    10 months ago Essential hypertension    [facility] - Internal Medicine Al Harvey MD          Future Appointments              In 1 month LAB, SAME DAY [facility]    In 1 month MD [facility] - Internal Medicine, [facility] J[facility]E                Passed - Ca in normal range and within 360 days     Calcium   Date Value Ref Range Status   05/13/2019 10.2 8.7 - 10.5 mg/dL Final   03/25/2019 10.5 8.7 - 10.5 mg/dL Final   03/25/2019 10.5 8.7 - 10.5 mg/dL Final

## 2020-02-07 RX ORDER — LOSARTAN POTASSIUM 25 MG/1
12.5 TABLET ORAL DAILY
Qty: 45 TABLET | Refills: 1 | OUTPATIENT
Start: 2020-02-07 | End: 2021-02-06

## 2020-02-07 RX ORDER — AMLODIPINE BESYLATE 10 MG/1
10 TABLET ORAL DAILY
Qty: 90 TABLET | Refills: 1 | OUTPATIENT
Start: 2020-02-07

## 2020-02-07 RX ORDER — HYDROCHLOROTHIAZIDE 25 MG/1
25 TABLET ORAL DAILY
Qty: 90 TABLET | Refills: 1 | OUTPATIENT
Start: 2020-02-07

## 2020-02-10 ENCOUNTER — PATIENT MESSAGE (OUTPATIENT)
Dept: INTERNAL MEDICINE | Facility: CLINIC | Age: 75
End: 2020-02-10

## 2020-03-12 ENCOUNTER — LAB VISIT (OUTPATIENT)
Dept: LAB | Facility: HOSPITAL | Age: 75
End: 2020-03-12
Attending: INTERNAL MEDICINE
Payer: MEDICARE

## 2020-03-12 DIAGNOSIS — E78.5 HYPERLIPIDEMIA LDL GOAL <100: ICD-10-CM

## 2020-03-12 DIAGNOSIS — Z85.828 HISTORY OF BASAL CELL CARCINOMA: ICD-10-CM

## 2020-03-12 DIAGNOSIS — Z00.00 ANNUAL PHYSICAL EXAM: ICD-10-CM

## 2020-03-12 DIAGNOSIS — K76.0 FATTY LIVER: ICD-10-CM

## 2020-03-12 DIAGNOSIS — R73.01 IMPAIRED FASTING BLOOD SUGAR: ICD-10-CM

## 2020-03-12 DIAGNOSIS — R73.03 BORDERLINE DIABETES MELLITUS: ICD-10-CM

## 2020-03-12 DIAGNOSIS — I10 ESSENTIAL HYPERTENSION: ICD-10-CM

## 2020-03-12 DIAGNOSIS — E79.0 HYPERURICEMIA: ICD-10-CM

## 2020-03-12 LAB
25(OH)D3+25(OH)D2 SERPL-MCNC: 30 NG/ML (ref 30–96)
ALBUMIN SERPL BCP-MCNC: 4.2 G/DL (ref 3.5–5.2)
ALP SERPL-CCNC: 51 U/L (ref 55–135)
ALT SERPL W/O P-5'-P-CCNC: 41 U/L (ref 10–44)
ANION GAP SERPL CALC-SCNC: 8 MMOL/L (ref 8–16)
AST SERPL-CCNC: 28 U/L (ref 10–40)
BASOPHILS # BLD AUTO: 0.04 K/UL (ref 0–0.2)
BASOPHILS NFR BLD: 0.6 % (ref 0–1.9)
BILIRUB SERPL-MCNC: 0.5 MG/DL (ref 0.1–1)
BUN SERPL-MCNC: 14 MG/DL (ref 8–23)
CALCIUM SERPL-MCNC: 10 MG/DL (ref 8.7–10.5)
CHLORIDE SERPL-SCNC: 101 MMOL/L (ref 95–110)
CHOLEST SERPL-MCNC: 181 MG/DL (ref 120–199)
CHOLEST/HDLC SERPL: 3.9 {RATIO} (ref 2–5)
CO2 SERPL-SCNC: 32 MMOL/L (ref 23–29)
CREAT SERPL-MCNC: 1.1 MG/DL (ref 0.5–1.4)
DIFFERENTIAL METHOD: NORMAL
EOSINOPHIL # BLD AUTO: 0.2 K/UL (ref 0–0.5)
EOSINOPHIL NFR BLD: 3.3 % (ref 0–8)
ERYTHROCYTE [DISTWIDTH] IN BLOOD BY AUTOMATED COUNT: 13.3 % (ref 11.5–14.5)
EST. GFR  (AFRICAN AMERICAN): >60 ML/MIN/1.73 M^2
EST. GFR  (NON AFRICAN AMERICAN): >60 ML/MIN/1.73 M^2
ESTIMATED AVG GLUCOSE: 128 MG/DL (ref 68–131)
GLUCOSE SERPL-MCNC: 148 MG/DL (ref 70–110)
HBA1C MFR BLD HPLC: 6.1 % (ref 4–5.6)
HCT VFR BLD AUTO: 45.9 % (ref 40–54)
HDLC SERPL-MCNC: 47 MG/DL (ref 40–75)
HDLC SERPL: 26 % (ref 20–50)
HGB BLD-MCNC: 14.7 G/DL (ref 14–18)
IMM GRANULOCYTES # BLD AUTO: 0.02 K/UL (ref 0–0.04)
IMM GRANULOCYTES NFR BLD AUTO: 0.3 % (ref 0–0.5)
LDLC SERPL CALC-MCNC: 107.2 MG/DL (ref 63–159)
LYMPHOCYTES # BLD AUTO: 1.9 K/UL (ref 1–4.8)
LYMPHOCYTES NFR BLD: 27.6 % (ref 18–48)
MCH RBC QN AUTO: 28.2 PG (ref 27–31)
MCHC RBC AUTO-ENTMCNC: 32 G/DL (ref 32–36)
MCV RBC AUTO: 88 FL (ref 82–98)
MONOCYTES # BLD AUTO: 0.6 K/UL (ref 0.3–1)
MONOCYTES NFR BLD: 9.1 % (ref 4–15)
NEUTROPHILS # BLD AUTO: 4.1 K/UL (ref 1.8–7.7)
NEUTROPHILS NFR BLD: 59.1 % (ref 38–73)
NONHDLC SERPL-MCNC: 134 MG/DL
NRBC BLD-RTO: 0 /100 WBC
PLATELET # BLD AUTO: 212 K/UL (ref 150–350)
PMV BLD AUTO: 12 FL (ref 9.2–12.9)
POTASSIUM SERPL-SCNC: 4.4 MMOL/L (ref 3.5–5.1)
PROT SERPL-MCNC: 7.8 G/DL (ref 6–8.4)
RBC # BLD AUTO: 5.21 M/UL (ref 4.6–6.2)
SODIUM SERPL-SCNC: 141 MMOL/L (ref 136–145)
TRIGL SERPL-MCNC: 134 MG/DL (ref 30–150)
URATE SERPL-MCNC: 7.1 MG/DL (ref 3.4–7)
WBC # BLD AUTO: 6.89 K/UL (ref 3.9–12.7)

## 2020-03-12 PROCEDURE — 83036 HEMOGLOBIN GLYCOSYLATED A1C: CPT | Mod: HCNC

## 2020-03-12 PROCEDURE — 82306 VITAMIN D 25 HYDROXY: CPT | Mod: HCNC

## 2020-03-12 PROCEDURE — 80053 COMPREHEN METABOLIC PANEL: CPT | Mod: HCNC

## 2020-03-12 PROCEDURE — 84550 ASSAY OF BLOOD/URIC ACID: CPT | Mod: HCNC

## 2020-03-12 PROCEDURE — 80061 LIPID PANEL: CPT | Mod: HCNC

## 2020-03-12 PROCEDURE — 85025 COMPLETE CBC W/AUTO DIFF WBC: CPT | Mod: HCNC

## 2020-03-12 PROCEDURE — 36415 COLL VENOUS BLD VENIPUNCTURE: CPT | Mod: HCNC

## 2020-03-17 ENCOUNTER — TELEPHONE (OUTPATIENT)
Dept: INTERNAL MEDICINE | Facility: CLINIC | Age: 75
End: 2020-03-17

## 2020-03-17 ENCOUNTER — PATIENT MESSAGE (OUTPATIENT)
Dept: INTERNAL MEDICINE | Facility: CLINIC | Age: 75
End: 2020-03-17

## 2020-03-18 NOTE — TELEPHONE ENCOUNTER
Patient is scheduled for routine follow up and lab review 3/19    Please contact patient -- if no urgent concerns, then I would recommend that we try to switch this to a virtual visit if possible, or otherwise postpone the visit for at least several weeks due to the Covid19 concerns.     Please give patient [facility] tech support number (Patient Tech Support [phone]) as they may need assistance getting their account active and ready to do virtual visits.

## 2020-03-18 NOTE — TELEPHONE ENCOUNTER
Spoke with pt, pt wanting to know will it be a good idea for him to come in for his 6 month follow up. I spoke with pt, I asked the pt is he having any major concerns where he need to come in and see the doctor. Pt stated he is fine and doing well. I offered him to do the virtual visit or to just reschedule his appt. Pt decided to reschedule his appt for may.

## 2020-04-16 DIAGNOSIS — E11.65 CONTROLLED TYPE 2 DIABETES MELLITUS WITH HYPERGLYCEMIA, WITHOUT LONG-TERM CURRENT USE OF INSULIN: ICD-10-CM

## 2020-04-16 RX ORDER — METFORMIN HYDROCHLORIDE 500 MG/1
500 TABLET ORAL 2 TIMES DAILY WITH MEALS
Qty: 180 TABLET | Refills: 1 | Status: CANCELLED | OUTPATIENT
Start: 2020-04-16

## 2020-04-21 RX ORDER — METFORMIN HYDROCHLORIDE 500 MG/1
500 TABLET ORAL 2 TIMES DAILY WITH MEALS
Qty: 180 TABLET | Refills: 1 | Status: SHIPPED | OUTPATIENT
Start: 2020-04-21 | End: 2020-09-03 | Stop reason: SDUPTHER

## 2020-04-21 NOTE — PROGRESS NOTES
Refill Authorization Note     is requesting a refill authorization.    Brief assessment and rationale for refill: APPROVE: prr                                         Comments:     Requested Prescriptions   Signed Prescriptions Disp Refills    metFORMIN (GLUCOPHAGE) 500 MG tablet 180 tablet 1     Sig: Take 1 tablet (500 mg total) by mouth 2 (two) times daily with meals.       Endocrinology:  Diabetes - Biguanides Passed - 4/21/2020  1:27 PM        Passed - Patient is at least 18 years old        Passed - Office visit in past 12 months or future 90 days.     Recent Outpatient Visits            7 months ago Annual physical exam    [facility] Al Harvey MD    9 months ago Other specified abdominal hernia without obstruction or gangrene    [facility] Shy Coyle PA-C    10 months ago Fatty liver    [facility] - Hepatology Genaro Berry PA-C    11 months ago Encounter for preventive health examination    [facility] KIRK Mendiola    1 year ago Essential hypertension    [facility] Al Harvey MD          Future Appointments              In 2 weeks Al Harvey MD [facility], [facility]                Passed - Cr is 1.3 or below and within 360 days     Creatinine   Date Value Ref Range Status   03/12/2020 1.1 0.5 - 1.4 mg/dL Final   05/13/2019 1.0 0.5 - 1.4 mg/dL Final   03/25/2019 1.2 0.5 - 1.4 mg/dL Final   03/25/2019 1.2 0.5 - 1.4 mg/dL Final              Passed - HBA1C is 7.9 or below and within 180 days     Hemoglobin A1C   Date Value Ref Range Status   03/12/2020 6.1 (H) 4.0 - 5.6 % Final     Comment:     ADA Screening Guidelines:  5.7-6.4%  Consistent with prediabetes  >or=6.5%  Consistent with diabetes  High levels of fetal hemoglobin interfere with the HbA1C  assay. Heterozygous hemoglobin variants (HbS, HgC, etc)do  not significantly interfere with this assay.   However,  presence of multiple variants may affect accuracy.     05/13/2019 5.9 (H) 4.0 - 5.6 % Final     Comment:     ADA Screening Guidelines:  5.7-6.4%  Consistent with prediabetes  >or=6.5%  Consistent with diabetes  High levels of fetal hemoglobin interfere with the HbA1C  assay. Heterozygous hemoglobin variants (HbS, HgC, etc)do  not significantly interfere with this assay.   However, presence of multiple variants may affect accuracy.     06/25/2018 5.9 (H) 4.0 - 5.6 % Final     Comment:     ADA Screening Guidelines:  5.7-6.4%  Consistent with prediabetes  >or=6.5%  Consistent with diabetes  High levels of fetal hemoglobin interfere with the HbA1C  assay. Heterozygous hemoglobin variants (HbS, HgC, etc)do  not significantly interfere with this assay.   However, presence of multiple variants may affect accuracy.                Passed - eGFR is 30 or above and within 360 days     eGFR if non    Date Value Ref Range Status   03/12/2020 >60.0 >60 mL/min/1.73 m^2 Final     Comment:     Calculation used to obtain the estimated glomerular filtration  rate (eGFR) is the CKD-EPI equation.      05/13/2019 >60 >60 mL/min/1.73 m^2 Final     Comment:     Calculation used to obtain the estimated glomerular filtration  rate (eGFR) is the CKD-EPI equation.      03/25/2019 >60 >60 mL/min/1.73 m^2 Final     Comment:     Calculation used to obtain the estimated glomerular filtration  rate (eGFR) is the CKD-EPI equation.      03/25/2019 >60 >60 mL/min/1.73 m^2 Final     Comment:     Calculation used to obtain the estimated glomerular filtration  rate (eGFR) is the CKD-EPI equation.        eGFR if    Date Value Ref Range Status   03/12/2020 >60.0 >60 mL/min/1.73 m^2 Final   05/13/2019 >60 >60 mL/min/1.73 m^2 Final   03/25/2019 >60 >60 mL/min/1.73 m^2 Final   03/25/2019 >60 >60 mL/min/1.73 m^2 Final               Appointments  past 12m or future 3m with PCP    Date Provider   Last Visit   9/19/2019 Al WYNN  MD Candice   Next Visit   5/8/2020 Al Harvey MD   .  ED visits in past 90 days: 0       Note composed:1:29 PM 04/21/2020

## 2020-06-16 ENCOUNTER — TELEPHONE (OUTPATIENT)
Dept: INTERNAL MEDICINE | Facility: CLINIC | Age: 75
End: 2020-06-16

## 2020-06-16 NOTE — TELEPHONE ENCOUNTER
----- Message from Jess Madrid sent at 6/16/2020  1:52 PM CDT -----  Regarding: LARS AVELAR P  [phone]   Caller is requesting an earlier appt than we can schedule.  Caller declined first available appointment listed below. Caller will not accept being placed on the wait list and is requesting a message be sent to the provider.  When is the next available appointment:  10/01/2020  Did you offer to schedule the next available appt and put the patient on the wait list?:  yes   What visit type: Ep   Symptoms:  6 mo f/u / labs shows sugar level a little high  Patient preference of timeframe to be scheduled:  June or July   What is the reason the patient is requesting a sooner appointment? (insurance terminating, changing jobs):  Sugar levels  test show elevated  Would the patient rather a call back or a response via [facility]?: Phone   Comments:

## 2020-06-19 ENCOUNTER — PATIENT OUTREACH (OUTPATIENT)
Dept: ADMINISTRATIVE | Facility: OTHER | Age: 75
End: 2020-06-19

## 2020-06-22 ENCOUNTER — PROCEDURE VISIT (OUTPATIENT)
Dept: HEPATOLOGY | Facility: CLINIC | Age: 75
End: 2020-06-22
Payer: MEDICARE

## 2020-06-22 ENCOUNTER — PATIENT MESSAGE (OUTPATIENT)
Dept: INTERNAL MEDICINE | Facility: CLINIC | Age: 75
End: 2020-06-22

## 2020-06-22 ENCOUNTER — OFFICE VISIT (OUTPATIENT)
Dept: HEPATOLOGY | Facility: CLINIC | Age: 75
End: 2020-06-22
Payer: MEDICARE

## 2020-06-22 VITALS
DIASTOLIC BLOOD PRESSURE: 78 MMHG | HEART RATE: 73 BPM | BODY MASS INDEX: 29.16 KG/M2 | WEIGHT: 220 LBS | RESPIRATION RATE: 18 BRPM | HEIGHT: 73 IN | TEMPERATURE: 97 F | OXYGEN SATURATION: 97 % | SYSTOLIC BLOOD PRESSURE: 143 MMHG

## 2020-06-22 DIAGNOSIS — K76.0 FATTY LIVER: Primary | ICD-10-CM

## 2020-06-22 DIAGNOSIS — H11.89 CONJUNCTIVAL IRRITATION: ICD-10-CM

## 2020-06-22 DIAGNOSIS — R74.8 ELEVATED LIVER ENZYMES: ICD-10-CM

## 2020-06-22 PROCEDURE — 3077F PR MOST RECENT SYSTOLIC BLOOD PRESSURE >= 140 MM HG: ICD-10-PCS | Mod: HCNC,CPTII,S$GLB, | Performed by: NURSE PRACTITIONER

## 2020-06-22 PROCEDURE — 3077F SYST BP >= 140 MM HG: CPT | Mod: HCNC,CPTII,S$GLB, | Performed by: NURSE PRACTITIONER

## 2020-06-22 PROCEDURE — 99499 RISK ADDL DX/OHS AUDIT: ICD-10-PCS | Mod: HCNC,S$GLB,, | Performed by: NURSE PRACTITIONER

## 2020-06-22 PROCEDURE — 99499 UNLISTED E&M SERVICE: CPT | Mod: ,,, | Performed by: NURSE PRACTITIONER

## 2020-06-22 PROCEDURE — 3078F DIAST BP <80 MM HG: CPT | Mod: HCNC,CPTII,S$GLB, | Performed by: NURSE PRACTITIONER

## 2020-06-22 PROCEDURE — 99499 UNLISTED E&M SERVICE: CPT | Mod: HCNC,S$GLB,, | Performed by: NURSE PRACTITIONER

## 2020-06-22 PROCEDURE — 99214 PR OFFICE/OUTPT VISIT, EST, LEVL IV, 30-39 MIN: ICD-10-PCS | Mod: HCNC,S$GLB,, | Performed by: NURSE PRACTITIONER

## 2020-06-22 PROCEDURE — 1101F PT FALLS ASSESS-DOCD LE1/YR: CPT | Mod: HCNC,CPTII,S$GLB, | Performed by: NURSE PRACTITIONER

## 2020-06-22 PROCEDURE — 99999 PR PBB SHADOW E&M-EST. PATIENT-LVL V: CPT | Mod: PBBFAC,HCNC,, | Performed by: NURSE PRACTITIONER

## 2020-06-22 PROCEDURE — 99214 OFFICE O/P EST MOD 30 MIN: CPT | Mod: HCNC,S$GLB,, | Performed by: NURSE PRACTITIONER

## 2020-06-22 PROCEDURE — 91200 LIVER ELASTOGRAPHY: CPT | Mod: HCNC,S$GLB,, | Performed by: NURSE PRACTITIONER

## 2020-06-22 PROCEDURE — 1159F PR MEDICATION LIST DOCUMENTED IN MEDICAL RECORD: ICD-10-PCS | Mod: HCNC,S$GLB,, | Performed by: NURSE PRACTITIONER

## 2020-06-22 PROCEDURE — 1101F PR PT FALLS ASSESS DOC 0-1 FALLS W/OUT INJ PAST YR: ICD-10-PCS | Mod: HCNC,CPTII,S$GLB, | Performed by: NURSE PRACTITIONER

## 2020-06-22 PROCEDURE — 1159F MED LIST DOCD IN RCRD: CPT | Mod: HCNC,S$GLB,, | Performed by: NURSE PRACTITIONER

## 2020-06-22 PROCEDURE — 1126F PR PAIN SEVERITY QUANTIFIED, NO PAIN PRESENT: ICD-10-PCS | Mod: HCNC,S$GLB,, | Performed by: NURSE PRACTITIONER

## 2020-06-22 PROCEDURE — 91200 PR LIVER ELASTOGRAPHY W/OUT IMAG W/INTERP & REPORT: ICD-10-PCS | Mod: HCNC,S$GLB,, | Performed by: NURSE PRACTITIONER

## 2020-06-22 PROCEDURE — 1126F AMNT PAIN NOTED NONE PRSNT: CPT | Mod: HCNC,S$GLB,, | Performed by: NURSE PRACTITIONER

## 2020-06-22 PROCEDURE — 99999 PR PBB SHADOW E&M-EST. PATIENT-LVL V: ICD-10-PCS | Mod: PBBFAC,HCNC,, | Performed by: NURSE PRACTITIONER

## 2020-06-22 PROCEDURE — 3078F PR MOST RECENT DIASTOLIC BLOOD PRESSURE < 80 MM HG: ICD-10-PCS | Mod: HCNC,CPTII,S$GLB, | Performed by: NURSE PRACTITIONER

## 2020-06-22 NOTE — PATIENT INSTRUCTIONS
1. Your recent labs show slight improvement in ALT (enzyme that measures inflammation in the liver).   2. Fibroscan today in clinic was consistent with stage 2 fibrosis and significant hepatic steatosis (fat in the liver).   3. Continue to work on dietary changes, weight loss and exercise.   4. Aim for 30-45 minutes of aerobic exercise (walking, running, cycling) most days of the week.   5. Recommend low carb, high protein diet. Limit the amount of carbs in each meal to 30-45 grams.  6. Recommend a 20 pound weight loss.   7. Recommend to continue to avoid alcohol.   8. Return to clinic for a follow up visit with me in 1 year.

## 2020-06-22 NOTE — PROGRESS NOTES
HEPATOLOGY CLINIC VISIT NOTE     REFERRING PROVIDER: Dr. Al Harvey    REASON FOR VISIT: Fatty Liver, Elevated liver enzymes     HISTORY: Mr. Mueller is a 74 y.o. White male here for follow up in the management of fatty liver. He was last seen in clinic by Genaro Berry PA-C in 6/2019. He has a history of elevated transaminases in a hepatocellular pattern since 2/2016. Most recent labs in March show improvement with an AST 28, ALT 41. He has normal synthetic liver function. Serologic work up for other causes of chronic liver disease was negative. He denies history of drug use or heavy alcohol intake. He denies FH of liver disease. His risk factors for the development of fatty liver disease include HTN, HLD, and prediabetes (on Metformin 500 mg BID, last A1c was 6.1% in March). He had an USN in 1/2017 that showed hepatic steatosis. Fibroscan in 2018 was suggestive of F1-F2 fibrosis and significant hepatic steatosis. Repeat Fibroscan today was consistent with F2 fibrosis and significant hepatic steatosis. BMI is 29. He has lost 9 pounds over the past year, through a combination of dietary changes and increased exercise.   Today in clinic he is well appearing and has no acute complaints. He denies any jaundice, dark urine, abdominal distention, hematemesis, melena, or slowed mentation.     Past Medical History:   Diagnosis Date    Basal cell carcinoma 02/2015    R eyelid    Borderline diabetes 2016    [facility]    Elevated blood uric acid level 2016    Fatty liver     Hypertension     diagnosed 2005    Pancreatitis 07/2015    etiology unclear     Past Surgical History:   Procedure Laterality Date    CHOLECYSTECTOMY  2012    COLONOSCOPY      x2    SKIN CANCER EXCISION       FAMILY HISTORY: Negative for liver disease    SOCIAL HISTORY:   Social History     Tobacco Use   Smoking Status Never Smoker   Smokeless Tobacco Never Used     Social  "History     Substance and Sexual Activity   Alcohol Use Not Currently     Social History     Substance and Sexual Activity   Drug Use No     ROS:   No fever, chills, weight loss  No chest pain, dyspnea, cough  No abdominal pain, nausea, vomiting  No headaches, visual changes  No lower extremity edema  No depression or anxiety    PHYSICAL EXAM:  Friendly White male, in no acute distress; alert and oriented to person, place and time  VITALS: BP (!) 143/78 (BP Location: Left arm, Patient Position: Sitting, BP Method: Medium (Automatic))   Pulse 73   Temp 97 °F (36.1 °C) (Oral)   Resp 18   Ht 6' 1" (1.854 m)   Wt 99.8 kg (220 lb)   SpO2 97%   BMI 29.03 kg/m²   HEENT: Sclerae anicteric.   NECK: Supple  LUNGS: Normal respiratory effort. CTA bilaterally.   ABDOMEN: Flat, soft, nontender. + BS in all 4 quadrants.   SKIN: Warm and dry. No jaundice, No obvious rashes.   EXTREMITIES: No lower extremity edema.  NEURO/PSYCH: Normal gate. Memory intact. Thought and speech pattern appropriate. Behavior normal. No depression or anxiety noted.    RECENT LABS:  Lab Results   Component Value Date    WBC 6.89 03/12/2020    HGB 14.7 03/12/2020     03/12/2020     Lab Results   Component Value Date    INR 1.0 06/26/2018     Lab Results   Component Value Date    AST 28 03/12/2020    ALT 41 03/12/2020    BILITOT 0.5 03/12/2020    ALBUMIN 4.2 03/12/2020    ALKPHOS 51 (L) 03/12/2020    CREATININE 1.1 03/12/2020    BUN 14 03/12/2020     03/12/2020    K 4.4 03/12/2020     RECENT IMAGING:    USN Abdomen Limited 1/23/2017:     Results: The liver is enlarged in size measuring 18.8 cm.  It demonstrates increased echogenicity throughout and attenuates the sound consistent with fatty liver infiltration.The common duct measures 4 mm , which is normal.  The gallbladder has been removed.  The pancreas is obscured by overlying intestinal gas.  The right kidney measures 11.9 x 6.6 x 6.0  cm.  There is a small cyst in the lower pole " measuring 1.6 cm.  No ascites.  IMPRESSION:         1. Cholecystectomy.  2.  Mild hepatomegaly and fatty liver infiltration.  3.  Small right kidney cyst.     Fibroscan 7/10/2018:    Fibroscan readin.1 KPa     Fibrosis:F1-F2      CAP readin dB/m     Steatosis: :S3      Fibroscan 2020:    Findings  Median liver stiffness score:  8.5  CAP Reading: dB/m:  341     IQR/med %:  16  Interpretation  Fibrosis interpretation is based on medial liver stiffness - Kilopascal (kPa).     Fibrosis Stage:  F2  Steatosis interpretation is based on controlled attenuation parameter - (dB/m).     Steatosis Grade:  S3     ASSESSMENT  74 y.o. White male with:  1. FATTY LIVER DISEASE/ELEVATED LIVER ENZYMES:  -- Fibroscan today was suggestive of F2 and significant steatosis.  -- Prior serological work up negative for AIH, PBC, viral hepatitis, wilsons, HH.  -- Risk factors for the development of fatty liver disease include prediabetes, HTN, and HLD.  -- Discussed diet, exercise and weight loss recommendations in depth at appointment today, as below:    EDUCATION:  We discussed the manifestations of NAFLD. At this time there is no FDA approved therapy for NAFLD.   The patient and I discussed the importance of diet, exercise, and weight loss for management of NAFLD. We discussed a low fat, low carb/low sugar, high fiber diet, and a goal of 30 minutes of exercise 5 times per week.   Pt is aware that fatty liver can progress to steatohepatitis and possibly to cirrhosis, putting one at increased risk for liver cancer, liver failure, and death.        PLAN:    1. Repeat HFP in 6 months to monitor liver function.  2. Fibroscan today in clinic to monitor liver fibrosis/steatosis.  3. Continue to work on dietary changes, weight loss and exercise.   4. Aim for 30-45 minutes of aerobic exercise (walking, running, cycling) most days of the week.   5. Recommend low carb, high protein diet. Limit the amount of carbs in each meal to 30-45  grams.  6. Recommend a 20 pound weight loss.   7. Recommend to continue to avoid alcohol.   8. Return to clinic for a follow up visit with me in 1 year.     Thank you for allowing me to participate in the care of Ezekiel Mueller       Hepatology Nurse Practitioner  [facility]  6/22/2020 @ 1140

## 2020-06-22 NOTE — PROCEDURES
FibroScan (Vibration Controlled Transient Elastography)    Date/Time: 6/22/2020 10:15 AM  Performed by: Crissy Rubin NP  Authorized by: Crissy Rubin NP     Diagnosis:  NAFLD    Probe:  M    Universal Protocol: Patient's identity, procedure and site were verified, confirmatory pause was performed.  Discussed procedure including risks and potential complications.  Questions answered.  Patient verbalizes understanding and wishes to proceed with VCTE.     Procedure: After providing explanations of the procedure, patient was placed in the supine position with right arm in maximum abduction to allow optimal exposure of right lateral abdomen.  Patient was briefly assessed, Testing was performed in the mid-axillary location, 50Hz Shear Wave pulses were applied and the resulting Shear Wave and Propagation Speed detected with a 3.5 MHz ultrasonic signal, using the FibroScan probe, Skin to liver capsule distance and liver parenchyma were accessed during the entire examination with the FibroScan probe, Patient was instructed to breathe normally and to abstain from sudden movements during the procedure, allowing for random measurements of liver stiffness. At least 10 Shear Waves were produced, Individual measurements of each Shear Wave were calculated.  Patient tolerated the procedure well with no complications.  Meets discharge criteria as was dismissed.  Rates pain 0 out of 10.  Patient will follow up with ordering provider to review results.      Findings  Median liver stiffness score:  8.5  CAP Reading: dB/m:  341    IQR/med %:  16  Interpretation  Fibrosis interpretation is based on medial liver stiffness - Kilopascal (kPa).    Fibrosis Stage:  F2  Steatosis interpretation is based on controlled attenuation parameter - (dB/m).    Steatosis Grade:  S3

## 2020-06-23 NOTE — TELEPHONE ENCOUNTER
Spoke with pt, pt stated that Dr.Sahibu Leija prescribe him erythromycin ophthalmic ointment for his eyes and he is out. I ask pt to give Dr. Johnny Leija office a call to see if they are able to refill the ointment, but pt wanting to know can Dr. Harvey send in the same medication or can she send in something different for him for his eyes.

## 2020-06-24 ENCOUNTER — PATIENT MESSAGE (OUTPATIENT)
Dept: INTERNAL MEDICINE | Facility: CLINIC | Age: 75
End: 2020-06-24

## 2020-06-24 DIAGNOSIS — E78.5 HYPERLIPIDEMIA LDL GOAL <100: ICD-10-CM

## 2020-06-24 DIAGNOSIS — H11.89 CONJUNCTIVAL IRRITATION: ICD-10-CM

## 2020-06-24 RX ORDER — ERYTHROMYCIN 5 MG/G
OINTMENT OPHTHALMIC NIGHTLY
Qty: 3.5 G | Refills: 0 | Status: SHIPPED | OUTPATIENT
Start: 2020-06-24 | End: 2020-09-03 | Stop reason: SDUPTHER

## 2020-06-24 RX ORDER — ERYTHROMYCIN 5 MG/G
OINTMENT OPHTHALMIC NIGHTLY
Qty: 3.5 G | Refills: 1 | Status: CANCELLED | OUTPATIENT
Start: 2020-06-24

## 2020-06-25 RX ORDER — ATORVASTATIN CALCIUM 20 MG/1
20 TABLET, FILM COATED ORAL DAILY
Qty: 90 TABLET | Refills: 3 | Status: SHIPPED | OUTPATIENT
Start: 2020-06-25 | End: 2021-05-21

## 2020-06-25 NOTE — TELEPHONE ENCOUNTER
Patient with h/o corneal scarring, ptosis after surgery with recurrent conjunctival irritation and infection on L eye - not able to keep eye closed well at night especially  Has some whitish discharge and sticking   Using Systane artificial tears but this has been insufficient  Recently using warm compresses which has helped some but still having pain and irritation, some discharge.  Doesn't use an artificial-tears gel or ointment, just drops.    Will send in new rx for erythromycin ointment as this has been consistently helpful in the past, but recommended that he not use this nightly on an ongoing basis unless specifically recommended by an eye doctor. Continue warm compresses, see if possible to find a mask or patch that helps keep eye closed at night, and try artificial tears gel or ointment at night, every night.    He will also call to schedule f/u appt with optometry.

## 2020-08-04 DIAGNOSIS — I10 HTN, GOAL BELOW 140/90: ICD-10-CM

## 2020-08-04 DIAGNOSIS — I10 ESSENTIAL HYPERTENSION: ICD-10-CM

## 2020-08-04 RX ORDER — LOSARTAN POTASSIUM 25 MG/1
12.5 TABLET ORAL DAILY
Qty: 45 TABLET | Refills: 1 | Status: CANCELLED | OUTPATIENT
Start: 2020-08-04 | End: 2021-08-04

## 2020-08-04 RX ORDER — HYDROCHLOROTHIAZIDE 25 MG/1
25 TABLET ORAL DAILY
Qty: 90 TABLET | Refills: 0 | Status: CANCELLED | OUTPATIENT
Start: 2020-08-04

## 2020-08-04 RX ORDER — AMLODIPINE BESYLATE 10 MG/1
10 TABLET ORAL DAILY
Qty: 90 TABLET | Refills: 2 | Status: CANCELLED | OUTPATIENT
Start: 2020-08-04

## 2020-08-04 NOTE — TELEPHONE ENCOUNTER
No new care gaps identified.  Powered by [employer]. Reference number: [id number]. 8/04/2020 5:53:28 AM CDT

## 2020-08-20 ENCOUNTER — PATIENT OUTREACH (OUTPATIENT)
Dept: ADMINISTRATIVE | Facility: HOSPITAL | Age: 75
End: 2020-08-20

## 2020-09-03 ENCOUNTER — OFFICE VISIT (OUTPATIENT)
Dept: INTERNAL MEDICINE | Facility: CLINIC | Age: 75
End: 2020-09-03
Payer: MEDICARE

## 2020-09-03 VITALS
OXYGEN SATURATION: 99 % | WEIGHT: 218.25 LBS | HEART RATE: 72 BPM | HEIGHT: 73 IN | SYSTOLIC BLOOD PRESSURE: 134 MMHG | DIASTOLIC BLOOD PRESSURE: 82 MMHG | RESPIRATION RATE: 14 BRPM | BODY MASS INDEX: 28.93 KG/M2

## 2020-09-03 DIAGNOSIS — I10 ESSENTIAL HYPERTENSION: ICD-10-CM

## 2020-09-03 DIAGNOSIS — H11.89 CONJUNCTIVAL IRRITATION: ICD-10-CM

## 2020-09-03 DIAGNOSIS — Z12.11 ENCOUNTER FOR SCREENING FOR COLORECTAL MALIGNANT NEOPLASM: ICD-10-CM

## 2020-09-03 DIAGNOSIS — R73.03 BORDERLINE DIABETES MELLITUS: ICD-10-CM

## 2020-09-03 DIAGNOSIS — Z00.00 ANNUAL PHYSICAL EXAM: Primary | ICD-10-CM

## 2020-09-03 DIAGNOSIS — E78.5 HYPERLIPIDEMIA LDL GOAL <100: ICD-10-CM

## 2020-09-03 DIAGNOSIS — I10 HTN, GOAL BELOW 140/90: ICD-10-CM

## 2020-09-03 DIAGNOSIS — M62.830 BACK MUSCLE SPASM: ICD-10-CM

## 2020-09-03 DIAGNOSIS — R73.01 IMPAIRED FASTING BLOOD SUGAR: ICD-10-CM

## 2020-09-03 DIAGNOSIS — Z85.828 HISTORY OF BASAL CELL CARCINOMA: ICD-10-CM

## 2020-09-03 DIAGNOSIS — Z23 NEED FOR VACCINATION AGAINST STREPTOCOCCUS PNEUMONIAE USING PNEUMOCOCCAL CONJUGATE VACCINE 13: ICD-10-CM

## 2020-09-03 DIAGNOSIS — Z12.5 ENCOUNTER FOR SCREENING FOR MALIGNANT NEOPLASM OF PROSTATE: ICD-10-CM

## 2020-09-03 DIAGNOSIS — Z12.12 ENCOUNTER FOR SCREENING FOR COLORECTAL MALIGNANT NEOPLASM: ICD-10-CM

## 2020-09-03 DIAGNOSIS — E11.65 CONTROLLED TYPE 2 DIABETES MELLITUS WITH HYPERGLYCEMIA, WITHOUT LONG-TERM CURRENT USE OF INSULIN: ICD-10-CM

## 2020-09-03 PROCEDURE — 1125F AMNT PAIN NOTED PAIN PRSNT: CPT | Mod: S$GLB,,, | Performed by: INTERNAL MEDICINE

## 2020-09-03 PROCEDURE — 99999 PR PBB SHADOW E&M-EST. PATIENT-LVL IV: ICD-10-PCS | Mod: PBBFAC,,, | Performed by: INTERNAL MEDICINE

## 2020-09-03 PROCEDURE — 99499 UNLISTED E&M SERVICE: CPT | Mod: HCNC,S$GLB,, | Performed by: INTERNAL MEDICINE

## 2020-09-03 PROCEDURE — 3079F DIAST BP 80-89 MM HG: CPT | Mod: CPTII,S$GLB,, | Performed by: INTERNAL MEDICINE

## 2020-09-03 PROCEDURE — 90670 PNEUMOCOCCAL CONJUGATE VACCINE 13-VALENT LESS THAN 5YO & GREATER THAN: ICD-10-PCS | Mod: S$GLB,,, | Performed by: INTERNAL MEDICINE

## 2020-09-03 PROCEDURE — 3075F SYST BP GE 130 - 139MM HG: CPT | Mod: CPTII,S$GLB,, | Performed by: INTERNAL MEDICINE

## 2020-09-03 PROCEDURE — 99499 RISK ADDL DX/OHS AUDIT: ICD-10-PCS | Mod: HCNC,S$GLB,, | Performed by: INTERNAL MEDICINE

## 2020-09-03 PROCEDURE — 99397 PR PREVENTIVE VISIT,EST,65 & OVER: ICD-10-PCS | Mod: 25,S$GLB,, | Performed by: INTERNAL MEDICINE

## 2020-09-03 PROCEDURE — 1101F PT FALLS ASSESS-DOCD LE1/YR: CPT | Mod: CPTII,S$GLB,, | Performed by: INTERNAL MEDICINE

## 2020-09-03 PROCEDURE — 1125F PR PAIN SEVERITY QUANTIFIED, PAIN PRESENT: ICD-10-PCS | Mod: S$GLB,,, | Performed by: INTERNAL MEDICINE

## 2020-09-03 PROCEDURE — 3079F PR MOST RECENT DIASTOLIC BLOOD PRESSURE 80-89 MM HG: ICD-10-PCS | Mod: CPTII,S$GLB,, | Performed by: INTERNAL MEDICINE

## 2020-09-03 PROCEDURE — G0009 PNEUMOCOCCAL CONJUGATE VACCINE 13-VALENT LESS THAN 5YO & GREATER THAN: ICD-10-PCS | Mod: S$GLB,,, | Performed by: INTERNAL MEDICINE

## 2020-09-03 PROCEDURE — 3008F PR BODY MASS INDEX (BMI) DOCUMENTED: ICD-10-PCS | Mod: CPTII,S$GLB,, | Performed by: INTERNAL MEDICINE

## 2020-09-03 PROCEDURE — G0009 ADMIN PNEUMOCOCCAL VACCINE: HCPCS | Mod: S$GLB,,, | Performed by: INTERNAL MEDICINE

## 2020-09-03 PROCEDURE — 3288F FALL RISK ASSESSMENT DOCD: CPT | Mod: CPTII,S$GLB,, | Performed by: INTERNAL MEDICINE

## 2020-09-03 PROCEDURE — 3288F PR FALLS RISK ASSESSMENT DOCUMENTED: ICD-10-PCS | Mod: CPTII,S$GLB,, | Performed by: INTERNAL MEDICINE

## 2020-09-03 PROCEDURE — 3075F PR MOST RECENT SYSTOLIC BLOOD PRESS GE 130-139MM HG: ICD-10-PCS | Mod: CPTII,S$GLB,, | Performed by: INTERNAL MEDICINE

## 2020-09-03 PROCEDURE — 3044F PR MOST RECENT HEMOGLOBIN A1C LEVEL <7.0%: ICD-10-PCS | Mod: CPTII,S$GLB,, | Performed by: INTERNAL MEDICINE

## 2020-09-03 PROCEDURE — 99397 PER PM REEVAL EST PAT 65+ YR: CPT | Mod: 25,S$GLB,, | Performed by: INTERNAL MEDICINE

## 2020-09-03 PROCEDURE — 3044F HG A1C LEVEL LT 7.0%: CPT | Mod: CPTII,S$GLB,, | Performed by: INTERNAL MEDICINE

## 2020-09-03 PROCEDURE — 90670 PCV13 VACCINE IM: CPT | Mod: S$GLB,,, | Performed by: INTERNAL MEDICINE

## 2020-09-03 PROCEDURE — 3008F BODY MASS INDEX DOCD: CPT | Mod: CPTII,S$GLB,, | Performed by: INTERNAL MEDICINE

## 2020-09-03 PROCEDURE — 99999 PR PBB SHADOW E&M-EST. PATIENT-LVL IV: CPT | Mod: PBBFAC,,, | Performed by: INTERNAL MEDICINE

## 2020-09-03 PROCEDURE — 1101F PR PT FALLS ASSESS DOC 0-1 FALLS W/OUT INJ PAST YR: ICD-10-PCS | Mod: CPTII,S$GLB,, | Performed by: INTERNAL MEDICINE

## 2020-09-03 RX ORDER — HYDROCHLOROTHIAZIDE 25 MG/1
25 TABLET ORAL DAILY
Qty: 90 TABLET | Refills: 1 | Status: SHIPPED | OUTPATIENT
Start: 2020-09-03 | End: 2020-11-04 | Stop reason: SDUPTHER

## 2020-09-03 RX ORDER — METHOCARBAMOL 500 MG/1
500 TABLET, FILM COATED ORAL NIGHTLY PRN
Qty: 15 TABLET | Refills: 0 | Status: SHIPPED | OUTPATIENT
Start: 2020-09-03 | End: 2020-09-13

## 2020-09-03 RX ORDER — METFORMIN HYDROCHLORIDE 500 MG/1
500 TABLET ORAL 2 TIMES DAILY WITH MEALS
Qty: 180 TABLET | Refills: 3 | Status: SHIPPED | OUTPATIENT
Start: 2020-09-03 | End: 2020-11-04 | Stop reason: SDUPTHER

## 2020-09-03 RX ORDER — ERYTHROMYCIN 5 MG/G
OINTMENT OPHTHALMIC NIGHTLY
Qty: 3.5 G | Refills: 0 | Status: SHIPPED | OUTPATIENT
Start: 2020-09-03 | End: 2021-07-12 | Stop reason: SDUPTHER

## 2020-09-29 ENCOUNTER — PATIENT MESSAGE (OUTPATIENT)
Dept: OTHER | Facility: OTHER | Age: 75
End: 2020-09-29

## 2020-10-01 ENCOUNTER — PATIENT MESSAGE (OUTPATIENT)
Dept: OTHER | Facility: OTHER | Age: 75
End: 2020-10-01

## 2020-10-02 ENCOUNTER — PATIENT OUTREACH (OUTPATIENT)
Dept: ADMINISTRATIVE | Facility: OTHER | Age: 75
End: 2020-10-02

## 2020-10-02 NOTE — PROGRESS NOTES
Requested updates within Care Everywhere.  Patient's chart was reviewed for overdue TAMANNA topics.  Open case request for Colonoscopy.  Immunizations reconciled.    Orders placed:n/a  Tasked appts:n/a  Labs Linked:n/a

## 2020-10-06 ENCOUNTER — OFFICE VISIT (OUTPATIENT)
Dept: DERMATOLOGY | Facility: CLINIC | Age: 75
End: 2020-10-06
Payer: MEDICARE

## 2020-10-06 DIAGNOSIS — L57.0 ACTINIC KERATOSES: Primary | ICD-10-CM

## 2020-10-06 DIAGNOSIS — Z12.83 SKIN EXAM, SCREENING FOR CANCER: ICD-10-CM

## 2020-10-06 DIAGNOSIS — Z85.828 HISTORY OF BASAL CELL CARCINOMA: ICD-10-CM

## 2020-10-06 DIAGNOSIS — L82.1 SEBORRHEIC KERATOSES: ICD-10-CM

## 2020-10-06 PROCEDURE — 1101F PT FALLS ASSESS-DOCD LE1/YR: CPT | Mod: HCNC,CPTII,S$GLB, | Performed by: DERMATOLOGY

## 2020-10-06 PROCEDURE — 99999 PR PBB SHADOW E&M-EST. PATIENT-LVL III: ICD-10-PCS | Mod: PBBFAC,HCNC,, | Performed by: DERMATOLOGY

## 2020-10-06 PROCEDURE — 17000 DESTRUCT PREMALG LESION: CPT | Mod: HCNC,S$GLB,, | Performed by: DERMATOLOGY

## 2020-10-06 PROCEDURE — 99213 OFFICE O/P EST LOW 20 MIN: CPT | Mod: 25,HCNC,S$GLB, | Performed by: DERMATOLOGY

## 2020-10-06 PROCEDURE — 1101F PR PT FALLS ASSESS DOC 0-1 FALLS W/OUT INJ PAST YR: ICD-10-PCS | Mod: HCNC,CPTII,S$GLB, | Performed by: DERMATOLOGY

## 2020-10-06 PROCEDURE — 1159F PR MEDICATION LIST DOCUMENTED IN MEDICAL RECORD: ICD-10-PCS | Mod: HCNC,S$GLB,, | Performed by: DERMATOLOGY

## 2020-10-06 PROCEDURE — 99999 PR PBB SHADOW E&M-EST. PATIENT-LVL III: CPT | Mod: PBBFAC,HCNC,, | Performed by: DERMATOLOGY

## 2020-10-06 PROCEDURE — 99213 PR OFFICE/OUTPT VISIT, EST, LEVL III, 20-29 MIN: ICD-10-PCS | Mod: 25,HCNC,S$GLB, | Performed by: DERMATOLOGY

## 2020-10-06 PROCEDURE — 17003 DESTRUCTION, PREMALIGNANT LESIONS; SECOND THROUGH 14 LESIONS: ICD-10-PCS | Mod: HCNC,S$GLB,, | Performed by: DERMATOLOGY

## 2020-10-06 PROCEDURE — 1126F PR PAIN SEVERITY QUANTIFIED, NO PAIN PRESENT: ICD-10-PCS | Mod: HCNC,S$GLB,, | Performed by: DERMATOLOGY

## 2020-10-06 PROCEDURE — 1159F MED LIST DOCD IN RCRD: CPT | Mod: HCNC,S$GLB,, | Performed by: DERMATOLOGY

## 2020-10-06 PROCEDURE — 17000 PR DESTRUCTION(LASER SURGERY,CRYOSURGERY,CHEMOSURGERY),PREMALIGNANT LESIONS,FIRST LESION: ICD-10-PCS | Mod: HCNC,S$GLB,, | Performed by: DERMATOLOGY

## 2020-10-06 PROCEDURE — 17003 DESTRUCT PREMALG LES 2-14: CPT | Mod: HCNC,S$GLB,, | Performed by: DERMATOLOGY

## 2020-10-06 PROCEDURE — 1126F AMNT PAIN NOTED NONE PRSNT: CPT | Mod: HCNC,S$GLB,, | Performed by: DERMATOLOGY

## 2020-10-06 NOTE — LETTER
October 6, 2020      Al Harvey MD  [address]  [facility] [address]           [facility] - Dermatology [address]  [address]  [facility] [address]  Phone: [phone]  Fax: [phone]          Patient: Ezekiel Mueller   MR Number: [id number]   YOB: 1945   Date of Visit: 10/6/2020       Dear Dr. Al Harvey:    Thank you for referring Ezekiel Mueller to me for evaluation. Attached you will find relevant portions of my assessment and plan of care.    If you have questions, please do not hesitate to call me. I look forward to following Ezekiel Mueller along with you.    Sincerely,    Jarred Hood MD    Enclosure  CC:  No Recipients    If you would like to receive this communication electronically, please contact [email or web address] or [phone] to request more information on [email or web address] Link access.    For providers and/or their staff who would like to refer a patient to [facility], please contact us through our one-stop-shop provider referral line, [facility] [place], at [phone].    If you feel you have received this communication in error or would no longer like to receive these types of communications, please e-mail [email or web address]

## 2020-10-06 NOTE — PROGRESS NOTES
Subjective:       Patient ID:  Ezekiel Mueller is a 74 y.o. male who presents for   Chief Complaint   Patient presents with    Skin Check     TBSE      Pt has dry spots on the ears for months.  Had cryo in the past.    Has bumps on the back.      Review of Systems   Constitutional: Negative.    HENT: Negative.    Respiratory: Negative.    Musculoskeletal: Negative.    Skin: Negative for daily sunscreen use.        Objective:    Physical Exam   Constitutional: He appears well-developed and well-nourished. No distress.   Eyes: No conjunctival no injection.   Cardiovascular: There is no local extremity swelling and no dependent edema.     Neurological: He is alert and oriented to person, place, and time. He is not disoriented.   Psychiatric: He has a normal mood and affect.   Skin:   Areas Examined (abnormalities noted in diagram):   Scalp / Hair Palpated and Inspected  Head / Face Inspection Performed  Neck Inspection Performed  Chest / Axilla Inspection Performed  Abdomen Inspection Performed  Genitals / Buttocks / Groin Inspection Performed  Back Inspection Performed  RUE Inspected  LUE Inspection Performed  RLE Inspected  LLE Inspection Performed  Nails and Digits Inspection Performed                   Diagram Legend     Erythematous scaling macule/papule c/w actinic keratosis       Vascular papule c/w angioma      Pigmented verrucoid papule/plaque c/w seborrheic keratosis      Yellow umbilicated papule c/w sebaceous hyperplasia      Irregularly shaped tan macule c/w lentigo     1-2 mm smooth white papules consistent with Milia      Movable subcutaneous cyst with punctum c/w epidermal inclusion cyst      Subcutaneous movable cyst c/w pilar cyst      Firm pink to brown papule c/w dermatofibroma      Pedunculated fleshy papule(s) c/w skin tag(s)      Evenly pigmented macule c/w junctional nevus     Mildly variegated pigmented, slightly irregular-bordered macule c/w mildly atypical nevus      Flesh colored to evenly  pigmented papule c/w intradermal nevus       Pink pearly papule/plaque c/w basal cell carcinoma      Erythematous hyperkeratotic cursted plaque c/w SCC      Surgical scar with no sign of skin cancer recurrence      Open and closed comedones      Inflammatory papules and pustules      Verrucoid papule consistent consistent with wart     Erythematous eczematous patches and plaques     Dystrophic onycholytic nail with subungual debris c/w onychomycosis     Umbilicated papule    Erythematous-base heme-crusted tan verrucoid plaque consistent with inflamed seborrheic keratosis     Erythematous Silvery Scaling Plaque c/w Psoriasis     See annotation      Assessment / Plan:        Actinic keratoses  Discussed all of the following:  Actinic keratosis is a skin growth caused by sun damage. These growths are not cancer, but they may develop into skin cancer (precancerous). Many people get these growths, especially as they age. This is because of cumulative sun exposure over years. Multiple growths are called actinic keratoses.    Patient instructed in importance in daily sun protection. Sun avoidance and topical protection discussed.     Patient encouraged to wear hat for all outdoor exposure.     Also discussed sun protective clothing.  Cryosurgery Procedure Note    Verbal consent from the patient is obtained including, but not limited to, risk of hypopigmentation/hyperpigmentation, scar, recurrence of lesion. The patient is aware of the precancerous quality and need for treatment of these lesions. Liquid nitrogen cryosurgery is applied to the 5 actinic keratoses, as detailed in the physical exam, to produce a freeze injury. The patient is aware that blisters may form and is instructed on wound care with gentle cleansing and use of vaseline ointment to keep moist until healed. The patient is supplied a handout on cryosurgery and is instructed to call if lesions do not completely resolve.  Patient instructed in importance in  daily sun protection. Sun avoidance and topical protection discussed.     Patient encouraged to wear hat for all outdoor exposure.     Also discussed sun protective clothing.    History of basal cell carcinoma  -     Ambulatory referral/consult to Dermatology  No signs of recurrence are noted in previous surgical areas or scars.    Seborrheic keratoses  Discussed with patient the benign nature of these lesions and that no treatment is indicated.      Skin exam, screening for cancer  No other seriously suspicious lesions noted for body areas examined today.  Patient to inform of us if they notice any dark or changing or suspicious spots in areas not examined today.  Follow up for routine monitoring recommended to patient.    Instructed patient to watch out for dark spots, bleeding spots, crusty spots, sores that break out repeatedly in the same spot.  These characteristics are risk factors for skin cancer, and patient is to notify us if they experience any of these symptoms.             Follow up in about 1 year (around 10/6/2021) for TBSE.

## 2020-10-06 NOTE — PATIENT INSTRUCTIONS

## 2020-11-04 DIAGNOSIS — E11.65 CONTROLLED TYPE 2 DIABETES MELLITUS WITH HYPERGLYCEMIA, WITHOUT LONG-TERM CURRENT USE OF INSULIN: ICD-10-CM

## 2020-11-04 DIAGNOSIS — I10 HTN, GOAL BELOW 140/90: ICD-10-CM

## 2020-11-04 RX ORDER — METFORMIN HYDROCHLORIDE 500 MG/1
500 TABLET ORAL 2 TIMES DAILY WITH MEALS
Qty: 180 TABLET | Refills: 3 | Status: SHIPPED | OUTPATIENT
Start: 2020-11-04 | End: 2021-05-02 | Stop reason: SDUPTHER

## 2020-11-04 RX ORDER — HYDROCHLOROTHIAZIDE 25 MG/1
25 TABLET ORAL DAILY
Qty: 90 TABLET | Refills: 1 | Status: SHIPPED | OUTPATIENT
Start: 2020-11-04 | End: 2021-04-27

## 2020-11-04 NOTE — TELEPHONE ENCOUNTER
Care Due:                  Date            Visit Type   Department     Provider  --------------------------------------------------------------------------------                                             [facility]  Last Visit: 09-      None         MEDICINE       JELENA PRECIADO  Next Visit: None Scheduled  None         None Found                                                            Last  Test          Frequency    Reason                     Performed    Due Date  --------------------------------------------------------------------------------    HBA1C.......  6 months...  metFORMIN................  03- 09-    Powered by [employer]. Reference number: [id number]. 11/04/2020 10:47:33 AM   CST

## 2020-11-04 NOTE — TELEPHONE ENCOUNTER
----- Message from Rdaha Trujillo sent at 11/4/2020 10:22 AM CST -----  Contact: [phone]  Requesting an RX refill or new RX.  Is this a refill or new RX: refill  RX name and strength:hydroCHLOROthiazide (HYDRODIURIL) 25 MG tablet 90 tablet   Is this a 30 day or 90 day RX: 90  Pharmacy name and phone # (copy/paste from chart):  [facility] - [facility] [address] [phone] (Phone)  [phone] (Fax)  Comments:         Requesting an RX refill or new RX.  Is this a refill or new RX: refill  RX name and strength:metFORMIN (GLUCOPHAGE) 500 MG tablet 180 tablet   Is this a 30 day or 90 day RX: 90  Pharmacy name and phone # (copy/paste from chart):  [facility] - [facility] [address] [phone] (Phone)  [phone] (Fax)  Comments:

## 2020-12-11 ENCOUNTER — PATIENT MESSAGE (OUTPATIENT)
Dept: OTHER | Facility: OTHER | Age: 75
End: 2020-12-11

## 2021-04-09 ENCOUNTER — TELEPHONE (OUTPATIENT)
Dept: INTERNAL MEDICINE | Facility: CLINIC | Age: 76
End: 2021-04-09

## 2021-04-09 DIAGNOSIS — Z87.19 HISTORY OF PANCREATITIS: ICD-10-CM

## 2021-04-09 DIAGNOSIS — R73.03 BORDERLINE DIABETES MELLITUS: ICD-10-CM

## 2021-04-09 DIAGNOSIS — R73.01 IMPAIRED FASTING BLOOD SUGAR: ICD-10-CM

## 2021-04-09 DIAGNOSIS — R74.8 ELEVATED LIVER ENZYMES: ICD-10-CM

## 2021-04-09 DIAGNOSIS — I10 ESSENTIAL HYPERTENSION: Primary | ICD-10-CM

## 2021-04-09 DIAGNOSIS — Z12.5 ENCOUNTER FOR SCREENING FOR MALIGNANT NEOPLASM OF PROSTATE: ICD-10-CM

## 2021-04-09 DIAGNOSIS — E78.5 HYPERLIPIDEMIA LDL GOAL <100: ICD-10-CM

## 2021-04-09 DIAGNOSIS — E79.0 HYPERURICEMIA: ICD-10-CM

## 2021-04-09 DIAGNOSIS — I70.0 ATHEROSCLEROSIS OF AORTA: ICD-10-CM

## 2021-04-15 ENCOUNTER — PATIENT MESSAGE (OUTPATIENT)
Dept: RESEARCH | Facility: HOSPITAL | Age: 76
End: 2021-04-15

## 2021-04-23 ENCOUNTER — TELEPHONE (OUTPATIENT)
Dept: HEPATOLOGY | Facility: CLINIC | Age: 76
End: 2021-04-23

## 2021-04-23 ENCOUNTER — LAB VISIT (OUTPATIENT)
Dept: LAB | Facility: HOSPITAL | Age: 76
End: 2021-04-23
Attending: INTERNAL MEDICINE
Payer: MEDICARE

## 2021-04-23 DIAGNOSIS — I70.0 ATHEROSCLEROSIS OF AORTA: ICD-10-CM

## 2021-04-23 DIAGNOSIS — K76.0 FATTY LIVER: ICD-10-CM

## 2021-04-23 DIAGNOSIS — R74.8 ELEVATED LIVER ENZYMES: ICD-10-CM

## 2021-04-23 DIAGNOSIS — K74.01 EARLY HEPATIC FIBROSIS: ICD-10-CM

## 2021-04-23 DIAGNOSIS — Z87.19 HISTORY OF PANCREATITIS: ICD-10-CM

## 2021-04-23 DIAGNOSIS — K76.0 FATTY LIVER: Primary | ICD-10-CM

## 2021-04-23 DIAGNOSIS — I10 ESSENTIAL HYPERTENSION: ICD-10-CM

## 2021-04-23 DIAGNOSIS — E79.0 HYPERURICEMIA: ICD-10-CM

## 2021-04-23 DIAGNOSIS — Z12.5 ENCOUNTER FOR SCREENING FOR MALIGNANT NEOPLASM OF PROSTATE: ICD-10-CM

## 2021-04-23 DIAGNOSIS — E78.5 HYPERLIPIDEMIA LDL GOAL <100: ICD-10-CM

## 2021-04-23 DIAGNOSIS — R73.01 IMPAIRED FASTING BLOOD SUGAR: ICD-10-CM

## 2021-04-23 DIAGNOSIS — R73.03 BORDERLINE DIABETES MELLITUS: ICD-10-CM

## 2021-04-23 LAB
ALBUMIN SERPL BCP-MCNC: 4.4 G/DL (ref 3.5–5.2)
ALBUMIN SERPL BCP-MCNC: 4.4 G/DL (ref 3.5–5.2)
ALP SERPL-CCNC: 51 U/L (ref 55–135)
ALP SERPL-CCNC: 51 U/L (ref 55–135)
ALT SERPL W/O P-5'-P-CCNC: 43 U/L (ref 10–44)
ALT SERPL W/O P-5'-P-CCNC: 43 U/L (ref 10–44)
ANION GAP SERPL CALC-SCNC: 8 MMOL/L (ref 8–16)
AST SERPL-CCNC: 30 U/L (ref 10–40)
AST SERPL-CCNC: 30 U/L (ref 10–40)
BASOPHILS # BLD AUTO: 0.04 K/UL (ref 0–0.2)
BASOPHILS NFR BLD: 0.7 % (ref 0–1.9)
BILIRUB DIRECT SERPL-MCNC: 0.3 MG/DL (ref 0.1–0.3)
BILIRUB SERPL-MCNC: 0.8 MG/DL (ref 0.1–1)
BILIRUB SERPL-MCNC: 0.8 MG/DL (ref 0.1–1)
BUN SERPL-MCNC: 13 MG/DL (ref 8–23)
CALCIUM SERPL-MCNC: 9.7 MG/DL (ref 8.7–10.5)
CHLORIDE SERPL-SCNC: 102 MMOL/L (ref 95–110)
CHOLEST SERPL-MCNC: 192 MG/DL (ref 120–199)
CHOLEST/HDLC SERPL: 4 {RATIO} (ref 2–5)
CO2 SERPL-SCNC: 32 MMOL/L (ref 23–29)
COMPLEXED PSA SERPL-MCNC: 0.8 NG/ML (ref 0–4)
CREAT SERPL-MCNC: 1 MG/DL (ref 0.5–1.4)
DIFFERENTIAL METHOD: NORMAL
EOSINOPHIL # BLD AUTO: 0.2 K/UL (ref 0–0.5)
EOSINOPHIL NFR BLD: 3.6 % (ref 0–8)
ERYTHROCYTE [DISTWIDTH] IN BLOOD BY AUTOMATED COUNT: 13.3 % (ref 11.5–14.5)
EST. GFR  (AFRICAN AMERICAN): >60 ML/MIN/1.73 M^2
EST. GFR  (NON AFRICAN AMERICAN): >60 ML/MIN/1.73 M^2
ESTIMATED AVG GLUCOSE: 120 MG/DL (ref 68–131)
GLUCOSE SERPL-MCNC: 116 MG/DL (ref 70–110)
HBA1C MFR BLD: 5.8 % (ref 4–5.6)
HCT VFR BLD AUTO: 46.6 % (ref 40–54)
HDLC SERPL-MCNC: 48 MG/DL (ref 40–75)
HDLC SERPL: 25 % (ref 20–50)
HGB BLD-MCNC: 15.5 G/DL (ref 14–18)
IMM GRANULOCYTES # BLD AUTO: 0.02 K/UL (ref 0–0.04)
IMM GRANULOCYTES NFR BLD AUTO: 0.3 % (ref 0–0.5)
LDLC SERPL CALC-MCNC: 122 MG/DL (ref 63–159)
LYMPHOCYTES # BLD AUTO: 1.9 K/UL (ref 1–4.8)
LYMPHOCYTES NFR BLD: 32.9 % (ref 18–48)
MCH RBC QN AUTO: 28.4 PG (ref 27–31)
MCHC RBC AUTO-ENTMCNC: 33.3 G/DL (ref 32–36)
MCV RBC AUTO: 86 FL (ref 82–98)
MONOCYTES # BLD AUTO: 0.6 K/UL (ref 0.3–1)
MONOCYTES NFR BLD: 10.8 % (ref 4–15)
NEUTROPHILS # BLD AUTO: 3 K/UL (ref 1.8–7.7)
NEUTROPHILS NFR BLD: 51.7 % (ref 38–73)
NONHDLC SERPL-MCNC: 144 MG/DL
NRBC BLD-RTO: 0 /100 WBC
PLATELET # BLD AUTO: 200 K/UL (ref 150–450)
PMV BLD AUTO: 11.6 FL (ref 9.2–12.9)
POTASSIUM SERPL-SCNC: 4.2 MMOL/L (ref 3.5–5.1)
PROT SERPL-MCNC: 8.1 G/DL (ref 6–8.4)
PROT SERPL-MCNC: 8.1 G/DL (ref 6–8.4)
RBC # BLD AUTO: 5.45 M/UL (ref 4.6–6.2)
SODIUM SERPL-SCNC: 142 MMOL/L (ref 136–145)
TRIGL SERPL-MCNC: 110 MG/DL (ref 30–150)
URATE SERPL-MCNC: 7.6 MG/DL (ref 3.4–7)
WBC # BLD AUTO: 5.86 K/UL (ref 3.9–12.7)

## 2021-04-23 PROCEDURE — 80053 COMPREHEN METABOLIC PANEL: CPT | Performed by: INTERNAL MEDICINE

## 2021-04-23 PROCEDURE — 80061 LIPID PANEL: CPT | Performed by: INTERNAL MEDICINE

## 2021-04-23 PROCEDURE — 84550 ASSAY OF BLOOD/URIC ACID: CPT | Performed by: INTERNAL MEDICINE

## 2021-04-23 PROCEDURE — 80076 HEPATIC FUNCTION PANEL: CPT | Performed by: NURSE PRACTITIONER

## 2021-04-23 PROCEDURE — 85025 COMPLETE CBC W/AUTO DIFF WBC: CPT | Performed by: INTERNAL MEDICINE

## 2021-04-23 PROCEDURE — 36415 COLL VENOUS BLD VENIPUNCTURE: CPT | Performed by: INTERNAL MEDICINE

## 2021-04-23 PROCEDURE — 84153 ASSAY OF PSA TOTAL: CPT | Performed by: INTERNAL MEDICINE

## 2021-04-23 PROCEDURE — 82248 BILIRUBIN DIRECT: CPT | Performed by: NURSE PRACTITIONER

## 2021-04-23 PROCEDURE — 83036 HEMOGLOBIN GLYCOSYLATED A1C: CPT | Performed by: INTERNAL MEDICINE

## 2021-04-25 ENCOUNTER — PATIENT MESSAGE (OUTPATIENT)
Dept: HEPATOLOGY | Facility: CLINIC | Age: 76
End: 2021-04-25

## 2021-04-26 DIAGNOSIS — I10 ESSENTIAL HYPERTENSION: ICD-10-CM

## 2021-04-26 RX ORDER — LOSARTAN POTASSIUM 25 MG/1
12.5 TABLET ORAL DAILY
Qty: 45 TABLET | Refills: 1 | Status: CANCELLED | OUTPATIENT
Start: 2021-04-26

## 2021-04-27 DIAGNOSIS — I10 HTN, GOAL BELOW 140/90: ICD-10-CM

## 2021-04-27 RX ORDER — HYDROCHLOROTHIAZIDE 25 MG/1
TABLET ORAL
Qty: 90 TABLET | Refills: 1 | Status: SHIPPED | OUTPATIENT
Start: 2021-04-27 | End: 2021-09-09

## 2021-05-01 ENCOUNTER — OFFICE VISIT (OUTPATIENT)
Dept: INTERNAL MEDICINE | Facility: CLINIC | Age: 76
End: 2021-05-01
Payer: MEDICARE

## 2021-05-01 VITALS
HEIGHT: 75 IN | DIASTOLIC BLOOD PRESSURE: 72 MMHG | SYSTOLIC BLOOD PRESSURE: 132 MMHG | BODY MASS INDEX: 27.33 KG/M2 | OXYGEN SATURATION: 97 % | WEIGHT: 219.81 LBS | HEART RATE: 69 BPM

## 2021-05-01 DIAGNOSIS — M18.10 ARTHRITIS OF CARPOMETACARPAL (CMC) JOINT OF THUMB: ICD-10-CM

## 2021-05-01 DIAGNOSIS — E78.5 HYPERLIPIDEMIA LDL GOAL <100: Primary | ICD-10-CM

## 2021-05-01 DIAGNOSIS — I70.0 ATHEROSCLEROSIS OF AORTA: ICD-10-CM

## 2021-05-01 DIAGNOSIS — Z12.11 ENCOUNTER FOR SCREENING FOR COLORECTAL MALIGNANT NEOPLASM: ICD-10-CM

## 2021-05-01 DIAGNOSIS — I27.20 PULMONARY HYPERTENSION: ICD-10-CM

## 2021-05-01 DIAGNOSIS — H91.90 HEARING LOSS, UNSPECIFIED HEARING LOSS TYPE, UNSPECIFIED LATERALITY: ICD-10-CM

## 2021-05-01 DIAGNOSIS — R73.03 BORDERLINE DIABETES MELLITUS: ICD-10-CM

## 2021-05-01 DIAGNOSIS — Z12.12 ENCOUNTER FOR SCREENING FOR COLORECTAL MALIGNANT NEOPLASM: ICD-10-CM

## 2021-05-01 DIAGNOSIS — I10 ESSENTIAL HYPERTENSION: ICD-10-CM

## 2021-05-01 PROCEDURE — 1101F PT FALLS ASSESS-DOCD LE1/YR: CPT | Mod: CPTII,S$GLB,, | Performed by: INTERNAL MEDICINE

## 2021-05-01 PROCEDURE — 1125F AMNT PAIN NOTED PAIN PRSNT: CPT | Mod: S$GLB,,, | Performed by: INTERNAL MEDICINE

## 2021-05-01 PROCEDURE — 99499 RISK ADDL DX/OHS AUDIT: ICD-10-PCS | Mod: S$GLB,,, | Performed by: INTERNAL MEDICINE

## 2021-05-01 PROCEDURE — 1101F PR PT FALLS ASSESS DOC 0-1 FALLS W/OUT INJ PAST YR: ICD-10-PCS | Mod: CPTII,S$GLB,, | Performed by: INTERNAL MEDICINE

## 2021-05-01 PROCEDURE — 99214 PR OFFICE/OUTPT VISIT, EST, LEVL IV, 30-39 MIN: ICD-10-PCS | Mod: S$GLB,,, | Performed by: INTERNAL MEDICINE

## 2021-05-01 PROCEDURE — 1125F PR PAIN SEVERITY QUANTIFIED, PAIN PRESENT: ICD-10-PCS | Mod: S$GLB,,, | Performed by: INTERNAL MEDICINE

## 2021-05-01 PROCEDURE — 3288F PR FALLS RISK ASSESSMENT DOCUMENTED: ICD-10-PCS | Mod: CPTII,S$GLB,, | Performed by: INTERNAL MEDICINE

## 2021-05-01 PROCEDURE — 99499 UNLISTED E&M SERVICE: CPT | Mod: S$GLB,,, | Performed by: INTERNAL MEDICINE

## 2021-05-01 PROCEDURE — 1159F MED LIST DOCD IN RCRD: CPT | Mod: S$GLB,,, | Performed by: INTERNAL MEDICINE

## 2021-05-01 PROCEDURE — 1159F PR MEDICATION LIST DOCUMENTED IN MEDICAL RECORD: ICD-10-PCS | Mod: S$GLB,,, | Performed by: INTERNAL MEDICINE

## 2021-05-01 PROCEDURE — 99999 PR PBB SHADOW E&M-EST. PATIENT-LVL V: CPT | Mod: PBBFAC,,, | Performed by: INTERNAL MEDICINE

## 2021-05-01 PROCEDURE — 3008F PR BODY MASS INDEX (BMI) DOCUMENTED: ICD-10-PCS | Mod: CPTII,S$GLB,, | Performed by: INTERNAL MEDICINE

## 2021-05-01 PROCEDURE — 99999 PR PBB SHADOW E&M-EST. PATIENT-LVL V: ICD-10-PCS | Mod: PBBFAC,,, | Performed by: INTERNAL MEDICINE

## 2021-05-01 PROCEDURE — 3288F FALL RISK ASSESSMENT DOCD: CPT | Mod: CPTII,S$GLB,, | Performed by: INTERNAL MEDICINE

## 2021-05-01 PROCEDURE — 99214 OFFICE O/P EST MOD 30 MIN: CPT | Mod: S$GLB,,, | Performed by: INTERNAL MEDICINE

## 2021-05-01 PROCEDURE — 3008F BODY MASS INDEX DOCD: CPT | Mod: CPTII,S$GLB,, | Performed by: INTERNAL MEDICINE

## 2021-05-02 PROBLEM — H91.90 HEARING LOSS: Status: ACTIVE | Noted: 2021-05-02

## 2021-05-02 RX ORDER — METFORMIN HYDROCHLORIDE 500 MG/1
500 TABLET ORAL 2 TIMES DAILY WITH MEALS
Qty: 180 TABLET | Refills: 3 | Status: SHIPPED | OUTPATIENT
Start: 2021-05-02 | End: 2022-05-12 | Stop reason: SDUPTHER

## 2021-05-03 ENCOUNTER — TELEPHONE (OUTPATIENT)
Dept: OTOLARYNGOLOGY | Facility: CLINIC | Age: 76
End: 2021-05-03

## 2021-05-03 ENCOUNTER — TELEPHONE (OUTPATIENT)
Dept: INTERNAL MEDICINE | Facility: CLINIC | Age: 76
End: 2021-05-03

## 2021-05-03 DIAGNOSIS — Z12.11 COLON CANCER SCREENING: Primary | ICD-10-CM

## 2021-05-07 ENCOUNTER — IMMUNIZATION (OUTPATIENT)
Dept: PHARMACY | Facility: CLINIC | Age: 76
End: 2021-05-07
Payer: MEDICARE

## 2021-05-07 DIAGNOSIS — Z23 NEED FOR VACCINATION: Primary | ICD-10-CM

## 2021-05-07 DIAGNOSIS — R52 PAIN: Primary | ICD-10-CM

## 2021-05-13 ENCOUNTER — PATIENT OUTREACH (OUTPATIENT)
Dept: ADMINISTRATIVE | Facility: OTHER | Age: 76
End: 2021-05-13

## 2021-05-13 ENCOUNTER — TELEPHONE (OUTPATIENT)
Dept: ORTHOPEDICS | Facility: CLINIC | Age: 76
End: 2021-05-13

## 2021-05-14 ENCOUNTER — OFFICE VISIT (OUTPATIENT)
Dept: ORTHOPEDICS | Facility: CLINIC | Age: 76
End: 2021-05-14
Payer: MEDICARE

## 2021-05-14 ENCOUNTER — HOSPITAL ENCOUNTER (OUTPATIENT)
Dept: RADIOLOGY | Facility: OTHER | Age: 76
Discharge: HOME | End: 2021-05-14
Attending: PHYSICIAN ASSISTANT
Payer: MEDICARE

## 2021-05-14 VITALS
WEIGHT: 219 LBS | SYSTOLIC BLOOD PRESSURE: 156 MMHG | DIASTOLIC BLOOD PRESSURE: 82 MMHG | BODY MASS INDEX: 27.23 KG/M2 | HEART RATE: 61 BPM | HEIGHT: 75 IN

## 2021-05-14 DIAGNOSIS — M19.049 HAND ARTHRITIS: Primary | ICD-10-CM

## 2021-05-14 DIAGNOSIS — R52 PAIN: ICD-10-CM

## 2021-05-14 DIAGNOSIS — M18.10 ARTHRITIS OF CARPOMETACARPAL (CMC) JOINT OF THUMB: ICD-10-CM

## 2021-05-14 PROCEDURE — 73130 X-RAY EXAM OF HAND: CPT | Mod: 26,LT,, | Performed by: RADIOLOGY

## 2021-05-14 PROCEDURE — 3008F BODY MASS INDEX DOCD: CPT | Mod: CPTII,S$GLB,, | Performed by: PHYSICIAN ASSISTANT

## 2021-05-14 PROCEDURE — 73130 X-RAY EXAM OF HAND: CPT | Mod: TC,50,FY

## 2021-05-14 PROCEDURE — 1101F PT FALLS ASSESS-DOCD LE1/YR: CPT | Mod: CPTII,S$GLB,, | Performed by: PHYSICIAN ASSISTANT

## 2021-05-14 PROCEDURE — 1125F PR PAIN SEVERITY QUANTIFIED, PAIN PRESENT: ICD-10-PCS | Mod: S$GLB,,, | Performed by: PHYSICIAN ASSISTANT

## 2021-05-14 PROCEDURE — 99213 OFFICE O/P EST LOW 20 MIN: CPT | Mod: 25,S$GLB,, | Performed by: PHYSICIAN ASSISTANT

## 2021-05-14 PROCEDURE — 20600 DRAIN/INJ JOINT/BURSA W/O US: CPT | Mod: LT,S$GLB,, | Performed by: PHYSICIAN ASSISTANT

## 2021-05-14 PROCEDURE — 1101F PR PT FALLS ASSESS DOC 0-1 FALLS W/OUT INJ PAST YR: ICD-10-PCS | Mod: CPTII,S$GLB,, | Performed by: PHYSICIAN ASSISTANT

## 2021-05-14 PROCEDURE — 1159F PR MEDICATION LIST DOCUMENTED IN MEDICAL RECORD: ICD-10-PCS | Mod: S$GLB,,, | Performed by: PHYSICIAN ASSISTANT

## 2021-05-14 PROCEDURE — 99999 PR PBB SHADOW E&M-EST. PATIENT-LVL III: CPT | Mod: PBBFAC,,, | Performed by: PHYSICIAN ASSISTANT

## 2021-05-14 PROCEDURE — 99213 PR OFFICE/OUTPT VISIT, EST, LEVL III, 20-29 MIN: ICD-10-PCS | Mod: 25,S$GLB,, | Performed by: PHYSICIAN ASSISTANT

## 2021-05-14 PROCEDURE — 1159F MED LIST DOCD IN RCRD: CPT | Mod: S$GLB,,, | Performed by: PHYSICIAN ASSISTANT

## 2021-05-14 PROCEDURE — 3008F PR BODY MASS INDEX (BMI) DOCUMENTED: ICD-10-PCS | Mod: CPTII,S$GLB,, | Performed by: PHYSICIAN ASSISTANT

## 2021-05-14 PROCEDURE — 99999 PR PBB SHADOW E&M-EST. PATIENT-LVL III: ICD-10-PCS | Mod: PBBFAC,,, | Performed by: PHYSICIAN ASSISTANT

## 2021-05-14 PROCEDURE — 3288F PR FALLS RISK ASSESSMENT DOCUMENTED: ICD-10-PCS | Mod: CPTII,S$GLB,, | Performed by: PHYSICIAN ASSISTANT

## 2021-05-14 PROCEDURE — 20600 PR DRAIN/INJECT SMALL JOINT/BURSA: ICD-10-PCS | Mod: LT,S$GLB,, | Performed by: PHYSICIAN ASSISTANT

## 2021-05-14 PROCEDURE — 3288F FALL RISK ASSESSMENT DOCD: CPT | Mod: CPTII,S$GLB,, | Performed by: PHYSICIAN ASSISTANT

## 2021-05-14 PROCEDURE — 73130 XR HAND COMPLETE 3 VIEWS BILATERAL: ICD-10-PCS | Mod: 26,RT,, | Performed by: RADIOLOGY

## 2021-05-14 PROCEDURE — 73130 X-RAY EXAM OF HAND: CPT | Mod: 26,RT,, | Performed by: RADIOLOGY

## 2021-05-14 PROCEDURE — 1125F AMNT PAIN NOTED PAIN PRSNT: CPT | Mod: S$GLB,,, | Performed by: PHYSICIAN ASSISTANT

## 2021-05-14 RX ORDER — DEXAMETHASONE SODIUM PHOSPHATE 4 MG/ML
4 INJECTION, SOLUTION INTRA-ARTICULAR; INTRALESIONAL; INTRAMUSCULAR; INTRAVENOUS; SOFT TISSUE
Status: COMPLETED | OUTPATIENT
Start: 2021-05-14 | End: 2021-05-14

## 2021-05-14 RX ORDER — DEXAMETHASONE SODIUM PHOSPHATE 4 MG/ML
2 INJECTION, SOLUTION INTRA-ARTICULAR; INTRALESIONAL; INTRAMUSCULAR; INTRAVENOUS; SOFT TISSUE
Status: DISCONTINUED | OUTPATIENT
Start: 2021-05-14 | End: 2021-05-14

## 2021-05-14 RX ORDER — LIDOCAINE HYDROCHLORIDE 10 MG/ML
0.5 INJECTION, SOLUTION EPIDURAL; INFILTRATION; INTRACAUDAL; PERINEURAL ONCE
Status: DISCONTINUED | OUTPATIENT
Start: 2021-05-14 | End: 2021-05-14

## 2021-05-14 RX ORDER — LIDOCAINE HYDROCHLORIDE 10 MG/ML
0.5 INJECTION, SOLUTION EPIDURAL; INFILTRATION; INTRACAUDAL; PERINEURAL ONCE
Status: COMPLETED | OUTPATIENT
Start: 2021-05-14 | End: 2021-05-14

## 2021-05-14 RX ADMIN — LIDOCAINE HYDROCHLORIDE 5 MG: 10 INJECTION, SOLUTION EPIDURAL; INFILTRATION; INTRACAUDAL; PERINEURAL at 10:05

## 2021-05-14 RX ADMIN — DEXAMETHASONE SODIUM PHOSPHATE 4 MG: 4 INJECTION, SOLUTION INTRA-ARTICULAR; INTRALESIONAL; INTRAMUSCULAR; INTRAVENOUS; SOFT TISSUE at 09:05

## 2021-05-21 ENCOUNTER — PROCEDURE VISIT (OUTPATIENT)
Dept: HEPATOLOGY | Facility: CLINIC | Age: 76
End: 2021-05-21
Payer: MEDICARE

## 2021-05-21 ENCOUNTER — OFFICE VISIT (OUTPATIENT)
Dept: HEPATOLOGY | Facility: CLINIC | Age: 76
End: 2021-05-21
Payer: MEDICARE

## 2021-05-21 VITALS
SYSTOLIC BLOOD PRESSURE: 162 MMHG | BODY MASS INDEX: 27.52 KG/M2 | HEIGHT: 75 IN | WEIGHT: 221.31 LBS | RESPIRATION RATE: 18 BRPM | DIASTOLIC BLOOD PRESSURE: 81 MMHG | HEART RATE: 75 BPM | OXYGEN SATURATION: 98 %

## 2021-05-21 DIAGNOSIS — R74.8 ELEVATED LIVER ENZYMES: ICD-10-CM

## 2021-05-21 DIAGNOSIS — K76.0 FATTY LIVER: Primary | ICD-10-CM

## 2021-05-21 DIAGNOSIS — K74.01 EARLY HEPATIC FIBROSIS: ICD-10-CM

## 2021-05-21 PROCEDURE — 3008F PR BODY MASS INDEX (BMI) DOCUMENTED: ICD-10-PCS | Mod: CPTII,S$GLB,, | Performed by: NURSE PRACTITIONER

## 2021-05-21 PROCEDURE — 1159F PR MEDICATION LIST DOCUMENTED IN MEDICAL RECORD: ICD-10-PCS | Mod: S$GLB,,, | Performed by: NURSE PRACTITIONER

## 2021-05-21 PROCEDURE — 99499 RISK ADDL DX/OHS AUDIT: ICD-10-PCS | Mod: S$GLB,,, | Performed by: NURSE PRACTITIONER

## 2021-05-21 PROCEDURE — 1101F PT FALLS ASSESS-DOCD LE1/YR: CPT | Mod: CPTII,S$GLB,, | Performed by: NURSE PRACTITIONER

## 2021-05-21 PROCEDURE — 3288F PR FALLS RISK ASSESSMENT DOCUMENTED: ICD-10-PCS | Mod: CPTII,S$GLB,, | Performed by: NURSE PRACTITIONER

## 2021-05-21 PROCEDURE — 99214 PR OFFICE/OUTPT VISIT, EST, LEVL IV, 30-39 MIN: ICD-10-PCS | Mod: S$GLB,,, | Performed by: NURSE PRACTITIONER

## 2021-05-21 PROCEDURE — 99214 OFFICE O/P EST MOD 30 MIN: CPT | Mod: S$GLB,,, | Performed by: NURSE PRACTITIONER

## 2021-05-21 PROCEDURE — 99499 UNLISTED E&M SERVICE: CPT | Mod: S$GLB,,, | Performed by: NURSE PRACTITIONER

## 2021-05-21 PROCEDURE — 91200 FIBROSCAN (VIBRATION CONTROLLED TRANSIENT ELASTOGRAPHY): ICD-10-PCS | Mod: S$GLB,,, | Performed by: NURSE PRACTITIONER

## 2021-05-21 PROCEDURE — 91200 LIVER ELASTOGRAPHY: CPT | Mod: S$GLB,,, | Performed by: NURSE PRACTITIONER

## 2021-05-21 PROCEDURE — 1126F PR PAIN SEVERITY QUANTIFIED, NO PAIN PRESENT: ICD-10-PCS | Mod: S$GLB,,, | Performed by: NURSE PRACTITIONER

## 2021-05-21 PROCEDURE — 1159F MED LIST DOCD IN RCRD: CPT | Mod: S$GLB,,, | Performed by: NURSE PRACTITIONER

## 2021-05-21 PROCEDURE — 3008F BODY MASS INDEX DOCD: CPT | Mod: CPTII,S$GLB,, | Performed by: NURSE PRACTITIONER

## 2021-05-21 PROCEDURE — 99999 PR PBB SHADOW E&M-EST. PATIENT-LVL IV: ICD-10-PCS | Mod: PBBFAC,,, | Performed by: NURSE PRACTITIONER

## 2021-05-21 PROCEDURE — 3288F FALL RISK ASSESSMENT DOCD: CPT | Mod: CPTII,S$GLB,, | Performed by: NURSE PRACTITIONER

## 2021-05-21 PROCEDURE — 1126F AMNT PAIN NOTED NONE PRSNT: CPT | Mod: S$GLB,,, | Performed by: NURSE PRACTITIONER

## 2021-05-21 PROCEDURE — 1101F PR PT FALLS ASSESS DOC 0-1 FALLS W/OUT INJ PAST YR: ICD-10-PCS | Mod: CPTII,S$GLB,, | Performed by: NURSE PRACTITIONER

## 2021-05-21 PROCEDURE — 99999 PR PBB SHADOW E&M-EST. PATIENT-LVL IV: CPT | Mod: PBBFAC,,, | Performed by: NURSE PRACTITIONER

## 2021-05-25 ENCOUNTER — PES CALL (OUTPATIENT)
Dept: ADMINISTRATIVE | Facility: CLINIC | Age: 76
End: 2021-05-25

## 2021-06-04 DIAGNOSIS — I10 HYPERTENSION, UNSPECIFIED TYPE: Primary | ICD-10-CM

## 2021-06-08 ENCOUNTER — IMMUNIZATION (OUTPATIENT)
Dept: PHARMACY | Facility: CLINIC | Age: 76
End: 2021-06-08
Payer: MEDICARE

## 2021-06-08 DIAGNOSIS — Z23 NEED FOR VACCINATION: Primary | ICD-10-CM

## 2021-06-16 ENCOUNTER — PATIENT OUTREACH (OUTPATIENT)
Dept: ADMINISTRATIVE | Facility: OTHER | Age: 76
End: 2021-06-16

## 2021-07-02 DIAGNOSIS — Z12.11 SPECIAL SCREENING FOR MALIGNANT NEOPLASMS, COLON: Primary | ICD-10-CM

## 2021-07-02 RX ORDER — SODIUM, POTASSIUM,MAG SULFATES 17.5-3.13G
1 SOLUTION, RECONSTITUTED, ORAL ORAL DAILY
Qty: 1 KIT | Refills: 0 | Status: SHIPPED | OUTPATIENT
Start: 2021-07-02 | End: 2021-07-12 | Stop reason: SDUPTHER

## 2021-07-12 DIAGNOSIS — I10 ESSENTIAL HYPERTENSION: ICD-10-CM

## 2021-07-12 DIAGNOSIS — H11.89 CONJUNCTIVAL IRRITATION: ICD-10-CM

## 2021-07-14 RX ORDER — LOSARTAN POTASSIUM 25 MG/1
12.5 TABLET ORAL DAILY
Qty: 45 TABLET | Refills: 1 | Status: SHIPPED | OUTPATIENT
Start: 2021-07-14 | End: 2021-10-15 | Stop reason: SDUPTHER

## 2021-07-14 RX ORDER — AMLODIPINE BESYLATE 10 MG/1
10 TABLET ORAL DAILY
Qty: 90 TABLET | Refills: 2 | Status: SHIPPED | OUTPATIENT
Start: 2021-07-14 | End: 2021-09-22 | Stop reason: SDUPTHER

## 2021-07-14 RX ORDER — ERYTHROMYCIN 5 MG/G
OINTMENT OPHTHALMIC NIGHTLY
Qty: 3.5 G | Refills: 0 | Status: SHIPPED | OUTPATIENT
Start: 2021-07-14

## 2021-07-26 ENCOUNTER — PATIENT OUTREACH (OUTPATIENT)
Dept: ADMINISTRATIVE | Facility: OTHER | Age: 76
End: 2021-07-26

## 2021-07-29 ENCOUNTER — HOSPITAL ENCOUNTER (OUTPATIENT)
Dept: CARDIOLOGY | Facility: CLINIC | Age: 76
Discharge: HOME | End: 2021-07-29
Payer: MEDICARE

## 2021-07-29 ENCOUNTER — OFFICE VISIT (OUTPATIENT)
Dept: CARDIOLOGY | Facility: CLINIC | Age: 76
End: 2021-07-29
Payer: MEDICARE

## 2021-07-29 VITALS
SYSTOLIC BLOOD PRESSURE: 156 MMHG | HEIGHT: 73 IN | WEIGHT: 219.81 LBS | BODY MASS INDEX: 29.13 KG/M2 | DIASTOLIC BLOOD PRESSURE: 83 MMHG | HEART RATE: 65 BPM

## 2021-07-29 DIAGNOSIS — I10 HYPERTENSION, UNSPECIFIED TYPE: ICD-10-CM

## 2021-07-29 DIAGNOSIS — I10 ESSENTIAL HYPERTENSION: Primary | ICD-10-CM

## 2021-07-29 DIAGNOSIS — I70.0 ATHEROSCLEROSIS OF AORTA: ICD-10-CM

## 2021-07-29 DIAGNOSIS — E78.5 HYPERLIPIDEMIA LDL GOAL <100: ICD-10-CM

## 2021-07-29 DIAGNOSIS — R73.03 BORDERLINE DIABETES MELLITUS: ICD-10-CM

## 2021-07-29 PROCEDURE — 93010 EKG 12-LEAD: ICD-10-PCS | Mod: S$GLB,,, | Performed by: INTERNAL MEDICINE

## 2021-07-29 PROCEDURE — 93005 ELECTROCARDIOGRAM TRACING: CPT | Mod: S$GLB,,, | Performed by: INTERNAL MEDICINE

## 2021-07-29 PROCEDURE — 93010 ELECTROCARDIOGRAM REPORT: CPT | Mod: S$GLB,,, | Performed by: INTERNAL MEDICINE

## 2021-07-29 PROCEDURE — 99203 OFFICE O/P NEW LOW 30 MIN: CPT | Mod: 25,GC,S$GLB, | Performed by: INTERNAL MEDICINE

## 2021-07-29 PROCEDURE — 99203 PR OFFICE/OUTPT VISIT, NEW, LEVL III, 30-44 MIN: ICD-10-PCS | Mod: 25,GC,S$GLB, | Performed by: INTERNAL MEDICINE

## 2021-07-29 PROCEDURE — 99999 PR PBB SHADOW E&M-EST. PATIENT-LVL III: CPT | Mod: PBBFAC,GC,, | Performed by: INTERNAL MEDICINE

## 2021-07-29 PROCEDURE — 99999 PR PBB SHADOW E&M-EST. PATIENT-LVL III: ICD-10-PCS | Mod: PBBFAC,GC,, | Performed by: INTERNAL MEDICINE

## 2021-07-29 PROCEDURE — 93005 EKG 12-LEAD: ICD-10-PCS | Mod: S$GLB,,, | Performed by: INTERNAL MEDICINE

## 2021-07-29 RX ORDER — ATORVASTATIN CALCIUM 20 MG/1
20 TABLET, FILM COATED ORAL DAILY
Qty: 30 TABLET | Refills: 11 | Status: SHIPPED | OUTPATIENT
Start: 2021-07-29 | End: 2021-09-21

## 2021-07-29 RX ORDER — ACETAMINOPHEN 500 MG
1 TABLET ORAL DAILY
COMMUNITY

## 2021-07-29 RX ORDER — AMOXICILLIN 500 MG
CAPSULE ORAL DAILY
COMMUNITY

## 2021-07-30 PROBLEM — K63.5 COLON POLYP: Status: ACTIVE | Noted: 2021-07-30

## 2021-08-04 ENCOUNTER — TELEPHONE (OUTPATIENT)
Dept: CARDIOLOGY | Facility: CLINIC | Age: 76
End: 2021-08-04

## 2021-09-17 ENCOUNTER — TELEPHONE (OUTPATIENT)
Dept: INTERNAL MEDICINE | Facility: CLINIC | Age: 76
End: 2021-09-17

## 2021-09-17 ENCOUNTER — PES CALL (OUTPATIENT)
Dept: ADMINISTRATIVE | Facility: CLINIC | Age: 76
End: 2021-09-17

## 2021-09-20 ENCOUNTER — OFFICE VISIT (OUTPATIENT)
Dept: INTERNAL MEDICINE | Facility: CLINIC | Age: 76
End: 2021-09-20
Payer: MEDICARE

## 2021-09-20 ENCOUNTER — OFFICE VISIT (OUTPATIENT)
Dept: CARDIOLOGY | Facility: CLINIC | Age: 76
End: 2021-09-20
Payer: MEDICARE

## 2021-09-20 ENCOUNTER — LAB VISIT (OUTPATIENT)
Dept: LAB | Facility: HOSPITAL | Age: 76
End: 2021-09-20
Attending: NURSE PRACTITIONER
Payer: MEDICARE

## 2021-09-20 ENCOUNTER — PATIENT OUTREACH (OUTPATIENT)
Dept: ADMINISTRATIVE | Facility: OTHER | Age: 76
End: 2021-09-20

## 2021-09-20 ENCOUNTER — TELEPHONE (OUTPATIENT)
Dept: CARDIOLOGY | Facility: CLINIC | Age: 76
End: 2021-09-20

## 2021-09-20 VITALS
DIASTOLIC BLOOD PRESSURE: 84 MMHG | WEIGHT: 219.56 LBS | BODY MASS INDEX: 29.1 KG/M2 | HEIGHT: 73 IN | OXYGEN SATURATION: 98 % | SYSTOLIC BLOOD PRESSURE: 112 MMHG | HEART RATE: 80 BPM

## 2021-09-20 VITALS
HEART RATE: 75 BPM | WEIGHT: 219.13 LBS | HEIGHT: 73 IN | DIASTOLIC BLOOD PRESSURE: 80 MMHG | SYSTOLIC BLOOD PRESSURE: 142 MMHG | BODY MASS INDEX: 29.04 KG/M2 | OXYGEN SATURATION: 96 %

## 2021-09-20 DIAGNOSIS — I10 ESSENTIAL HYPERTENSION: ICD-10-CM

## 2021-09-20 DIAGNOSIS — K74.01 EARLY HEPATIC FIBROSIS: ICD-10-CM

## 2021-09-20 DIAGNOSIS — E78.5 HYPERLIPIDEMIA LDL GOAL <100: ICD-10-CM

## 2021-09-20 DIAGNOSIS — H91.90 HEARING LOSS, UNSPECIFIED HEARING LOSS TYPE, UNSPECIFIED LATERALITY: ICD-10-CM

## 2021-09-20 DIAGNOSIS — I48.91 NEW ONSET ATRIAL FIBRILLATION: ICD-10-CM

## 2021-09-20 DIAGNOSIS — E66.3 OVERWEIGHT (BMI 25.0-29.9): ICD-10-CM

## 2021-09-20 DIAGNOSIS — R00.2 PALPITATIONS: Primary | ICD-10-CM

## 2021-09-20 DIAGNOSIS — R73.01 IMPAIRED FASTING BLOOD SUGAR: ICD-10-CM

## 2021-09-20 DIAGNOSIS — I27.20 PULMONARY HYPERTENSION: ICD-10-CM

## 2021-09-20 DIAGNOSIS — K63.5 POLYP OF COLON, UNSPECIFIED PART OF COLON, UNSPECIFIED TYPE: ICD-10-CM

## 2021-09-20 DIAGNOSIS — R00.2 PALPITATIONS: ICD-10-CM

## 2021-09-20 DIAGNOSIS — I70.0 ATHEROSCLEROSIS OF AORTA: ICD-10-CM

## 2021-09-20 DIAGNOSIS — I48.91 NEW ONSET ATRIAL FIBRILLATION: Primary | ICD-10-CM

## 2021-09-20 DIAGNOSIS — Z00.00 ENCOUNTER FOR PREVENTIVE HEALTH EXAMINATION: Primary | ICD-10-CM

## 2021-09-20 DIAGNOSIS — I48.91 ATRIAL FIBRILLATION BY ELECTROCARDIOGRAM: ICD-10-CM

## 2021-09-20 DIAGNOSIS — K76.0 FATTY LIVER: ICD-10-CM

## 2021-09-20 DIAGNOSIS — I49.9 IRREGULAR HEART BEAT: ICD-10-CM

## 2021-09-20 DIAGNOSIS — R94.31 ABNORMAL EKG: ICD-10-CM

## 2021-09-20 LAB
ALBUMIN SERPL BCP-MCNC: 4.5 G/DL (ref 3.5–5.2)
ALP SERPL-CCNC: 50 U/L (ref 55–135)
ALT SERPL W/O P-5'-P-CCNC: 40 U/L (ref 10–44)
ANION GAP SERPL CALC-SCNC: 19 MMOL/L (ref 8–16)
AST SERPL-CCNC: 26 U/L (ref 10–40)
BILIRUB SERPL-MCNC: 0.5 MG/DL (ref 0.1–1)
BUN SERPL-MCNC: 16 MG/DL (ref 8–23)
CALCIUM SERPL-MCNC: 10.6 MG/DL (ref 8.7–10.5)
CHLORIDE SERPL-SCNC: 98 MMOL/L (ref 95–110)
CHOLEST SERPL-MCNC: 206 MG/DL (ref 120–199)
CHOLEST/HDLC SERPL: 4 {RATIO} (ref 2–5)
CO2 SERPL-SCNC: 23 MMOL/L (ref 23–29)
CREAT SERPL-MCNC: 1.2 MG/DL (ref 0.5–1.4)
ERYTHROCYTE [DISTWIDTH] IN BLOOD BY AUTOMATED COUNT: 13.4 % (ref 11.5–14.5)
EST. GFR  (AFRICAN AMERICAN): >60 ML/MIN/1.73 M^2
EST. GFR  (NON AFRICAN AMERICAN): 59.2 ML/MIN/1.73 M^2
GLUCOSE SERPL-MCNC: 82 MG/DL (ref 70–110)
HCT VFR BLD AUTO: 48.4 % (ref 40–54)
HDLC SERPL-MCNC: 51 MG/DL (ref 40–75)
HDLC SERPL: 24.8 % (ref 20–50)
HGB BLD-MCNC: 16.1 G/DL (ref 14–18)
LDLC SERPL CALC-MCNC: 119.8 MG/DL (ref 63–159)
MAGNESIUM SERPL-MCNC: 1.8 MG/DL (ref 1.6–2.6)
MCH RBC QN AUTO: 28.6 PG (ref 27–31)
MCHC RBC AUTO-ENTMCNC: 33.3 G/DL (ref 32–36)
MCV RBC AUTO: 86 FL (ref 82–98)
NONHDLC SERPL-MCNC: 155 MG/DL
PLATELET # BLD AUTO: 226 K/UL (ref 150–450)
PMV BLD AUTO: 11.9 FL (ref 9.2–12.9)
POTASSIUM SERPL-SCNC: 4 MMOL/L (ref 3.5–5.1)
PROT SERPL-MCNC: 8 G/DL (ref 6–8.4)
RBC # BLD AUTO: 5.63 M/UL (ref 4.6–6.2)
SODIUM SERPL-SCNC: 140 MMOL/L (ref 136–145)
T4 FREE SERPL-MCNC: 0.85 NG/DL (ref 0.71–1.51)
TRIGL SERPL-MCNC: 176 MG/DL (ref 30–150)
TSH SERPL DL<=0.005 MIU/L-ACNC: 5.51 UIU/ML (ref 0.4–4)
WBC # BLD AUTO: 7.71 K/UL (ref 3.9–12.7)

## 2021-09-20 PROCEDURE — 3008F BODY MASS INDEX DOCD: CPT | Mod: HCNC,CPTII,S$GLB, | Performed by: NURSE PRACTITIONER

## 2021-09-20 PROCEDURE — 99499 RISK ADDL DX/OHS AUDIT: ICD-10-PCS | Mod: HCNC,S$GLB,, | Performed by: NURSE PRACTITIONER

## 2021-09-20 PROCEDURE — 36415 COLL VENOUS BLD VENIPUNCTURE: CPT | Mod: HCNC | Performed by: NURSE PRACTITIONER

## 2021-09-20 PROCEDURE — G0439 PPPS, SUBSEQ VISIT: HCPCS | Mod: HCNC,S$GLB,, | Performed by: NURSE PRACTITIONER

## 2021-09-20 PROCEDURE — 3044F PR MOST RECENT HEMOGLOBIN A1C LEVEL <7.0%: ICD-10-PCS | Mod: HCNC,CPTII,S$GLB, | Performed by: NURSE PRACTITIONER

## 2021-09-20 PROCEDURE — 93000 ELECTROCARDIOGRAM COMPLETE: CPT | Mod: HCNC,S$GLB,, | Performed by: INTERNAL MEDICINE

## 2021-09-20 PROCEDURE — 1126F PR PAIN SEVERITY QUANTIFIED, NO PAIN PRESENT: ICD-10-PCS | Mod: HCNC,CPTII,S$GLB, | Performed by: NURSE PRACTITIONER

## 2021-09-20 PROCEDURE — 84443 ASSAY THYROID STIM HORMONE: CPT | Mod: HCNC | Performed by: NURSE PRACTITIONER

## 2021-09-20 PROCEDURE — 99214 OFFICE O/P EST MOD 30 MIN: CPT | Mod: HCNC,25,S$GLB, | Performed by: NURSE PRACTITIONER

## 2021-09-20 PROCEDURE — 1159F MED LIST DOCD IN RCRD: CPT | Mod: HCNC,CPTII,S$GLB, | Performed by: NURSE PRACTITIONER

## 2021-09-20 PROCEDURE — 3008F PR BODY MASS INDEX (BMI) DOCUMENTED: ICD-10-PCS | Mod: HCNC,CPTII,S$GLB, | Performed by: NURSE PRACTITIONER

## 2021-09-20 PROCEDURE — 1126F AMNT PAIN NOTED NONE PRSNT: CPT | Mod: HCNC,CPTII,S$GLB, | Performed by: NURSE PRACTITIONER

## 2021-09-20 PROCEDURE — 99999 PR PBB SHADOW E&M-EST. PATIENT-LVL V: ICD-10-PCS | Mod: PBBFAC,HCNC,, | Performed by: NURSE PRACTITIONER

## 2021-09-20 PROCEDURE — 3079F DIAST BP 80-89 MM HG: CPT | Mod: HCNC,CPTII,S$GLB, | Performed by: NURSE PRACTITIONER

## 2021-09-20 PROCEDURE — 83735 ASSAY OF MAGNESIUM: CPT | Mod: HCNC | Performed by: NURSE PRACTITIONER

## 2021-09-20 PROCEDURE — 1101F PT FALLS ASSESS-DOCD LE1/YR: CPT | Mod: HCNC,CPTII,S$GLB, | Performed by: NURSE PRACTITIONER

## 2021-09-20 PROCEDURE — 84439 ASSAY OF FREE THYROXINE: CPT | Mod: HCNC | Performed by: NURSE PRACTITIONER

## 2021-09-20 PROCEDURE — 3044F HG A1C LEVEL LT 7.0%: CPT | Mod: HCNC,CPTII,S$GLB, | Performed by: NURSE PRACTITIONER

## 2021-09-20 PROCEDURE — 99499 UNLISTED E&M SERVICE: CPT | Mod: HCNC,S$GLB,, | Performed by: NURSE PRACTITIONER

## 2021-09-20 PROCEDURE — 80061 LIPID PANEL: CPT | Mod: HCNC | Performed by: NURSE PRACTITIONER

## 2021-09-20 PROCEDURE — 4010F ACE/ARB THERAPY RXD/TAKEN: CPT | Mod: HCNC,CPTII,S$GLB, | Performed by: NURSE PRACTITIONER

## 2021-09-20 PROCEDURE — 1160F RVW MEDS BY RX/DR IN RCRD: CPT | Mod: HCNC,CPTII,S$GLB, | Performed by: NURSE PRACTITIONER

## 2021-09-20 PROCEDURE — 99999 PR PBB SHADOW E&M-EST. PATIENT-LVL IV: CPT | Mod: PBBFAC,HCNC,, | Performed by: NURSE PRACTITIONER

## 2021-09-20 PROCEDURE — 93000 ELECTROCARDIOGRAM COMPLETE: CPT | Mod: 77,HCNC,S$GLB, | Performed by: INTERNAL MEDICINE

## 2021-09-20 PROCEDURE — 3077F SYST BP >= 140 MM HG: CPT | Mod: HCNC,CPTII,S$GLB, | Performed by: NURSE PRACTITIONER

## 2021-09-20 PROCEDURE — 3288F PR FALLS RISK ASSESSMENT DOCUMENTED: ICD-10-PCS | Mod: HCNC,CPTII,S$GLB, | Performed by: NURSE PRACTITIONER

## 2021-09-20 PROCEDURE — 4010F PR ACE/ARB THEARPY RXD/TAKEN: ICD-10-PCS | Mod: HCNC,CPTII,S$GLB, | Performed by: NURSE PRACTITIONER

## 2021-09-20 PROCEDURE — 1159F PR MEDICATION LIST DOCUMENTED IN MEDICAL RECORD: ICD-10-PCS | Mod: HCNC,CPTII,S$GLB, | Performed by: NURSE PRACTITIONER

## 2021-09-20 PROCEDURE — 99214 PR OFFICE/OUTPT VISIT, EST, LEVL IV, 30-39 MIN: ICD-10-PCS | Mod: HCNC,25,S$GLB, | Performed by: NURSE PRACTITIONER

## 2021-09-20 PROCEDURE — 1160F PR REVIEW ALL MEDS BY PRESCRIBER/CLIN PHARMACIST DOCUMENTED: ICD-10-PCS | Mod: HCNC,CPTII,S$GLB, | Performed by: NURSE PRACTITIONER

## 2021-09-20 PROCEDURE — 1101F PR PT FALLS ASSESS DOC 0-1 FALLS W/OUT INJ PAST YR: ICD-10-PCS | Mod: HCNC,CPTII,S$GLB, | Performed by: NURSE PRACTITIONER

## 2021-09-20 PROCEDURE — 3077F PR MOST RECENT SYSTOLIC BLOOD PRESSURE >= 140 MM HG: ICD-10-PCS | Mod: HCNC,CPTII,S$GLB, | Performed by: NURSE PRACTITIONER

## 2021-09-20 PROCEDURE — 85027 COMPLETE CBC AUTOMATED: CPT | Mod: HCNC | Performed by: NURSE PRACTITIONER

## 2021-09-20 PROCEDURE — 80053 COMPREHEN METABOLIC PANEL: CPT | Mod: HCNC | Performed by: NURSE PRACTITIONER

## 2021-09-20 PROCEDURE — 93000 EKG 12-LEAD: ICD-10-PCS | Mod: 77,HCNC,S$GLB, | Performed by: INTERNAL MEDICINE

## 2021-09-20 PROCEDURE — 3079F PR MOST RECENT DIASTOLIC BLOOD PRESSURE 80-89 MM HG: ICD-10-PCS | Mod: HCNC,CPTII,S$GLB, | Performed by: NURSE PRACTITIONER

## 2021-09-20 PROCEDURE — 3074F SYST BP LT 130 MM HG: CPT | Mod: HCNC,CPTII,S$GLB, | Performed by: NURSE PRACTITIONER

## 2021-09-20 PROCEDURE — 3074F PR MOST RECENT SYSTOLIC BLOOD PRESSURE < 130 MM HG: ICD-10-PCS | Mod: HCNC,CPTII,S$GLB, | Performed by: NURSE PRACTITIONER

## 2021-09-20 PROCEDURE — 3288F FALL RISK ASSESSMENT DOCD: CPT | Mod: HCNC,CPTII,S$GLB, | Performed by: NURSE PRACTITIONER

## 2021-09-20 PROCEDURE — 93000 EKG 12-LEAD: ICD-10-PCS | Mod: HCNC,S$GLB,, | Performed by: INTERNAL MEDICINE

## 2021-09-20 PROCEDURE — 99999 PR PBB SHADOW E&M-EST. PATIENT-LVL IV: ICD-10-PCS | Mod: PBBFAC,HCNC,, | Performed by: NURSE PRACTITIONER

## 2021-09-20 PROCEDURE — 99999 PR PBB SHADOW E&M-EST. PATIENT-LVL V: CPT | Mod: PBBFAC,HCNC,, | Performed by: NURSE PRACTITIONER

## 2021-09-20 PROCEDURE — G0439 PR MEDICARE ANNUAL WELLNESS SUBSEQUENT VISIT: ICD-10-PCS | Mod: HCNC,S$GLB,, | Performed by: NURSE PRACTITIONER

## 2021-09-20 RX ORDER — METOPROLOL SUCCINATE 25 MG/1
25 TABLET, EXTENDED RELEASE ORAL DAILY
Qty: 30 TABLET | Refills: 11 | Status: SHIPPED | OUTPATIENT
Start: 2021-09-20 | End: 2021-09-29 | Stop reason: SDUPTHER

## 2021-09-21 ENCOUNTER — TELEPHONE (OUTPATIENT)
Dept: CARDIOLOGY | Facility: CLINIC | Age: 76
End: 2021-09-21

## 2021-09-21 ENCOUNTER — PATIENT MESSAGE (OUTPATIENT)
Dept: CARDIOLOGY | Facility: CLINIC | Age: 76
End: 2021-09-21

## 2021-09-21 DIAGNOSIS — E78.5 HYPERLIPIDEMIA LDL GOAL <100: Primary | ICD-10-CM

## 2021-09-21 DIAGNOSIS — I70.0 ATHEROSCLEROSIS OF AORTA: ICD-10-CM

## 2021-09-21 RX ORDER — ATORVASTATIN CALCIUM 40 MG/1
40 TABLET, FILM COATED ORAL DAILY
Qty: 30 TABLET | Refills: 11 | Status: SHIPPED | OUTPATIENT
Start: 2021-09-21 | End: 2021-09-29 | Stop reason: SDUPTHER

## 2021-09-22 DIAGNOSIS — I48.91 NEW ONSET ATRIAL FIBRILLATION: ICD-10-CM

## 2021-09-22 DIAGNOSIS — I10 ESSENTIAL HYPERTENSION: ICD-10-CM

## 2021-09-22 RX ORDER — AMLODIPINE BESYLATE 10 MG/1
10 TABLET ORAL DAILY
Qty: 90 TABLET | Refills: 2 | Status: SHIPPED | OUTPATIENT
Start: 2021-09-22 | End: 2021-10-20 | Stop reason: SDUPTHER

## 2021-09-24 ENCOUNTER — HOSPITAL ENCOUNTER (OUTPATIENT)
Dept: CARDIOLOGY | Facility: HOSPITAL | Age: 76
Discharge: HOME | End: 2021-09-24
Attending: NURSE PRACTITIONER
Payer: MEDICARE

## 2021-09-24 ENCOUNTER — OFFICE VISIT (OUTPATIENT)
Dept: SLEEP MEDICINE | Facility: CLINIC | Age: 76
End: 2021-09-24
Payer: MEDICARE

## 2021-09-24 VITALS
HEART RATE: 65 BPM | DIASTOLIC BLOOD PRESSURE: 71 MMHG | SYSTOLIC BLOOD PRESSURE: 154 MMHG | BODY MASS INDEX: 29.54 KG/M2 | WEIGHT: 222.88 LBS | HEIGHT: 73 IN

## 2021-09-24 VITALS
DIASTOLIC BLOOD PRESSURE: 78 MMHG | SYSTOLIC BLOOD PRESSURE: 140 MMHG | HEART RATE: 73 BPM | BODY MASS INDEX: 29.03 KG/M2 | HEIGHT: 73 IN | WEIGHT: 219 LBS

## 2021-09-24 DIAGNOSIS — G47.10 PERSISTENT DISORDER OF INITIATING OR MAINTAINING WAKEFULNESS: ICD-10-CM

## 2021-09-24 DIAGNOSIS — R06.83 SNORING: Primary | ICD-10-CM

## 2021-09-24 DIAGNOSIS — I48.91 NEW ONSET ATRIAL FIBRILLATION: ICD-10-CM

## 2021-09-24 DIAGNOSIS — I27.20 PULMONARY HYPERTENSION: ICD-10-CM

## 2021-09-24 DIAGNOSIS — E66.3 OVERWEIGHT (BMI 25.0-29.9): ICD-10-CM

## 2021-09-24 LAB
ASCENDING AORTA: 3.11 CM
AV INDEX (PROSTH): 0.78
AV MEAN GRADIENT: 5 MMHG
AV PEAK GRADIENT: 9 MMHG
AV VALVE AREA: 3.45 CM2
AV VELOCITY RATIO: 0.75
BSA FOR ECHO PROCEDURE: 2.26 M2
CV ECHO LV RWT: 0.45 CM
DOP CALC AO PEAK VEL: 1.47 M/S
DOP CALC AO VTI: 32 CM
DOP CALC LVOT AREA: 4.4 CM2
DOP CALC LVOT DIAMETER: 2.38 CM
DOP CALC LVOT PEAK VEL: 1.1 M/S
DOP CALC LVOT STROKE VOLUME: 110.41 CM3
DOP CALCLVOT PEAK VEL VTI: 24.83 CM
E WAVE DECELERATION TIME: 251.59 MSEC
E/A RATIO: 1.11
E/E' RATIO: 9.47 M/S
ECHO LV POSTERIOR WALL: 1.08 CM (ref 0.6–1.1)
EJECTION FRACTION: 60 %
FRACTIONAL SHORTENING: 44 % (ref 28–44)
INTERVENTRICULAR SEPTUM: 1.02 CM (ref 0.6–1.1)
LA MAJOR: 5.46 CM
LA MINOR: 5.36 CM
LA WIDTH: 3.92 CM
LEFT ATRIUM SIZE: 3.68 CM
LEFT ATRIUM VOLUME INDEX MOD: 26.1 ML/M2
LEFT ATRIUM VOLUME INDEX: 29.6 ML/M2
LEFT ATRIUM VOLUME MOD: 58.53 CM3
LEFT ATRIUM VOLUME: 66.33 CM3
LEFT INTERNAL DIMENSION IN SYSTOLE: 2.7 CM (ref 2.1–4)
LEFT VENTRICLE DIASTOLIC VOLUME INDEX: 47.46 ML/M2
LEFT VENTRICLE DIASTOLIC VOLUME: 106.32 ML
LEFT VENTRICLE MASS INDEX: 81 G/M2
LEFT VENTRICLE SYSTOLIC VOLUME INDEX: 12 ML/M2
LEFT VENTRICLE SYSTOLIC VOLUME: 26.98 ML
LEFT VENTRICULAR INTERNAL DIMENSION IN DIASTOLE: 4.78 CM (ref 3.5–6)
LEFT VENTRICULAR MASS: 180.68 G
LV LATERAL E/E' RATIO: 7.89 M/S
LV SEPTAL E/E' RATIO: 11.83 M/S
MV A" WAVE DURATION": 10.28 MSEC
MV PEAK A VEL: 0.64 M/S
MV PEAK E VEL: 0.71 M/S
MV STENOSIS PRESSURE HALF TIME: 72.96 MS
MV VALVE AREA P 1/2 METHOD: 3.02 CM2
PISA TR MAX VEL: 2.7 M/S
PULM VEIN S/D RATIO: 1.06
PV PEAK D VEL: 0.62 M/S
PV PEAK S VEL: 0.66 M/S
RA MAJOR: 5.4 CM
RA PRESSURE: 3 MMHG
RA WIDTH: 3.7 CM
RIGHT VENTRICULAR END-DIASTOLIC DIMENSION: 4.31 CM
RV TISSUE DOPPLER FREE WALL SYSTOLIC VELOCITY 1 (APICAL 4 CHAMBER VIEW): 10.37 CM/S
SINUS: 3.21 CM
STJ: 2.96 CM
TDI LATERAL: 0.09 M/S
TDI SEPTAL: 0.06 M/S
TDI: 0.08 M/S
TR MAX PG: 29 MMHG
TRICUSPID ANNULAR PLANE SYSTOLIC EXCURSION: 2.01 CM
TV REST PULMONARY ARTERY PRESSURE: 32 MMHG

## 2021-09-24 PROCEDURE — 1126F AMNT PAIN NOTED NONE PRSNT: CPT | Mod: HCNC,CPTII,S$GLB, | Performed by: INTERNAL MEDICINE

## 2021-09-24 PROCEDURE — 99204 PR OFFICE/OUTPT VISIT, NEW, LEVL IV, 45-59 MIN: ICD-10-PCS | Mod: HCNC,S$GLB,, | Performed by: INTERNAL MEDICINE

## 2021-09-24 PROCEDURE — 3077F PR MOST RECENT SYSTOLIC BLOOD PRESSURE >= 140 MM HG: ICD-10-PCS | Mod: HCNC,CPTII,S$GLB, | Performed by: INTERNAL MEDICINE

## 2021-09-24 PROCEDURE — 1159F MED LIST DOCD IN RCRD: CPT | Mod: HCNC,CPTII,S$GLB, | Performed by: INTERNAL MEDICINE

## 2021-09-24 PROCEDURE — 1101F PT FALLS ASSESS-DOCD LE1/YR: CPT | Mod: HCNC,CPTII,S$GLB, | Performed by: INTERNAL MEDICINE

## 2021-09-24 PROCEDURE — 4010F ACE/ARB THERAPY RXD/TAKEN: CPT | Mod: HCNC,CPTII,S$GLB, | Performed by: INTERNAL MEDICINE

## 2021-09-24 PROCEDURE — 3077F SYST BP >= 140 MM HG: CPT | Mod: HCNC,CPTII,S$GLB, | Performed by: INTERNAL MEDICINE

## 2021-09-24 PROCEDURE — 99999 PR PBB SHADOW E&M-EST. PATIENT-LVL IV: CPT | Mod: PBBFAC,HCNC,, | Performed by: INTERNAL MEDICINE

## 2021-09-24 PROCEDURE — 1101F PR PT FALLS ASSESS DOC 0-1 FALLS W/OUT INJ PAST YR: ICD-10-PCS | Mod: HCNC,CPTII,S$GLB, | Performed by: INTERNAL MEDICINE

## 2021-09-24 PROCEDURE — 99204 OFFICE O/P NEW MOD 45 MIN: CPT | Mod: HCNC,S$GLB,, | Performed by: INTERNAL MEDICINE

## 2021-09-24 PROCEDURE — 93306 TTE W/DOPPLER COMPLETE: CPT | Mod: 26,HCNC,, | Performed by: INTERNAL MEDICINE

## 2021-09-24 PROCEDURE — 93306 ECHO (CUPID ONLY): ICD-10-PCS | Mod: 26,HCNC,, | Performed by: INTERNAL MEDICINE

## 2021-09-24 PROCEDURE — 4010F PR ACE/ARB THEARPY RXD/TAKEN: ICD-10-PCS | Mod: HCNC,CPTII,S$GLB, | Performed by: INTERNAL MEDICINE

## 2021-09-24 PROCEDURE — 3288F FALL RISK ASSESSMENT DOCD: CPT | Mod: HCNC,CPTII,S$GLB, | Performed by: INTERNAL MEDICINE

## 2021-09-24 PROCEDURE — 3078F DIAST BP <80 MM HG: CPT | Mod: HCNC,CPTII,S$GLB, | Performed by: INTERNAL MEDICINE

## 2021-09-24 PROCEDURE — 3008F PR BODY MASS INDEX (BMI) DOCUMENTED: ICD-10-PCS | Mod: HCNC,CPTII,S$GLB, | Performed by: INTERNAL MEDICINE

## 2021-09-24 PROCEDURE — 99999 PR PBB SHADOW E&M-EST. PATIENT-LVL IV: ICD-10-PCS | Mod: PBBFAC,HCNC,, | Performed by: INTERNAL MEDICINE

## 2021-09-24 PROCEDURE — 3008F BODY MASS INDEX DOCD: CPT | Mod: HCNC,CPTII,S$GLB, | Performed by: INTERNAL MEDICINE

## 2021-09-24 PROCEDURE — 3044F PR MOST RECENT HEMOGLOBIN A1C LEVEL <7.0%: ICD-10-PCS | Mod: HCNC,CPTII,S$GLB, | Performed by: INTERNAL MEDICINE

## 2021-09-24 PROCEDURE — 3044F HG A1C LEVEL LT 7.0%: CPT | Mod: HCNC,CPTII,S$GLB, | Performed by: INTERNAL MEDICINE

## 2021-09-24 PROCEDURE — 1159F PR MEDICATION LIST DOCUMENTED IN MEDICAL RECORD: ICD-10-PCS | Mod: HCNC,CPTII,S$GLB, | Performed by: INTERNAL MEDICINE

## 2021-09-24 PROCEDURE — 3288F PR FALLS RISK ASSESSMENT DOCUMENTED: ICD-10-PCS | Mod: HCNC,CPTII,S$GLB, | Performed by: INTERNAL MEDICINE

## 2021-09-24 PROCEDURE — 3078F PR MOST RECENT DIASTOLIC BLOOD PRESSURE < 80 MM HG: ICD-10-PCS | Mod: HCNC,CPTII,S$GLB, | Performed by: INTERNAL MEDICINE

## 2021-09-24 PROCEDURE — 1126F PR PAIN SEVERITY QUANTIFIED, NO PAIN PRESENT: ICD-10-PCS | Mod: HCNC,CPTII,S$GLB, | Performed by: INTERNAL MEDICINE

## 2021-09-24 PROCEDURE — 93306 TTE W/DOPPLER COMPLETE: CPT | Mod: HCNC

## 2021-09-27 ENCOUNTER — PATIENT MESSAGE (OUTPATIENT)
Dept: CARDIOLOGY | Facility: CLINIC | Age: 76
End: 2021-09-27

## 2021-09-29 DIAGNOSIS — I70.0 ATHEROSCLEROSIS OF AORTA: ICD-10-CM

## 2021-09-29 DIAGNOSIS — I48.91 NEW ONSET ATRIAL FIBRILLATION: ICD-10-CM

## 2021-09-29 DIAGNOSIS — E78.5 HYPERLIPIDEMIA LDL GOAL <100: ICD-10-CM

## 2021-09-29 RX ORDER — ATORVASTATIN CALCIUM 40 MG/1
40 TABLET, FILM COATED ORAL DAILY
Qty: 90 TABLET | Refills: 3 | Status: SHIPPED | OUTPATIENT
Start: 2021-09-29 | End: 2021-12-29 | Stop reason: SDUPTHER

## 2021-09-29 RX ORDER — METOPROLOL SUCCINATE 25 MG/1
25 TABLET, EXTENDED RELEASE ORAL DAILY
Qty: 90 TABLET | Refills: 3 | Status: SHIPPED | OUTPATIENT
Start: 2021-09-29 | End: 2021-12-29 | Stop reason: SDUPTHER

## 2021-09-30 ENCOUNTER — PES CALL (OUTPATIENT)
Dept: ADMINISTRATIVE | Facility: CLINIC | Age: 76
End: 2021-09-30

## 2021-10-11 ENCOUNTER — TELEPHONE (OUTPATIENT)
Dept: CARDIOLOGY | Facility: CLINIC | Age: 76
End: 2021-10-11

## 2021-10-11 DIAGNOSIS — I48.0 PAROXYSMAL ATRIAL FIBRILLATION: Primary | ICD-10-CM

## 2021-10-15 ENCOUNTER — TELEPHONE (OUTPATIENT)
Dept: ELECTROPHYSIOLOGY | Facility: CLINIC | Age: 76
End: 2021-10-15

## 2021-10-15 DIAGNOSIS — I49.8 OTHER SPECIFIED CARDIAC ARRHYTHMIAS: Primary | ICD-10-CM

## 2021-10-15 DIAGNOSIS — I10 ESSENTIAL HYPERTENSION: ICD-10-CM

## 2021-10-15 RX ORDER — LOSARTAN POTASSIUM 25 MG/1
12.5 TABLET ORAL DAILY
Qty: 45 TABLET | Refills: 1 | Status: SHIPPED | OUTPATIENT
Start: 2021-10-15 | End: 2021-10-20 | Stop reason: SDUPTHER

## 2021-10-19 ENCOUNTER — CLINICAL SUPPORT (OUTPATIENT)
Dept: CARDIOLOGY | Facility: HOSPITAL | Age: 76
End: 2021-10-19
Attending: NURSE PRACTITIONER
Payer: MEDICARE

## 2021-10-19 DIAGNOSIS — I48.0 PAROXYSMAL ATRIAL FIBRILLATION: ICD-10-CM

## 2021-10-19 PROCEDURE — 93227 HOLTER MONITOR - 48 HOUR (CUPID ONLY): ICD-10-PCS | Mod: HCNC,,, | Performed by: STUDENT IN AN ORGANIZED HEALTH CARE EDUCATION/TRAINING PROGRAM

## 2021-10-19 PROCEDURE — 93226 XTRNL ECG REC<48 HR SCAN A/R: CPT | Mod: HCNC

## 2021-10-19 PROCEDURE — 93227 XTRNL ECG REC<48 HR R&I: CPT | Mod: HCNC,,, | Performed by: STUDENT IN AN ORGANIZED HEALTH CARE EDUCATION/TRAINING PROGRAM

## 2021-10-23 ENCOUNTER — PATIENT OUTREACH (OUTPATIENT)
Dept: ADMINISTRATIVE | Facility: OTHER | Age: 76
End: 2021-10-23
Payer: MEDICARE

## 2021-10-25 ENCOUNTER — OFFICE VISIT (OUTPATIENT)
Dept: ELECTROPHYSIOLOGY | Facility: CLINIC | Age: 76
End: 2021-10-25
Payer: MEDICARE

## 2021-10-25 ENCOUNTER — HOSPITAL ENCOUNTER (OUTPATIENT)
Dept: CARDIOLOGY | Facility: CLINIC | Age: 76
Discharge: HOME | End: 2021-10-25
Payer: MEDICARE

## 2021-10-25 VITALS
BODY MASS INDEX: 28.75 KG/M2 | HEART RATE: 60 BPM | WEIGHT: 216.94 LBS | DIASTOLIC BLOOD PRESSURE: 84 MMHG | HEIGHT: 73 IN | SYSTOLIC BLOOD PRESSURE: 119 MMHG

## 2021-10-25 DIAGNOSIS — I49.8 OTHER SPECIFIED CARDIAC ARRHYTHMIAS: ICD-10-CM

## 2021-10-25 DIAGNOSIS — I48.0 PAROXYSMAL ATRIAL FIBRILLATION: ICD-10-CM

## 2021-10-25 PROCEDURE — 93010 RHYTHM STRIP: ICD-10-PCS | Mod: HCNC,S$GLB,, | Performed by: INTERNAL MEDICINE

## 2021-10-25 PROCEDURE — 3074F PR MOST RECENT SYSTOLIC BLOOD PRESSURE < 130 MM HG: ICD-10-PCS | Mod: HCNC,CPTII,S$GLB, | Performed by: INTERNAL MEDICINE

## 2021-10-25 PROCEDURE — 1126F PR PAIN SEVERITY QUANTIFIED, NO PAIN PRESENT: ICD-10-PCS | Mod: HCNC,CPTII,S$GLB, | Performed by: INTERNAL MEDICINE

## 2021-10-25 PROCEDURE — 1159F MED LIST DOCD IN RCRD: CPT | Mod: HCNC,CPTII,S$GLB, | Performed by: INTERNAL MEDICINE

## 2021-10-25 PROCEDURE — 1159F PR MEDICATION LIST DOCUMENTED IN MEDICAL RECORD: ICD-10-PCS | Mod: HCNC,CPTII,S$GLB, | Performed by: INTERNAL MEDICINE

## 2021-10-25 PROCEDURE — 3044F PR MOST RECENT HEMOGLOBIN A1C LEVEL <7.0%: ICD-10-PCS | Mod: HCNC,CPTII,S$GLB, | Performed by: INTERNAL MEDICINE

## 2021-10-25 PROCEDURE — 1101F PT FALLS ASSESS-DOCD LE1/YR: CPT | Mod: HCNC,CPTII,S$GLB, | Performed by: INTERNAL MEDICINE

## 2021-10-25 PROCEDURE — 3288F PR FALLS RISK ASSESSMENT DOCUMENTED: ICD-10-PCS | Mod: HCNC,CPTII,S$GLB, | Performed by: INTERNAL MEDICINE

## 2021-10-25 PROCEDURE — 3074F SYST BP LT 130 MM HG: CPT | Mod: HCNC,CPTII,S$GLB, | Performed by: INTERNAL MEDICINE

## 2021-10-25 PROCEDURE — 4010F PR ACE/ARB THEARPY RXD/TAKEN: ICD-10-PCS | Mod: HCNC,CPTII,S$GLB, | Performed by: INTERNAL MEDICINE

## 2021-10-25 PROCEDURE — 3288F FALL RISK ASSESSMENT DOCD: CPT | Mod: HCNC,CPTII,S$GLB, | Performed by: INTERNAL MEDICINE

## 2021-10-25 PROCEDURE — 3008F PR BODY MASS INDEX (BMI) DOCUMENTED: ICD-10-PCS | Mod: HCNC,CPTII,S$GLB, | Performed by: INTERNAL MEDICINE

## 2021-10-25 PROCEDURE — 3008F BODY MASS INDEX DOCD: CPT | Mod: HCNC,CPTII,S$GLB, | Performed by: INTERNAL MEDICINE

## 2021-10-25 PROCEDURE — 93005 RHYTHM STRIP: ICD-10-PCS | Mod: HCNC,S$GLB,, | Performed by: INTERNAL MEDICINE

## 2021-10-25 PROCEDURE — 93010 ELECTROCARDIOGRAM REPORT: CPT | Mod: HCNC,S$GLB,, | Performed by: INTERNAL MEDICINE

## 2021-10-25 PROCEDURE — 3079F PR MOST RECENT DIASTOLIC BLOOD PRESSURE 80-89 MM HG: ICD-10-PCS | Mod: HCNC,CPTII,S$GLB, | Performed by: INTERNAL MEDICINE

## 2021-10-25 PROCEDURE — 3079F DIAST BP 80-89 MM HG: CPT | Mod: HCNC,CPTII,S$GLB, | Performed by: INTERNAL MEDICINE

## 2021-10-25 PROCEDURE — 99205 PR OFFICE/OUTPT VISIT, NEW, LEVL V, 60-74 MIN: ICD-10-PCS | Mod: HCNC,S$GLB,, | Performed by: INTERNAL MEDICINE

## 2021-10-25 PROCEDURE — 99205 OFFICE O/P NEW HI 60 MIN: CPT | Mod: HCNC,S$GLB,, | Performed by: INTERNAL MEDICINE

## 2021-10-25 PROCEDURE — 99999 PR PBB SHADOW E&M-EST. PATIENT-LVL III: CPT | Mod: PBBFAC,HCNC,, | Performed by: INTERNAL MEDICINE

## 2021-10-25 PROCEDURE — 3044F HG A1C LEVEL LT 7.0%: CPT | Mod: HCNC,CPTII,S$GLB, | Performed by: INTERNAL MEDICINE

## 2021-10-25 PROCEDURE — 99999 PR PBB SHADOW E&M-EST. PATIENT-LVL III: ICD-10-PCS | Mod: PBBFAC,HCNC,, | Performed by: INTERNAL MEDICINE

## 2021-10-25 PROCEDURE — 4010F ACE/ARB THERAPY RXD/TAKEN: CPT | Mod: HCNC,CPTII,S$GLB, | Performed by: INTERNAL MEDICINE

## 2021-10-25 PROCEDURE — 93005 ELECTROCARDIOGRAM TRACING: CPT | Mod: HCNC,S$GLB,, | Performed by: INTERNAL MEDICINE

## 2021-10-25 PROCEDURE — 1126F AMNT PAIN NOTED NONE PRSNT: CPT | Mod: HCNC,CPTII,S$GLB, | Performed by: INTERNAL MEDICINE

## 2021-10-25 PROCEDURE — 1101F PR PT FALLS ASSESS DOC 0-1 FALLS W/OUT INJ PAST YR: ICD-10-PCS | Mod: HCNC,CPTII,S$GLB, | Performed by: INTERNAL MEDICINE

## 2021-10-26 LAB
OHS CV EVENT MONITOR DAY: 0
OHS CV HOLTER LENGTH DECIMAL HOURS: 48
OHS CV HOLTER LENGTH HOURS: 48
OHS CV HOLTER LENGTH MINUTES: 0
OHS CV HOLTER SINUS AVERAGE HR: 72
OHS CV HOLTER SINUS MAX HR: 98
OHS CV HOLTER SINUS MIN HR: 56

## 2021-10-27 ENCOUNTER — HOSPITAL ENCOUNTER (EMERGENCY)
Facility: HOSPITAL | Age: 76
Discharge: HOME | End: 2021-10-27
Attending: EMERGENCY MEDICINE
Payer: MEDICARE

## 2021-10-27 VITALS
DIASTOLIC BLOOD PRESSURE: 79 MMHG | SYSTOLIC BLOOD PRESSURE: 170 MMHG | OXYGEN SATURATION: 99 % | WEIGHT: 216 LBS | RESPIRATION RATE: 20 BRPM | HEART RATE: 75 BPM | TEMPERATURE: 98 F | BODY MASS INDEX: 28.5 KG/M2

## 2021-10-27 DIAGNOSIS — N23 RENAL COLIC ON RIGHT SIDE: Primary | ICD-10-CM

## 2021-10-27 LAB
ALBUMIN SERPL BCP-MCNC: 4.6 G/DL (ref 3.5–5.2)
ALP SERPL-CCNC: 49 U/L (ref 55–135)
ALT SERPL W/O P-5'-P-CCNC: 26 U/L (ref 10–44)
ANION GAP SERPL CALC-SCNC: 13 MMOL/L (ref 8–16)
AST SERPL-CCNC: 25 U/L (ref 10–40)
BASOPHILS # BLD AUTO: 0.04 K/UL (ref 0–0.2)
BASOPHILS NFR BLD: 0.4 % (ref 0–1.9)
BILIRUB SERPL-MCNC: 0.7 MG/DL (ref 0.1–1)
BILIRUB UR QL STRIP: NEGATIVE
BUN SERPL-MCNC: 19 MG/DL (ref 8–23)
BUN SERPL-MCNC: 21 MG/DL (ref 6–30)
CALCIUM SERPL-MCNC: 10.1 MG/DL (ref 8.7–10.5)
CHLORIDE SERPL-SCNC: 98 MMOL/L (ref 95–110)
CHLORIDE SERPL-SCNC: 99 MMOL/L (ref 95–110)
CLARITY UR REFRACT.AUTO: CLEAR
CO2 SERPL-SCNC: 26 MMOL/L (ref 23–29)
COLOR UR AUTO: YELLOW
CREAT SERPL-MCNC: 1.3 MG/DL (ref 0.5–1.4)
CREAT SERPL-MCNC: 1.3 MG/DL (ref 0.5–1.4)
DIFFERENTIAL METHOD: ABNORMAL
EOSINOPHIL # BLD AUTO: 0 K/UL (ref 0–0.5)
EOSINOPHIL NFR BLD: 0.3 % (ref 0–8)
ERYTHROCYTE [DISTWIDTH] IN BLOOD BY AUTOMATED COUNT: 13.2 % (ref 11.5–14.5)
EST. GFR  (AFRICAN AMERICAN): >60 ML/MIN/1.73 M^2
EST. GFR  (NON AFRICAN AMERICAN): 53.4 ML/MIN/1.73 M^2
GLUCOSE SERPL-MCNC: 179 MG/DL (ref 70–110)
GLUCOSE SERPL-MCNC: 188 MG/DL (ref 70–110)
GLUCOSE UR QL STRIP: ABNORMAL
HCT VFR BLD AUTO: 41.9 % (ref 40–54)
HCT VFR BLD CALC: 43 %PCV (ref 36–54)
HGB BLD-MCNC: 14.4 G/DL (ref 14–18)
HGB UR QL STRIP: ABNORMAL
IMM GRANULOCYTES # BLD AUTO: 0.05 K/UL (ref 0–0.04)
IMM GRANULOCYTES NFR BLD AUTO: 0.5 % (ref 0–0.5)
KETONES UR QL STRIP: ABNORMAL
LEUKOCYTE ESTERASE UR QL STRIP: NEGATIVE
LYMPHOCYTES # BLD AUTO: 0.9 K/UL (ref 1–4.8)
LYMPHOCYTES NFR BLD: 8.9 % (ref 18–48)
MCH RBC QN AUTO: 29.3 PG (ref 27–31)
MCHC RBC AUTO-ENTMCNC: 34.4 G/DL (ref 32–36)
MCV RBC AUTO: 85 FL (ref 82–98)
MICROSCOPIC COMMENT: ABNORMAL
MONOCYTES # BLD AUTO: 0.5 K/UL (ref 0.3–1)
MONOCYTES NFR BLD: 4.3 % (ref 4–15)
NEUTROPHILS # BLD AUTO: 9 K/UL (ref 1.8–7.7)
NEUTROPHILS NFR BLD: 85.6 % (ref 38–73)
NITRITE UR QL STRIP: NEGATIVE
NRBC BLD-RTO: 0 /100 WBC
PH UR STRIP: 6 [PH] (ref 5–8)
PLATELET # BLD AUTO: 188 K/UL (ref 150–450)
PMV BLD AUTO: 11.6 FL (ref 9.2–12.9)
POC IONIZED CALCIUM: 1.12 MMOL/L (ref 1.06–1.42)
POC TCO2 (MEASURED): 27 MMOL/L (ref 23–29)
POTASSIUM BLD-SCNC: 3.4 MMOL/L (ref 3.5–5.1)
POTASSIUM SERPL-SCNC: 3.3 MMOL/L (ref 3.5–5.1)
PROT SERPL-MCNC: 8.4 G/DL (ref 6–8.4)
PROT UR QL STRIP: NEGATIVE
RBC # BLD AUTO: 4.91 M/UL (ref 4.6–6.2)
RBC #/AREA URNS AUTO: 13 /HPF (ref 0–4)
SAMPLE: ABNORMAL
SODIUM BLD-SCNC: 140 MMOL/L (ref 136–145)
SODIUM SERPL-SCNC: 138 MMOL/L (ref 136–145)
SP GR UR STRIP: 1.01 (ref 1–1.03)
URN SPEC COLLECT METH UR: ABNORMAL
WBC # BLD AUTO: 10.51 K/UL (ref 3.9–12.7)
WBC #/AREA URNS AUTO: 0 /HPF (ref 0–5)

## 2021-10-27 PROCEDURE — 99284 PR EMERGENCY DEPT VISIT,LEVEL IV: ICD-10-PCS | Mod: HCNC,,, | Performed by: EMERGENCY MEDICINE

## 2021-10-27 PROCEDURE — 80047 BASIC METABLC PNL IONIZED CA: CPT | Mod: HCNC

## 2021-10-27 PROCEDURE — 99285 EMERGENCY DEPT VISIT HI MDM: CPT | Mod: 25,HCNC

## 2021-10-27 PROCEDURE — 63600175 PHARM REV CODE 636 W HCPCS: Mod: HCNC | Performed by: EMERGENCY MEDICINE

## 2021-10-27 PROCEDURE — 85025 COMPLETE CBC W/AUTO DIFF WBC: CPT | Mod: HCNC | Performed by: EMERGENCY MEDICINE

## 2021-10-27 PROCEDURE — 96374 THER/PROPH/DIAG INJ IV PUSH: CPT | Mod: HCNC

## 2021-10-27 PROCEDURE — 81001 URINALYSIS AUTO W/SCOPE: CPT | Mod: HCNC | Performed by: EMERGENCY MEDICINE

## 2021-10-27 PROCEDURE — 86803 HEPATITIS C AB TEST: CPT | Mod: HCNC | Performed by: EMERGENCY MEDICINE

## 2021-10-27 PROCEDURE — 96375 TX/PRO/DX INJ NEW DRUG ADDON: CPT | Mod: HCNC

## 2021-10-27 PROCEDURE — 99284 EMERGENCY DEPT VISIT MOD MDM: CPT | Mod: HCNC,,, | Performed by: EMERGENCY MEDICINE

## 2021-10-27 PROCEDURE — 80053 COMPREHEN METABOLIC PANEL: CPT | Mod: HCNC | Performed by: EMERGENCY MEDICINE

## 2021-10-27 RX ORDER — ONDANSETRON 2 MG/ML
4 INJECTION INTRAMUSCULAR; INTRAVENOUS
Status: COMPLETED | OUTPATIENT
Start: 2021-10-27 | End: 2021-10-27

## 2021-10-27 RX ORDER — MORPHINE SULFATE 4 MG/ML
8 INJECTION, SOLUTION INTRAMUSCULAR; INTRAVENOUS
Status: COMPLETED | OUTPATIENT
Start: 2021-10-27 | End: 2021-10-27

## 2021-10-27 RX ORDER — HYDROCODONE BITARTRATE AND ACETAMINOPHEN 5; 325 MG/1; MG/1
1 TABLET ORAL EVERY 4 HOURS PRN
Qty: 18 TABLET | Refills: 0 | Status: SHIPPED | OUTPATIENT
Start: 2021-10-27 | End: 2022-02-09

## 2021-10-27 RX ADMIN — ONDANSETRON 4 MG: 2 INJECTION INTRAMUSCULAR; INTRAVENOUS at 09:10

## 2021-10-27 RX ADMIN — MORPHINE SULFATE 8 MG: 4 INJECTION INTRAVENOUS at 09:10

## 2021-10-28 LAB — HCV AB SERPL QL IA: NEGATIVE

## 2021-11-01 ENCOUNTER — CLINICAL SUPPORT (OUTPATIENT)
Dept: CARDIOLOGY | Facility: HOSPITAL | Age: 76
End: 2021-11-01
Attending: INTERNAL MEDICINE
Payer: MEDICARE

## 2021-11-01 DIAGNOSIS — I48.0 PAF (PAROXYSMAL ATRIAL FIBRILLATION): Primary | ICD-10-CM

## 2021-11-01 DIAGNOSIS — I48.0 PAF (PAROXYSMAL ATRIAL FIBRILLATION): ICD-10-CM

## 2021-11-01 PROCEDURE — 93248 CV CARDIAC MONITOR - 3-15 DAY ADULT (CUPID ONLY): ICD-10-PCS | Mod: HCNC,,, | Performed by: INTERNAL MEDICINE

## 2021-11-01 PROCEDURE — 93248 EXT ECG>7D<15D REV&INTERPJ: CPT | Mod: HCNC,,, | Performed by: INTERNAL MEDICINE

## 2021-11-01 PROCEDURE — 93246 EXT ECG>7D<15D RECORDING: CPT | Mod: HCNC

## 2021-12-08 ENCOUNTER — TELEPHONE (OUTPATIENT)
Dept: ELECTROPHYSIOLOGY | Facility: CLINIC | Age: 76
End: 2021-12-08
Payer: MEDICARE

## 2021-12-08 ENCOUNTER — PATIENT MESSAGE (OUTPATIENT)
Dept: CARDIOLOGY | Facility: HOSPITAL | Age: 76
End: 2021-12-08
Payer: MEDICARE

## 2021-12-22 DIAGNOSIS — I10 ESSENTIAL HYPERTENSION: ICD-10-CM

## 2021-12-22 DIAGNOSIS — I10 HTN, GOAL BELOW 140/90: ICD-10-CM

## 2021-12-23 RX ORDER — HYDROCHLOROTHIAZIDE 25 MG/1
25 TABLET ORAL DAILY
Qty: 90 TABLET | Refills: 1 | Status: SHIPPED | OUTPATIENT
Start: 2021-12-23 | End: 2022-03-03 | Stop reason: SDUPTHER

## 2021-12-23 RX ORDER — AMLODIPINE BESYLATE 10 MG/1
10 TABLET ORAL DAILY
Qty: 90 TABLET | Refills: 0 | Status: SHIPPED | OUTPATIENT
Start: 2021-12-23 | End: 2022-03-02

## 2021-12-27 ENCOUNTER — OFFICE VISIT (OUTPATIENT)
Dept: INTERNAL MEDICINE | Facility: CLINIC | Age: 76
End: 2021-12-27
Payer: MEDICARE

## 2021-12-27 VITALS
SYSTOLIC BLOOD PRESSURE: 134 MMHG | DIASTOLIC BLOOD PRESSURE: 72 MMHG | OXYGEN SATURATION: 96 % | WEIGHT: 220.44 LBS | HEIGHT: 73 IN | RESPIRATION RATE: 16 BRPM | BODY MASS INDEX: 29.22 KG/M2 | HEART RATE: 79 BPM

## 2021-12-27 DIAGNOSIS — Z91.89 FRAMINGHAM CARDIAC RISK >20% IN NEXT 10 YEARS: ICD-10-CM

## 2021-12-27 DIAGNOSIS — E78.5 HYPERLIPIDEMIA LDL GOAL <100: ICD-10-CM

## 2021-12-27 DIAGNOSIS — Z85.828 HISTORY OF BASAL CELL CARCINOMA: ICD-10-CM

## 2021-12-27 DIAGNOSIS — Z12.11 COLON CANCER SCREENING: ICD-10-CM

## 2021-12-27 DIAGNOSIS — I70.0 ATHEROSCLEROSIS OF AORTA: ICD-10-CM

## 2021-12-27 DIAGNOSIS — E79.0 HYPERURICEMIA: ICD-10-CM

## 2021-12-27 DIAGNOSIS — Z87.19 HISTORY OF PANCREATITIS: ICD-10-CM

## 2021-12-27 DIAGNOSIS — Z00.00 ENCOUNTER FOR ANNUAL PHYSICAL EXAM: Primary | ICD-10-CM

## 2021-12-27 DIAGNOSIS — I48.0 PAROXYSMAL ATRIAL FIBRILLATION: ICD-10-CM

## 2021-12-27 DIAGNOSIS — E11.65 CONTROLLED TYPE 2 DIABETES MELLITUS WITH HYPERGLYCEMIA, WITHOUT LONG-TERM CURRENT USE OF INSULIN: ICD-10-CM

## 2021-12-27 DIAGNOSIS — H91.93 BILATERAL HEARING LOSS, UNSPECIFIED HEARING LOSS TYPE: ICD-10-CM

## 2021-12-27 DIAGNOSIS — I10 ESSENTIAL HYPERTENSION: ICD-10-CM

## 2021-12-27 LAB
ALBUMIN/CREAT UR: 6.5 UG/MG (ref 0–30)
CREAT UR-MCNC: 155 MG/DL (ref 23–375)
MICROALBUMIN UR DL<=1MG/L-MCNC: 10 UG/ML

## 2021-12-27 PROCEDURE — 99999 PR PBB SHADOW E&M-EST. PATIENT-LVL V: ICD-10-PCS | Mod: PBBFAC,,, | Performed by: INTERNAL MEDICINE

## 2021-12-27 PROCEDURE — 1126F AMNT PAIN NOTED NONE PRSNT: CPT | Mod: CPTII,S$GLB,, | Performed by: INTERNAL MEDICINE

## 2021-12-27 PROCEDURE — 3288F FALL RISK ASSESSMENT DOCD: CPT | Mod: CPTII,S$GLB,, | Performed by: INTERNAL MEDICINE

## 2021-12-27 PROCEDURE — 82043 UR ALBUMIN QUANTITATIVE: CPT | Mod: HCNC | Performed by: INTERNAL MEDICINE

## 2021-12-27 PROCEDURE — 3075F SYST BP GE 130 - 139MM HG: CPT | Mod: CPTII,S$GLB,, | Performed by: INTERNAL MEDICINE

## 2021-12-27 PROCEDURE — 3288F PR FALLS RISK ASSESSMENT DOCUMENTED: ICD-10-PCS | Mod: CPTII,S$GLB,, | Performed by: INTERNAL MEDICINE

## 2021-12-27 PROCEDURE — 99499 UNLISTED E&M SERVICE: CPT | Mod: HCNC,S$GLB,, | Performed by: INTERNAL MEDICINE

## 2021-12-27 PROCEDURE — 99214 PR OFFICE/OUTPT VISIT, EST, LEVL IV, 30-39 MIN: ICD-10-PCS | Mod: S$GLB,,, | Performed by: INTERNAL MEDICINE

## 2021-12-27 PROCEDURE — 3075F PR MOST RECENT SYSTOLIC BLOOD PRESS GE 130-139MM HG: ICD-10-PCS | Mod: CPTII,S$GLB,, | Performed by: INTERNAL MEDICINE

## 2021-12-27 PROCEDURE — 1101F PR PT FALLS ASSESS DOC 0-1 FALLS W/OUT INJ PAST YR: ICD-10-PCS | Mod: CPTII,S$GLB,, | Performed by: INTERNAL MEDICINE

## 2021-12-27 PROCEDURE — 1101F PT FALLS ASSESS-DOCD LE1/YR: CPT | Mod: CPTII,S$GLB,, | Performed by: INTERNAL MEDICINE

## 2021-12-27 PROCEDURE — 99214 OFFICE O/P EST MOD 30 MIN: CPT | Mod: S$GLB,,, | Performed by: INTERNAL MEDICINE

## 2021-12-27 PROCEDURE — 99499 RISK ADDL DX/OHS AUDIT: ICD-10-PCS | Mod: HCNC,S$GLB,, | Performed by: INTERNAL MEDICINE

## 2021-12-27 PROCEDURE — 3078F PR MOST RECENT DIASTOLIC BLOOD PRESSURE < 80 MM HG: ICD-10-PCS | Mod: CPTII,S$GLB,, | Performed by: INTERNAL MEDICINE

## 2021-12-27 PROCEDURE — 99999 PR PBB SHADOW E&M-EST. PATIENT-LVL V: CPT | Mod: PBBFAC,,, | Performed by: INTERNAL MEDICINE

## 2021-12-27 PROCEDURE — 3078F DIAST BP <80 MM HG: CPT | Mod: CPTII,S$GLB,, | Performed by: INTERNAL MEDICINE

## 2021-12-27 PROCEDURE — 1126F PR PAIN SEVERITY QUANTIFIED, NO PAIN PRESENT: ICD-10-PCS | Mod: CPTII,S$GLB,, | Performed by: INTERNAL MEDICINE

## 2021-12-27 NOTE — PROGRESS NOTES
Subjective:       Patient ID: Ezekiel Mueller is a 76 y.o. male.    Chief Complaint: Establish Care      Ezekiel Mueller is a 76 y.o. year old male    HPI   76 y.o. with history of afib on OAC, HTN, fatty liver, hx of pancreatitis, prediabetes, hx of BCC of R eyelid, colon polyps presents for establishment of care. Has been off metoprolol and atorvastatin due to associated fatigue.       Review of Systems   Constitutional: Negative for fatigue, fever and unexpected weight change.   HENT: Negative for congestion, hearing loss, sinus pressure, sore throat and trouble swallowing.    Eyes: Negative for redness, itching and visual disturbance.   Respiratory: Negative for cough, chest tightness, shortness of breath and wheezing.    Cardiovascular: Negative for chest pain, palpitations and leg swelling.   Gastrointestinal: Negative for abdominal distention, abdominal pain, blood in stool, constipation, diarrhea, nausea and vomiting.   Genitourinary: Negative for difficulty urinating, dysuria and hematuria.   Musculoskeletal: Negative for arthralgias, back pain, gait problem and myalgias.        Missing 2nd digit L foot   Skin: Negative for rash.   Neurological: Negative for dizziness, syncope, weakness, numbness and headaches.   Hematological: Does not bruise/bleed easily.   Psychiatric/Behavioral: Negative for sleep disturbance. The patient is not nervous/anxious.          Past Medical History:   Diagnosis Date    Basal cell carcinoma 02/2015    R eyelid    Borderline diabetes 2016    [facility]     [facility]    Elevated blood uric acid level 2016    Fatty liver     Hypertension     diagnosed 2005    Pancreatitis 07/2015    etiology unclear        Prior to Admission medications    Medication Sig Start Date End Date Taking? Authorizing Provider   amLODIPine (NORVASC) 10 MG tablet Take 1 tablet (10 mg total) by mouth once daily. For blood pressure 12/23/21  Yes Margarita  ZULEMA Owusu   aspirin (ECOTRIN) 81 MG EC tablet Take 81 mg by mouth once daily.   Yes Historical Provider   atorvastatin (LIPITOR) 40 MG tablet Take 1 tablet (40 mg total) by mouth once daily. 9/29/21 9/29/22 Yes Margarita Owusu NP   blood sugar diagnostic Strp 1 strip by Misc.(Non-Drug; Combo Route) route once daily. 2/20/18  Yes Joshua Leija MD   cholecalciferol, vitamin D3, (VITAMIN D3) 50 mcg (2,000 unit) Cap Take 1 capsule by mouth once daily.   Yes Historical Provider   erythromycin (ROMYCIN) ophthalmic ointment Place into the left eye every evening. For 5 days for infection in left eye 7/14/21  Yes Tiffany Figueredo DNP   hydroCHLOROthiazide (HYDRODIURIL) 25 MG tablet Take 1 tablet (25 mg total) by mouth once daily. 12/23/21  Yes Tiffany Figueredo DNP   HYDROcodone-acetaminophen (NORCO) 5-325 mg per tablet Take 1 tablet by mouth every 4 (four) hours as needed for Pain. 10/27/21  Yes Rohan Floyd MD   losartan (COZAAR) 25 MG tablet Take 0.5 tablets (12.5 mg total) by mouth once daily. 12/23/21  Yes Ye Dudley MD   metFORMIN (GLUCOPHAGE) 500 MG tablet Take 1 tablet (500 mg total) by mouth 2 (two) times daily with meals. 5/2/21  Yes Al Harvey MD   metoprolol succinate (TOPROL-XL) 25 MG 24 hr tablet Take 1 tablet (25 mg total) by mouth once daily. 9/29/21 9/29/22 Yes Margarita Owusu NP   multivitamin with minerals tablet Take 1 tablet by mouth once daily.   Yes Historical Provider   omega-3 fatty acids/fish oil (FISH OIL-OMEGA-3 FATTY ACIDS) 300-1,000 mg capsule Take by mouth once daily.   Yes Historical Provider   rivaroxaban (XARELTO) 20 mg Tab Take 1 tablet (20 mg total) by mouth once daily. 9/29/21  Yes Margarita Owusu NP   TRUEPLUS LANCETS 33 gauge Misc USE AS DIRECTED THREE TIMES DAILY AS NEEDED 12/12/18  Yes Joshua Leija MD   vits A,C,E/lutein/minerals (HEALTHY EYES ORAL) Take by mouth once daily.   Yes Historical Provider   blood-glucose meter Misc 1 kit by Misc.(Non-Drug; Combo Route)  "route 3 (three) times daily. 2/20/18 9/20/21  Joshua Leija MD   lancing device (BD LANCET DEVICE) Misc 1 Units by Misc.(Non-Drug; Combo Route) route once daily. Please dispense lancets and strips - # 100 2/19/18 9/20/21  Joshua Leija MD        Past medical history, surgical history, and family medical history reviewed and updated as appropriate.    Medications and allergies reviewed.     Objective:          Vitals:    12/27/21 1437   BP: 134/72   BP Location: Left arm   Patient Position: Sitting   BP Method: Medium (Manual)   Pulse: 79   Resp: 16   SpO2: 96%   Weight: 100 kg (220 lb 7.4 oz)   Height: 6' 1" (1.854 m)     Body mass index is 29.09 kg/m².  Physical Exam  Constitutional:       General: He is not in acute distress.     Appearance: He is well-developed and well-nourished.   HENT:      Head: Normocephalic and atraumatic.      Comments: S/p reconstructive surgery L temple     Nose: Nose normal.      Mouth/Throat:      Mouth: Oropharynx is clear and moist.   Eyes:      General: No scleral icterus.     Extraocular Movements: Extraocular movements intact and EOM normal.   Neck:      Thyroid: No thyromegaly.      Vascular: No JVD.      Trachea: No tracheal deviation.   Cardiovascular:      Rate and Rhythm: Normal rate and regular rhythm.      Heart sounds: Normal heart sounds. No murmur heard.  No friction rub. No gallop.    Pulmonary:      Effort: Pulmonary effort is normal. No respiratory distress.      Breath sounds: Normal breath sounds. No wheezing or rales.   Abdominal:      General: Bowel sounds are normal. There is no distension.      Palpations: Abdomen is soft. There is no mass.      Tenderness: There is no abdominal tenderness.   Musculoskeletal:         General: No tenderness or edema. Normal range of motion.      Cervical back: Normal range of motion and neck supple.   Lymphadenopathy:      Cervical: No cervical adenopathy.   Skin:     General: Skin is warm and dry.      Findings: No rash. "   Neurological:      Mental Status: He is alert and oriented to person, place, and time.      Cranial Nerves: No cranial nerve deficit.      Deep Tendon Reflexes: Reflexes normal.   Psychiatric:         Mood and Affect: Mood and affect normal.         Behavior: Behavior normal.          Protective Sensation (w/ 10 gram monofilament):  Right: Intact  Left: Intact    Visual Inspection:  Normal -  Bilateral  Missing 2nd toe of left foot    Pedal Pulses:   Right: Present  Left: Present    Posterior tibialis:   Right:Present  Left: Present        Lab Results   Component Value Date    WBC 10.51 10/27/2021    HGB 14.4 10/27/2021    HCT 43 10/27/2021     10/27/2021    CHOL 206 (H) 09/20/2021    TRIG 176 (H) 09/20/2021    HDL 51 09/20/2021    ALT 26 10/27/2021    AST 25 10/27/2021     10/27/2021    K 3.3 (L) 10/27/2021    CL 99 10/27/2021    CREATININE 1.3 10/27/2021    BUN 19 10/27/2021    CO2 26 10/27/2021    TSH 5.512 (H) 09/20/2021    PSA 0.80 04/23/2021    INR 1.0 06/26/2018    HGBA1C 5.8 (H) 04/23/2021       Assessment:       1. Encounter for annual physical exam    2. Paroxysmal atrial fibrillation    3. Essential hypertension    4. Controlled type 2 diabetes mellitus with hyperglycemia, without long-term current use of insulin    5. Hyperlipidemia LDL goal <100    6. Colon cancer screening    7. Atherosclerosis of aorta    8. History of pancreatitis    9. History of basal cell carcinoma    10. Hyperuricemia    11. Bilateral hearing loss, unspecified hearing loss type    12. [place] cardiac risk >20% in next 10 years          Plan:     Ezekiel was seen today for establish care.    Diagnoses and all orders for this visit:    Encounter for annual physical exam    Paroxysmal atrial fibrillation  Comments:  NSR currently, asymptomatic, elevated [id number] - on xarelto 20 mg daily. Supposed to be taking toprol XL 25 but has not due to fatigue. Restart as 1/2 dose.    Essential hypertension  Comments:  blood  pressure controlled. continue present therapy.     Controlled type 2 diabetes mellitus with hyperglycemia, without long-term current use of insulin  Comments:  controlled, last a1c <6.5. Will repeat. Will obtain urine microalbumin. Foot screen negative. Sees optometry.  Orders:  -     Hemoglobin A1C; Future  -     MICROALBUMIN / CREATININE RATIO URINE  -     Ambulatory referral/consult to Optometry; Future    Hyperlipidemia LDL goal <100  Comments:  lifestyle controlled, has been off lipitor due to not wanting to take meds. Discussed ascvd score. Needs to be back on. Pt to restart.    Colon cancer screening  -     Case Request Endoscopy: COLONOSCOPY    Atherosclerosis of aorta    History of pancreatitis    History of basal cell carcinoma    Hyperuricemia    Bilateral hearing loss, unspecified hearing loss type  -     Ambulatory referral/consult to Audiology; Future    [place] cardiac risk >20% in next 10 years    long discussion regarding medical compliance     Health maintenance reviewed with patient.     Follow up in about 6 months (around 6/27/2022).    Ye Dudley MD  Internal Medicine / Primary Care  [facility]  12/27/2021

## 2021-12-27 NOTE — PATIENT INSTRUCTIONS
Labwork today  Urine sample today    Restart your metoprolol and atorvastatin at half doses and increase as tolerated.    [   ] Covid-19 booster - moderna [phone]  [   ] Flu shot    [   ] shingrix #1  [   ] shingrix #2 (2-6 months after #1)    Colonoscopy - a colonoscopy has been ordered for you. In order to schedule this appointment, please call [phone].     Schedule audiology appointment  Schedule optometry appointment    Return to clinic in 6 months

## 2021-12-29 DIAGNOSIS — I70.0 ATHEROSCLEROSIS OF AORTA: ICD-10-CM

## 2021-12-29 DIAGNOSIS — I48.91 NEW ONSET ATRIAL FIBRILLATION: ICD-10-CM

## 2021-12-29 DIAGNOSIS — E78.5 HYPERLIPIDEMIA LDL GOAL <100: ICD-10-CM

## 2021-12-30 RX ORDER — ATORVASTATIN CALCIUM 40 MG/1
40 TABLET, FILM COATED ORAL DAILY
Qty: 90 TABLET | Refills: 3 | Status: SHIPPED | OUTPATIENT
Start: 2021-12-30 | End: 2022-03-22 | Stop reason: SDUPTHER

## 2021-12-30 RX ORDER — METOPROLOL SUCCINATE 25 MG/1
25 TABLET, EXTENDED RELEASE ORAL DAILY
Qty: 90 TABLET | Refills: 3 | Status: SHIPPED | OUTPATIENT
Start: 2021-12-30 | End: 2022-01-28

## 2022-01-13 ENCOUNTER — TELEPHONE (OUTPATIENT)
Dept: OTOLARYNGOLOGY | Facility: CLINIC | Age: 77
End: 2022-01-13
Payer: MEDICARE

## 2022-01-25 PROBLEM — I48.0 PAROXYSMAL ATRIAL FIBRILLATION: Status: ACTIVE | Noted: 2022-01-25

## 2022-01-25 NOTE — PROGRESS NOTES
Subjective:     HPI    I had the pleasure of seeing Ezekiel Mueller in follow-up for his history of AF. He was last seen in 10/2021. 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 He was seen by cardiology several hours later, at which point he was back in sinus.    Echo in 9/2021 showed an EF of 60% and normal LA size. [facility]. A 48 hour Holter performed in 10/2021 showed sinus rhythm with an average HR of 74 bpm, no arrhythmias. A 2 week Zio monitor performed in 11/2021 showed an AF burden of 7%, with the longest episode lasting 15h 26m. Average HR in  bpm. He was asymptomatic while wearing it.    Mr. Mueller is currently on xarelto for stroke prophylaxis.    My interpretation of today's ECG is sinus rhythm at 63 bpm with RBBB.    Review of Systems   Constitutional: Negative for decreased appetite, malaise/fatigue, weight gain and weight loss.   HENT: Negative for sore throat.    Eyes: Negative for blurred vision.   Cardiovascular: Negative for chest pain, dyspnea on exertion, irregular heartbeat, leg swelling, near-syncope, orthopnea, palpitations, paroxysmal nocturnal dyspnea and syncope.   Respiratory: Negative for shortness of breath.    Skin: Negative for rash.   Musculoskeletal: Negative for arthritis.   Gastrointestinal: Negative for abdominal pain.   Neurological: Negative for focal weakness.   Psychiatric/Behavioral: Negative for altered mental status.        Objective:    Physical Exam  Vitals and nursing note reviewed.   Constitutional:       General: He is not in acute distress.     Appearance: He is well-developed and well-nourished.   HENT:      Head: Normocephalic and atraumatic.      Mouth/Throat:      Mouth: Oropharynx is clear and moist.   Eyes:      General: No scleral icterus.     Pupils: Pupils are equal, round, and reactive to light.   Neck:       Thyroid: No thyromegaly.   Cardiovascular:      Rate and Rhythm: Regular rhythm.      Pulses: Normal pulses.      Heart sounds: Normal heart sounds. No murmur heard.  No friction rub. No gallop.    Pulmonary:      Effort: Pulmonary effort is normal.      Breath sounds: Normal breath sounds.   Abdominal:      General: Bowel sounds are normal. There is no distension.      Palpations: Abdomen is soft.      Tenderness: There is no abdominal tenderness.   Musculoskeletal:         General: No edema.      Cervical back: Neck supple.   Skin:     General: Skin is warm and dry.      Findings: No rash.   Neurological:      Mental Status: He is alert and oriented to person, place, and time.   Psychiatric:         Mood and Affect: Mood and affect normal.         Behavior: Behavior normal.           Assessment:       1. Essential hypertension    2. Paroxysmal atrial fibrillation    3. Hyperlipidemia LDL goal <100         Plan:       In summary, Ezekiel Mueller is a 76 year old male with a history of symptomatic PAF. His [id number] Score is 3 (age, HTN) so he should continue xarelto.    Mr. Mueller has a 7% AF burden based on recent Zio. He has a baseline RBBB so Class IC antiarrhythmics are not reasonable. The plan is to stop toprol, and start sotalol 80 mg bid. An ECG will be checked on Monday for QTc. He will see me in 6 months.    Thank you for allowing me to participate in the care of this patient. Please do not hesitate to call me with any questions or concerns.

## 2022-01-26 ENCOUNTER — PATIENT MESSAGE (OUTPATIENT)
Dept: ADMINISTRATIVE | Facility: HOSPITAL | Age: 77
End: 2022-01-26
Payer: MEDICARE

## 2022-01-28 ENCOUNTER — HOSPITAL ENCOUNTER (OUTPATIENT)
Dept: CARDIOLOGY | Facility: CLINIC | Age: 77
Discharge: HOME | End: 2022-01-28
Payer: MEDICARE

## 2022-01-28 ENCOUNTER — OFFICE VISIT (OUTPATIENT)
Dept: ELECTROPHYSIOLOGY | Facility: CLINIC | Age: 77
End: 2022-01-28
Payer: MEDICARE

## 2022-01-28 VITALS
WEIGHT: 216.5 LBS | HEIGHT: 73 IN | BODY MASS INDEX: 28.69 KG/M2 | HEART RATE: 63 BPM | SYSTOLIC BLOOD PRESSURE: 148 MMHG | DIASTOLIC BLOOD PRESSURE: 80 MMHG

## 2022-01-28 DIAGNOSIS — I10 ESSENTIAL HYPERTENSION: Primary | ICD-10-CM

## 2022-01-28 DIAGNOSIS — I49.8 OTHER SPECIFIED CARDIAC ARRHYTHMIAS: ICD-10-CM

## 2022-01-28 DIAGNOSIS — E78.5 HYPERLIPIDEMIA LDL GOAL <100: ICD-10-CM

## 2022-01-28 DIAGNOSIS — I48.0 PAROXYSMAL ATRIAL FIBRILLATION: ICD-10-CM

## 2022-01-28 PROCEDURE — 1159F PR MEDICATION LIST DOCUMENTED IN MEDICAL RECORD: ICD-10-PCS | Mod: HCNC,CPTII,S$GLB, | Performed by: INTERNAL MEDICINE

## 2022-01-28 PROCEDURE — 99499 RISK ADDL DX/OHS AUDIT: ICD-10-PCS | Mod: S$GLB,,, | Performed by: INTERNAL MEDICINE

## 2022-01-28 PROCEDURE — 3077F PR MOST RECENT SYSTOLIC BLOOD PRESSURE >= 140 MM HG: ICD-10-PCS | Mod: HCNC,CPTII,S$GLB, | Performed by: INTERNAL MEDICINE

## 2022-01-28 PROCEDURE — 99214 PR OFFICE/OUTPT VISIT, EST, LEVL IV, 30-39 MIN: ICD-10-PCS | Mod: HCNC,S$GLB,, | Performed by: INTERNAL MEDICINE

## 2022-01-28 PROCEDURE — 93010 ELECTROCARDIOGRAM REPORT: CPT | Mod: HCNC,S$GLB,, | Performed by: INTERNAL MEDICINE

## 2022-01-28 PROCEDURE — 93010 RHYTHM STRIP: ICD-10-PCS | Mod: HCNC,S$GLB,, | Performed by: INTERNAL MEDICINE

## 2022-01-28 PROCEDURE — 3288F PR FALLS RISK ASSESSMENT DOCUMENTED: ICD-10-PCS | Mod: HCNC,CPTII,S$GLB, | Performed by: INTERNAL MEDICINE

## 2022-01-28 PROCEDURE — 99499 UNLISTED E&M SERVICE: CPT | Mod: S$GLB,,, | Performed by: INTERNAL MEDICINE

## 2022-01-28 PROCEDURE — 1159F MED LIST DOCD IN RCRD: CPT | Mod: HCNC,CPTII,S$GLB, | Performed by: INTERNAL MEDICINE

## 2022-01-28 PROCEDURE — 99999 PR PBB SHADOW E&M-EST. PATIENT-LVL III: ICD-10-PCS | Mod: PBBFAC,HCNC,, | Performed by: INTERNAL MEDICINE

## 2022-01-28 PROCEDURE — 1126F PR PAIN SEVERITY QUANTIFIED, NO PAIN PRESENT: ICD-10-PCS | Mod: HCNC,CPTII,S$GLB, | Performed by: INTERNAL MEDICINE

## 2022-01-28 PROCEDURE — 3079F PR MOST RECENT DIASTOLIC BLOOD PRESSURE 80-89 MM HG: ICD-10-PCS | Mod: HCNC,CPTII,S$GLB, | Performed by: INTERNAL MEDICINE

## 2022-01-28 PROCEDURE — 93005 RHYTHM STRIP: ICD-10-PCS | Mod: HCNC,S$GLB,, | Performed by: INTERNAL MEDICINE

## 2022-01-28 PROCEDURE — 3077F SYST BP >= 140 MM HG: CPT | Mod: HCNC,CPTII,S$GLB, | Performed by: INTERNAL MEDICINE

## 2022-01-28 PROCEDURE — 1160F PR REVIEW ALL MEDS BY PRESCRIBER/CLIN PHARMACIST DOCUMENTED: ICD-10-PCS | Mod: HCNC,CPTII,S$GLB, | Performed by: INTERNAL MEDICINE

## 2022-01-28 PROCEDURE — 99214 OFFICE O/P EST MOD 30 MIN: CPT | Mod: HCNC,S$GLB,, | Performed by: INTERNAL MEDICINE

## 2022-01-28 PROCEDURE — 93005 ELECTROCARDIOGRAM TRACING: CPT | Mod: HCNC,S$GLB,, | Performed by: INTERNAL MEDICINE

## 2022-01-28 PROCEDURE — 3288F FALL RISK ASSESSMENT DOCD: CPT | Mod: HCNC,CPTII,S$GLB, | Performed by: INTERNAL MEDICINE

## 2022-01-28 PROCEDURE — 1101F PT FALLS ASSESS-DOCD LE1/YR: CPT | Mod: HCNC,CPTII,S$GLB, | Performed by: INTERNAL MEDICINE

## 2022-01-28 PROCEDURE — 1101F PR PT FALLS ASSESS DOC 0-1 FALLS W/OUT INJ PAST YR: ICD-10-PCS | Mod: HCNC,CPTII,S$GLB, | Performed by: INTERNAL MEDICINE

## 2022-01-28 PROCEDURE — 99999 PR PBB SHADOW E&M-EST. PATIENT-LVL III: CPT | Mod: PBBFAC,HCNC,, | Performed by: INTERNAL MEDICINE

## 2022-01-28 PROCEDURE — 1160F RVW MEDS BY RX/DR IN RCRD: CPT | Mod: HCNC,CPTII,S$GLB, | Performed by: INTERNAL MEDICINE

## 2022-01-28 PROCEDURE — 1126F AMNT PAIN NOTED NONE PRSNT: CPT | Mod: HCNC,CPTII,S$GLB, | Performed by: INTERNAL MEDICINE

## 2022-01-28 PROCEDURE — 3079F DIAST BP 80-89 MM HG: CPT | Mod: HCNC,CPTII,S$GLB, | Performed by: INTERNAL MEDICINE

## 2022-01-28 RX ORDER — SOTALOL HYDROCHLORIDE 80 MG/1
80 TABLET ORAL 2 TIMES DAILY
Qty: 60 TABLET | Refills: 11 | Status: SHIPPED | OUTPATIENT
Start: 2022-01-28 | End: 2022-01-28

## 2022-01-28 RX ORDER — SOTALOL HYDROCHLORIDE 80 MG/1
80 TABLET ORAL 2 TIMES DAILY
Qty: 60 TABLET | Refills: 11 | Status: SHIPPED | OUTPATIENT
Start: 2022-01-28 | End: 2022-04-27 | Stop reason: SDUPTHER

## 2022-02-04 ENCOUNTER — HOSPITAL ENCOUNTER (OUTPATIENT)
Dept: CARDIOLOGY | Facility: CLINIC | Age: 77
Discharge: HOME | End: 2022-02-04
Payer: MEDICARE

## 2022-02-04 ENCOUNTER — TELEPHONE (OUTPATIENT)
Dept: ELECTROPHYSIOLOGY | Facility: CLINIC | Age: 77
End: 2022-02-04
Payer: MEDICARE

## 2022-02-04 DIAGNOSIS — I10 ESSENTIAL HYPERTENSION: ICD-10-CM

## 2022-02-04 DIAGNOSIS — I48.0 PAROXYSMAL ATRIAL FIBRILLATION: ICD-10-CM

## 2022-02-04 PROCEDURE — 93005 ELECTROCARDIOGRAM TRACING: CPT | Mod: HCNC,S$GLB,, | Performed by: INTERNAL MEDICINE

## 2022-02-04 PROCEDURE — 93005 RHYTHM STRIP: ICD-10-PCS | Mod: HCNC,S$GLB,, | Performed by: INTERNAL MEDICINE

## 2022-02-04 PROCEDURE — 93010 ELECTROCARDIOGRAM REPORT: CPT | Mod: HCNC,S$GLB,, | Performed by: INTERNAL MEDICINE

## 2022-02-04 PROCEDURE — 93010 RHYTHM STRIP: ICD-10-PCS | Mod: HCNC,S$GLB,, | Performed by: INTERNAL MEDICINE

## 2022-02-04 NOTE — TELEPHONE ENCOUNTER
Called pt to advise      ----- Message from Fermín Trevino MD sent at 2/4/2022  4:31 PM CST -----  Looks good    ----- Message -----  From: Mandie Woodruff RN  Sent: 2/4/2022   1:21 PM CST  To: Fermín Trevino MD    Pt had EKG done today see epic. Pt started on SOTALOL.  ----- Message -----  From: Fermín Trevino MD  Sent: 1/28/2022   8:49 AM CST  To: Mandie Woodruff RN    New sotalol start for next Wednesday. I ordered an ECG for Friday. Can you remind me to look at it.    Thanks GP

## 2022-02-09 ENCOUNTER — OFFICE VISIT (OUTPATIENT)
Dept: CARDIOLOGY | Facility: CLINIC | Age: 77
End: 2022-02-09
Payer: MEDICARE

## 2022-02-09 VITALS
WEIGHT: 217.63 LBS | HEIGHT: 73 IN | SYSTOLIC BLOOD PRESSURE: 149 MMHG | DIASTOLIC BLOOD PRESSURE: 72 MMHG | HEART RATE: 63 BPM | BODY MASS INDEX: 28.84 KG/M2

## 2022-02-09 DIAGNOSIS — I48.0 PAROXYSMAL A-FIB: Primary | ICD-10-CM

## 2022-02-09 PROCEDURE — 99999 PR PBB SHADOW E&M-EST. PATIENT-LVL IV: CPT | Mod: PBBFAC,HCNC,GC, | Performed by: INTERNAL MEDICINE

## 2022-02-09 PROCEDURE — 3077F PR MOST RECENT SYSTOLIC BLOOD PRESSURE >= 140 MM HG: ICD-10-PCS | Mod: CPTII,GC,S$GLB, | Performed by: INTERNAL MEDICINE

## 2022-02-09 PROCEDURE — 99999 PR PBB SHADOW E&M-EST. PATIENT-LVL IV: ICD-10-PCS | Mod: PBBFAC,HCNC,GC, | Performed by: INTERNAL MEDICINE

## 2022-02-09 PROCEDURE — 3288F PR FALLS RISK ASSESSMENT DOCUMENTED: ICD-10-PCS | Mod: CPTII,GC,S$GLB, | Performed by: INTERNAL MEDICINE

## 2022-02-09 PROCEDURE — 3288F FALL RISK ASSESSMENT DOCD: CPT | Mod: CPTII,GC,S$GLB, | Performed by: INTERNAL MEDICINE

## 2022-02-09 PROCEDURE — 1101F PR PT FALLS ASSESS DOC 0-1 FALLS W/OUT INJ PAST YR: ICD-10-PCS | Mod: CPTII,GC,S$GLB, | Performed by: INTERNAL MEDICINE

## 2022-02-09 PROCEDURE — 99214 OFFICE O/P EST MOD 30 MIN: CPT | Mod: HCNC,GC,S$GLB, | Performed by: INTERNAL MEDICINE

## 2022-02-09 PROCEDURE — 99214 PR OFFICE/OUTPT VISIT, EST, LEVL IV, 30-39 MIN: ICD-10-PCS | Mod: HCNC,GC,S$GLB, | Performed by: INTERNAL MEDICINE

## 2022-02-09 PROCEDURE — 1126F AMNT PAIN NOTED NONE PRSNT: CPT | Mod: CPTII,GC,S$GLB, | Performed by: INTERNAL MEDICINE

## 2022-02-09 PROCEDURE — 1101F PT FALLS ASSESS-DOCD LE1/YR: CPT | Mod: CPTII,GC,S$GLB, | Performed by: INTERNAL MEDICINE

## 2022-02-09 PROCEDURE — 3077F SYST BP >= 140 MM HG: CPT | Mod: CPTII,GC,S$GLB, | Performed by: INTERNAL MEDICINE

## 2022-02-09 PROCEDURE — 3078F PR MOST RECENT DIASTOLIC BLOOD PRESSURE < 80 MM HG: ICD-10-PCS | Mod: CPTII,GC,S$GLB, | Performed by: INTERNAL MEDICINE

## 2022-02-09 PROCEDURE — 1126F PR PAIN SEVERITY QUANTIFIED, NO PAIN PRESENT: ICD-10-PCS | Mod: CPTII,GC,S$GLB, | Performed by: INTERNAL MEDICINE

## 2022-02-09 PROCEDURE — 3078F DIAST BP <80 MM HG: CPT | Mod: CPTII,GC,S$GLB, | Performed by: INTERNAL MEDICINE

## 2022-02-09 NOTE — PROGRESS NOTES
Subjective:   Patient ID:  Ezekiel Mueller is a 76 y.o. male who presents for follow-up of Coronary artery disease involving native coronary artery of (3 month f/u )    Problem:  HTN  HLD  Paroxysmal atrial fibrillation  Pulmonary hypertension    HPI  Mr. Mueller is in clinic today for follow up after recently diagnosed atrial fibrillation that was incidentally found in the internal medicine clinic this morning.  He was seen in Cards afterwards and referred to EP, who placed a ziopatch, which revealed 7% AF burden. Pt has been started on xarelto and sotalol. He is tolerating medications well with possible increase in diarrhea (1 out of 3days > 1 out of 2 days. He denies being symptomatic at any point with AF.  He's not sure if he snores.  Denies multiple night time awakenings or daytime sleepiness.  Home BP runs 130s/70s.  Patient denies chest pain with exertion or at rest, palpitations, SOB, MISHRA, dizziness, syncope, edema, orthopnea, PND, or claudication.  Reports walking 3 miles 3-4 days a week.     Interval history:    Since starting sotalol, he reports feeling moments of woozy. He still walks daily, goes hunting, walks up hills. He denies palpitations, changes in energy, pnd/orthopnea, SOB or other symptoms at time. Denies symptoms bleeding after starting xarelto.     Review of Systems   Constitutional: Negative for decreased appetite, diaphoresis, malaise/fatigue, weight gain and weight loss.   Eyes: Negative for visual disturbance.   Cardiovascular: Negative for chest pain, claudication, dyspnea on exertion, leg swelling, near-syncope, orthopnea, palpitations, paroxysmal nocturnal dyspnea and syncope.        Denies chest pressure   Respiratory: Negative for cough, hemoptysis, shortness of breath, sleep disturbances due to breathing and snoring.    Endocrine: Negative for cold intolerance and heat intolerance.   Hematologic/Lymphatic: Negative for bleeding problem. Does not bruise/bleed easily.    Musculoskeletal: Negative for myalgias.   Gastrointestinal: Positive for diarrhea (about every other day, attributes to metformin). Negative for bloating, abdominal pain, anorexia, change in bowel habit, constipation, nausea and vomiting.   Neurological: Negative for difficulty with concentration, disturbances in coordination, excessive daytime sleepiness, dizziness, headaches, light-headedness, loss of balance, numbness and weakness.   Psychiatric/Behavioral: The patient does not have insomnia.        Allergies and current medications updated and reviewed:   Review of patient's allergies indicates:  No Known Allergies  Current Outpatient Medications   Medication Sig    amLODIPine (NORVASC) 10 MG tablet Take 1 tablet (10 mg total) by mouth once daily. For blood pressure    aspirin (ECOTRIN) 81 MG EC tablet Take 81 mg by mouth once daily.    atorvastatin (LIPITOR) 20 MG tablet Take 1 tablet (20 mg total) by mouth once daily.    blood sugar diagnostic Strp 1 strip by Misc.(Non-Drug; Combo Route) route once daily.    blood-glucose meter Misc 1 kit by Misc.(Non-Drug; Combo Route) route 3 (three) times daily.    cholecalciferol, vitamin D3, (VITAMIN D3) 50 mcg (2,000 unit) Cap Take 1 capsule by mouth once daily.    erythromycin (ROMYCIN) ophthalmic ointment Place into the left eye every evening. For 5 days for infection in left eye    hydroCHLOROthiazide (HYDRODIURIL) 25 MG tablet TAKE 1 TABLET ONE TIME DAILY FOR BLOOD PRESSURE    lancing device (BD LANCET DEVICE) Misc 1 Units by Misc.(Non-Drug; Combo Route) route once daily. Please dispense lancets and strips - # 100    losartan (COZAAR) 25 MG tablet Take 0.5 tablets (12.5 mg total) by mouth once daily.    metFORMIN (GLUCOPHAGE) 500 MG tablet Take 1 tablet (500 mg total) by mouth 2 (two) times daily with meals.    multivitamin with minerals tablet Take 1 tablet by mouth once daily.    omega-3 fatty acids/fish oil (FISH OIL-OMEGA-3 FATTY ACIDS) 300-1,000  "mg capsule Take by mouth once daily.    TRUEPLUS LANCETS 33 gauge Misc USE AS DIRECTED THREE TIMES DAILY AS NEEDED    vits A,C,E/lutein/minerals (HEALTHY EYES ORAL) Take by mouth once daily.     No current facility-administered medications for this visit.       Objective:     Right Arm BP - Sittin/78 (22 1458)  Left Arm BP - Sittin/72 (22 1458)    BP (!) 149/72 (BP Location: Left arm, Patient Position: Standing, BP Method: Medium (Automatic))   Pulse 63   Ht 6' 1" (1.854 m)   Wt 98.7 kg (217 lb 9.5 oz)   BMI 28.71 kg/m²       Physical Exam  Vitals and nursing note reviewed.   Constitutional:       General: He is not in acute distress.     Appearance: Normal appearance. He is well-developed. He is not diaphoretic.   HENT:      Head: Normocephalic and atraumatic.   Eyes:      General: Lids are normal. No scleral icterus.     Conjunctiva/sclera: Conjunctivae normal.   Neck:      Vascular: Normal carotid pulses. No carotid bruit, hepatojugular reflux or JVD.   Cardiovascular:      Rate and Rhythm: Normal rate and regular rhythm.      Chest Wall: PMI is not displaced.      Pulses: Intact distal pulses.           Carotid pulses are 2+ on the right side and 2+ on the left side.       Radial pulses are 2+ on the right side and 2+ on the left side.        Dorsalis pedis pulses are 2+ on the right side and 2+ on the left side.        Posterior tibial pulses are 1+ on the right side and 1+ on the left side.      Heart sounds: S1 normal and S2 normal. No murmur heard.  No friction rub. No gallop.    Pulmonary:      Effort: Pulmonary effort is normal. No respiratory distress.      Breath sounds: Normal breath sounds. No decreased breath sounds, wheezing, rhonchi or rales.   Chest:      Chest wall: No tenderness.   Abdominal:      General: Bowel sounds are normal. There is no distension or abdominal bruit.      Palpations: Abdomen is soft. There is no fluid wave or pulsatile mass.      Tenderness: " There is no abdominal tenderness.   Musculoskeletal:      Cervical back: Neck supple.   Skin:     General: Skin is warm and dry.      Findings: No rash.      Nails: There is no clubbing.   Neurological:      Mental Status: He is alert and oriented to person, place, and time.      Gait: Gait normal.   Psychiatric:         Speech: Speech normal.         Behavior: Behavior normal.         Thought Content: Thought content normal.         Judgment: Judgment normal.         Chemistry        Component Value Date/Time     10/27/2021 2053    K 3.3 (L) 10/27/2021 2053    CL 99 10/27/2021 2053    CO2 26 10/27/2021 2053    BUN 19 10/27/2021 2053    CREATININE 1.3 10/27/2021 2053     (H) 10/27/2021 2053        Component Value Date/Time    CALCIUM 10.1 10/27/2021 2053    ALKPHOS 49 (L) 10/27/2021 2053    AST 25 10/27/2021 2053    ALT 26 10/27/2021 2053    BILITOT 0.7 10/27/2021 2053    ESTGFRAFRICA >60.0 10/27/2021 2053    EGFRNONAA 53.4 (A) 10/27/2021 2053        Lab Results   Component Value Date    HGBA1C 5.8 (H) 04/23/2021     Recent Labs   Lab 09/20/21  1202 09/20/21  1202 10/27/21  2053 10/27/21  2058   WBC 7.71   < > 10.51  --    Hemoglobin 16.1   < > 14.4  --    POC Hematocrit  --   --   --  43   Hematocrit 48.4   < > 41.9  --    MCV 86   < > 85  --    Platelets 226   < > 188  --    TSH 5.512 H  --   --   --    Cholesterol 206 H  --   --   --    HDL 51  --   --   --    LDL Cholesterol 119.8  --   --   --    Triglycerides 176 H  --   --   --    HDL/Cholesterol Ratio 24.8  --   --   --     < > = values in this interval not displayed.              Test(s) Reviewed  I have reviewed the following in detail:  [] Stress test   [] Angiography   [x] Echocardiogram   [x] Labs   [] Other:         Assessment/Plan:   1. New onset atrial fibrillation    We had a discussion regarding the diagnosis of AF and most common symptoms and risks associated with it  CHADSVASC 3 (age, HTN, DM).   Pt strongly encouraged to continue  xarelto 20 mg daily with largest meal of the day.  Pt has established and will follow up with EP. 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  Will update his labs as he has not had labs since April.   thyroid function (minimally elevated TSH, normal t4), electrolytes, and H/H within normal limits  Echo shows normal LV function    2. Atherosclerosis of aorta  Continue lipitor 40    3. Essential hypertension  BP goal <130/80.  Mildly elevated today but at goal at recent appointments. Continue to monitor and adjust meds if needed      4. Hyperlipidemia LDL goal <100  LDL not at goal <100.  Recent adjustment made to statin, continue lipitor 40    5. Pulmonary hypertension  F/up with sleep clinic regarding vinnie testing      6. Overweight (BMI 25.0-29.9)  BMI 28.7  Encouraged continued CV exercise for 30 minutes a day for 5 days a week.          Patient seen and examined with attending Cardiologist, Dr. Whatley, who agrees with management and plan.    Vega Ferrer MD  Cardiovascular Disease Fellow PGY V  Pager [phone]

## 2022-02-09 NOTE — PROGRESS NOTES
I have personally taken the history and examined this patient and agree with the Fellow's note as stated above.    Problems with AFib in past - now fatigued  Not aware of irreg/fast pulse on no BB  I think we need to know HR range to see if restarting BB indicated  Risk of stroke overall low

## 2022-02-22 ENCOUNTER — PATIENT MESSAGE (OUTPATIENT)
Dept: ADMINISTRATIVE | Facility: HOSPITAL | Age: 77
End: 2022-02-22
Payer: MEDICARE

## 2022-02-22 ENCOUNTER — OFFICE VISIT (OUTPATIENT)
Dept: OPTOMETRY | Facility: CLINIC | Age: 77
End: 2022-02-22
Payer: MEDICARE

## 2022-02-22 DIAGNOSIS — Z13.5 GLAUCOMA SCREENING: ICD-10-CM

## 2022-02-22 DIAGNOSIS — H25.13 NUCLEAR SCLEROSIS, BILATERAL: Primary | ICD-10-CM

## 2022-02-22 DIAGNOSIS — H52.4 PRESBYOPIA: ICD-10-CM

## 2022-02-22 PROCEDURE — 99999 PR PBB SHADOW E&M-EST. PATIENT-LVL II: ICD-10-PCS | Mod: PBBFAC,HCNC,, | Performed by: OPTOMETRIST

## 2022-02-22 PROCEDURE — 92015 PR REFRACTION: ICD-10-PCS | Mod: HCNC,S$GLB,, | Performed by: OPTOMETRIST

## 2022-02-22 PROCEDURE — 99999 PR PBB SHADOW E&M-EST. PATIENT-LVL II: CPT | Mod: PBBFAC,HCNC,, | Performed by: OPTOMETRIST

## 2022-02-22 PROCEDURE — 92014 COMPRE OPH EXAM EST PT 1/>: CPT | Mod: HCNC,S$GLB,, | Performed by: OPTOMETRIST

## 2022-02-22 PROCEDURE — 1159F MED LIST DOCD IN RCRD: CPT | Mod: HCNC,CPTII,S$GLB, | Performed by: OPTOMETRIST

## 2022-02-22 PROCEDURE — 1160F PR REVIEW ALL MEDS BY PRESCRIBER/CLIN PHARMACIST DOCUMENTED: ICD-10-PCS | Mod: HCNC,CPTII,S$GLB, | Performed by: OPTOMETRIST

## 2022-02-22 PROCEDURE — 1126F AMNT PAIN NOTED NONE PRSNT: CPT | Mod: HCNC,CPTII,S$GLB, | Performed by: OPTOMETRIST

## 2022-02-22 PROCEDURE — 1126F PR PAIN SEVERITY QUANTIFIED, NO PAIN PRESENT: ICD-10-PCS | Mod: HCNC,CPTII,S$GLB, | Performed by: OPTOMETRIST

## 2022-02-22 PROCEDURE — 1160F RVW MEDS BY RX/DR IN RCRD: CPT | Mod: HCNC,CPTII,S$GLB, | Performed by: OPTOMETRIST

## 2022-02-22 PROCEDURE — 1159F PR MEDICATION LIST DOCUMENTED IN MEDICAL RECORD: ICD-10-PCS | Mod: HCNC,CPTII,S$GLB, | Performed by: OPTOMETRIST

## 2022-02-22 PROCEDURE — 92015 DETERMINE REFRACTIVE STATE: CPT | Mod: HCNC,S$GLB,, | Performed by: OPTOMETRIST

## 2022-02-22 PROCEDURE — 92014 PR EYE EXAM, EST PATIENT,COMPREHESV: ICD-10-PCS | Mod: HCNC,S$GLB,, | Performed by: OPTOMETRIST

## 2022-02-22 NOTE — PROGRESS NOTES
HPI     EVELIN: 3/18/2019 - Dr. Monroe    Presents today for comprehensive eye exam.  Pt reports vision complaints - occ double vision.  No f/f  Systane gtts    Hemoglobin A1C       Date                     Value               Ref Range             Status                04/23/2021               5.8 (H)             4.0 - 5.6 %           Final                   03/12/2020               6.1 (H)             4.0 - 5.6 %           Final                   05/13/2019               5.9 (H)             4.0 - 5.6 %           Final                  Last edited by Marie Wang MA on 2/22/2022  8:46 AM. (History)        ROS     Positive for: Endocrine (DM), Eyes (trauma hx OS 1987)    Negative for: Constitutional, Gastrointestinal, Neurological, Skin,   Genitourinary, Musculoskeletal, HENT, Cardiovascular, Respiratory,   Psychiatric, Allergic/Imm, Heme/Lymph    Last edited by Luis Eduardo Tierney, OD on 2/22/2022  8:49 AM. (History)        Assessment /Plan     For exam results, see Encounter Report.    Nuclear sclerosis, bilateral    Glaucoma screening    Presbyopia      1. Reduced [facility] OS 2 to trauma 1987: k scarring/carlie/lag/exposure K.  Advised Ats QID/sleep mask at night to protect eye  2. Cat OU--wrote new spex Rx    PLAN:    rtc 1 yr

## 2022-03-02 DIAGNOSIS — I10 ESSENTIAL HYPERTENSION: ICD-10-CM

## 2022-03-02 RX ORDER — AMLODIPINE BESYLATE 10 MG/1
10 TABLET ORAL DAILY
Qty: 90 TABLET | Refills: 3 | Status: SHIPPED | OUTPATIENT
Start: 2022-03-02 | End: 2022-07-29 | Stop reason: SDUPTHER

## 2022-03-03 DIAGNOSIS — I10 HTN, GOAL BELOW 140/90: ICD-10-CM

## 2022-03-04 RX ORDER — HYDROCHLOROTHIAZIDE 25 MG/1
25 TABLET ORAL DAILY
Qty: 90 TABLET | Refills: 3 | Status: SHIPPED | OUTPATIENT
Start: 2022-03-04 | End: 2022-06-02 | Stop reason: SDUPTHER

## 2022-03-16 ENCOUNTER — PATIENT MESSAGE (OUTPATIENT)
Dept: ADMINISTRATIVE | Facility: HOSPITAL | Age: 77
End: 2022-03-16
Payer: MEDICARE

## 2022-03-17 DIAGNOSIS — I48.91 NEW ONSET ATRIAL FIBRILLATION: ICD-10-CM

## 2022-04-27 DIAGNOSIS — I10 ESSENTIAL HYPERTENSION: ICD-10-CM

## 2022-04-27 NOTE — TELEPHONE ENCOUNTER
Care Due:                  Date            Visit Type   Department     Provider  --------------------------------------------------------------------------------                                NP -                              PRIMARY      [facility]  Last Visit: 12-      CARE ([facility])   MEDICINE       Ye Dudley  Next Visit: None Scheduled  None         None Found                                                            Last  Test          Frequency    Reason                     Performed    Due Date  --------------------------------------------------------------------------------    HBA1C.......  6 months...  metFORMIN................  04-   10-    Powered by [email or web address]. Reference number: [id number].   4/27/2022 8:01:13 AM CDT

## 2022-04-28 RX ORDER — LOSARTAN POTASSIUM 25 MG/1
12.5 TABLET ORAL DAILY
Qty: 45 TABLET | Refills: 3 | Status: SHIPPED | OUTPATIENT
Start: 2022-04-28 | End: 2022-07-29 | Stop reason: SDUPTHER

## 2022-05-02 DIAGNOSIS — Z79.4 UNCONTROLLED TYPE 2 DIABETES MELLITUS WITH HYPERGLYCEMIA, WITH LONG-TERM CURRENT USE OF INSULIN: ICD-10-CM

## 2022-05-02 DIAGNOSIS — E11.65 UNCONTROLLED TYPE 2 DIABETES MELLITUS WITH HYPERGLYCEMIA, WITH LONG-TERM CURRENT USE OF INSULIN: ICD-10-CM

## 2022-05-02 DIAGNOSIS — R73.03 BORDERLINE DIABETES MELLITUS: ICD-10-CM

## 2022-05-02 RX ORDER — METFORMIN HYDROCHLORIDE 500 MG/1
TABLET ORAL
Qty: 180 TABLET | Refills: 0 | OUTPATIENT
Start: 2022-05-02

## 2022-05-02 RX ORDER — LANCETS
EACH MISCELLANEOUS
Qty: 200 EACH | Refills: 0 | OUTPATIENT
Start: 2022-05-02

## 2022-05-02 RX ORDER — DEXTROSE 4 G
TABLET,CHEWABLE ORAL
Refills: 0 | OUTPATIENT
Start: 2022-05-02

## 2022-05-02 NOTE — TELEPHONE ENCOUNTER
[place] DC. Inappropriate Request    Refill Authorization Note   Ezekiel Mueller  is requesting a refill authorization.  Brief Assessment and Rationale for Refill:  Quick Discontinue  Medication Therapy Plan:    Pharmacy is requesting new scripts for the following medications without required information, (sig/ frequency/qty/etc)       Medication Reconciliation Completed:  No      Comments: Pharmacies have been requesting medications for patients without required information, (sig, frequency, qty, etc.). In addition, requests are sent for medication(s) pt. are currently not taking, and medications patients have never taken.    We have spoken to the pharmacies about these request types and advised their teams previously that we are unable to assess these New Script requests and require all details for these requests. This is a known issue and has been reported.        Note composed:2:46 PM 05/02/2022

## 2022-05-02 NOTE — TELEPHONE ENCOUNTER
[place] DC. Inappropriate Request    Refill Authorization Note   Ezekiel Mueller  is requesting a refill authorization.  Brief Assessment and Rationale for Refill:  Quick Discontinue  Medication Therapy Plan:    Pharmacy is requesting new scripts for the following medications without required information, (sig/ frequency/qty/etc)       Medication Reconciliation Completed:  No      Comments: Pharmacies have been requesting medications for patients without required information, (sig, frequency, qty, etc.). In addition, requests are sent for medication(s) pt. are currently not taking, and medications patients have never taken.    We have spoken to the pharmacies about these request types and advised their teams previously that we are unable to assess these New Script requests and require all details for these requests. This is a known issue and has been reported.        Note composed:2:43 PM 05/02/2022

## 2022-05-02 NOTE — TELEPHONE ENCOUNTER
[place] DC. Inappropriate Request    Refill Authorization Note   Ezekiel Mueller  is requesting a refill authorization.  Brief Assessment and Rationale for Refill:  Quick Discontinue  Medication Therapy Plan:    Pharmacy is requesting new scripts for the following medications without required information, (sig/ frequency/qty/etc)       Medication Reconciliation Completed:  No      Comments: Pharmacies have been requesting medications for patients without required information, (sig, frequency, qty, etc.). In addition, requests are sent for medication(s) pt. are currently not taking, and medications patients have never taken.    We have spoken to the pharmacies about these request types and advised their teams previously that we are unable to assess these New Script requests and require all details for these requests. This is a known issue and has been reported.        Note composed:2:42 PM 05/02/2022

## 2022-05-02 NOTE — TELEPHONE ENCOUNTER
No new care gaps identified.  Powered by [employer]. Reference number: [id number].   5/02/2022 12:13:19 PM CDT

## 2022-05-02 NOTE — TELEPHONE ENCOUNTER
No new care gaps identified.  Powered by [employer]. Reference number: [id number].   5/02/2022 12:13:58 PM CDT

## 2022-05-02 NOTE — TELEPHONE ENCOUNTER
No new care gaps identified.  Powered by [employer] by [employer]. Reference number: [id number].   5/02/2022 12:12:52 PM CDT

## 2022-05-02 NOTE — TELEPHONE ENCOUNTER
[place]. Inappropriate Request    Refill Authorization Note   Ezekiel Mueller  is requesting a refill authorization.  Brief Assessment and Rationale for Refill:  Quick Discontinue  Medication Therapy Plan:    Pharmacy is requesting new scripts for the following medications without required information, (sig/ frequency/qty/etc)       Medication Reconciliation Completed:  No      Comments: Pharmacies have been requesting medications for patients without required information, (sig, frequency, qty, etc.). In addition, requests are sent for medication(s) pt. are currently not taking, and medications patients have never taken.    We have spoken to the pharmacies about these request types and advised their teams previously that we are unable to assess these New Script requests and require all details for these requests. This is a known issue and has been reported.        Note composed:2:44 PM 05/02/2022

## 2022-05-02 NOTE — TELEPHONE ENCOUNTER
No new care gaps identified.  Powered by [employer] by [employer] Reference number: [id number].   5/02/2022 12:17:20 PM CDT

## 2022-05-12 ENCOUNTER — TELEPHONE (OUTPATIENT)
Dept: INTERNAL MEDICINE | Facility: CLINIC | Age: 77
End: 2022-05-12

## 2022-05-12 DIAGNOSIS — R73.03 BORDERLINE DIABETES MELLITUS: ICD-10-CM

## 2022-05-12 DIAGNOSIS — I10 ESSENTIAL HYPERTENSION: ICD-10-CM

## 2022-05-12 RX ORDER — METFORMIN HYDROCHLORIDE 500 MG/1
500 TABLET ORAL 2 TIMES DAILY WITH MEALS
Qty: 180 TABLET | Refills: 3 | Status: SHIPPED | OUTPATIENT
Start: 2022-05-12 | End: 2022-07-29 | Stop reason: SDUPTHER

## 2022-05-12 RX ORDER — LOSARTAN POTASSIUM 25 MG/1
12.5 TABLET ORAL DAILY
Qty: 45 TABLET | Refills: 3 | Status: CANCELLED | OUTPATIENT
Start: 2022-05-12

## 2022-05-12 NOTE — TELEPHONE ENCOUNTER
Called patient and spoke with his wife about his medication refill. I informed patient's wife that his prescription request for losartan was already filled and good for 3 refills. Patient's wife informed me that metformin is what they actually need a refill on so I updated the prescription request.    Pending order, please advise

## 2022-05-12 NOTE — TELEPHONE ENCOUNTER
No new care gaps identified.  [facility] Embedded Care Gaps. Reference number: [id number]. 5/12/2022   2:46:26 PM CDT

## 2022-05-20 ENCOUNTER — HOSPITAL ENCOUNTER (EMERGENCY)
Facility: HOSPITAL | Age: 77
Discharge: HOME | End: 2022-05-20
Attending: EMERGENCY MEDICINE
Payer: MEDICARE

## 2022-05-20 VITALS
TEMPERATURE: 98 F | BODY MASS INDEX: 28.23 KG/M2 | HEART RATE: 53 BPM | OXYGEN SATURATION: 92 % | SYSTOLIC BLOOD PRESSURE: 123 MMHG | WEIGHT: 213 LBS | HEIGHT: 73 IN | DIASTOLIC BLOOD PRESSURE: 65 MMHG | RESPIRATION RATE: 19 BRPM

## 2022-05-20 DIAGNOSIS — M25.562 LEFT KNEE PAIN: ICD-10-CM

## 2022-05-20 DIAGNOSIS — M25.532 LEFT WRIST PAIN: ICD-10-CM

## 2022-05-20 DIAGNOSIS — T14.90XA TRAUMA: ICD-10-CM

## 2022-05-20 DIAGNOSIS — S29.9XXA CHEST TRAUMA: ICD-10-CM

## 2022-05-20 DIAGNOSIS — M79.605 LEFT LEG PAIN: ICD-10-CM

## 2022-05-20 DIAGNOSIS — M79.652 LEFT THIGH PAIN: ICD-10-CM

## 2022-05-20 DIAGNOSIS — S20.219A CONTUSION OF CHEST WALL, UNSPECIFIED LATERALITY, INITIAL ENCOUNTER: Primary | ICD-10-CM

## 2022-05-20 DIAGNOSIS — M25.522 LEFT ELBOW PAIN: ICD-10-CM

## 2022-05-20 LAB
ALBUMIN SERPL BCP-MCNC: 4.4 G/DL (ref 3.5–5.2)
ALP SERPL-CCNC: 45 U/L (ref 55–135)
ALT SERPL W/O P-5'-P-CCNC: 26 U/L (ref 10–44)
ANION GAP SERPL CALC-SCNC: 11 MMOL/L (ref 8–16)
AST SERPL-CCNC: 24 U/L (ref 10–40)
BASOPHILS # BLD AUTO: 0.05 K/UL (ref 0–0.2)
BASOPHILS NFR BLD: 0.7 % (ref 0–1.9)
BILIRUB SERPL-MCNC: 0.9 MG/DL (ref 0.1–1)
BILIRUB UR QL STRIP: NEGATIVE
BUN SERPL-MCNC: 19 MG/DL (ref 8–23)
CALCIUM SERPL-MCNC: 10.3 MG/DL (ref 8.7–10.5)
CHLORIDE SERPL-SCNC: 102 MMOL/L (ref 95–110)
CLARITY UR REFRACT.AUTO: ABNORMAL
CO2 SERPL-SCNC: 27 MMOL/L (ref 23–29)
COLOR UR AUTO: YELLOW
CREAT SERPL-MCNC: 1 MG/DL (ref 0.5–1.4)
DIFFERENTIAL METHOD: ABNORMAL
EOSINOPHIL # BLD AUTO: 0.2 K/UL (ref 0–0.5)
EOSINOPHIL NFR BLD: 2.7 % (ref 0–8)
ERYTHROCYTE [DISTWIDTH] IN BLOOD BY AUTOMATED COUNT: 12.9 % (ref 11.5–14.5)
EST. GFR  (AFRICAN AMERICAN): >60 ML/MIN/1.73 M^2
EST. GFR  (NON AFRICAN AMERICAN): >60 ML/MIN/1.73 M^2
GLUCOSE SERPL-MCNC: 123 MG/DL (ref 70–110)
GLUCOSE UR QL STRIP: NEGATIVE
HCT VFR BLD AUTO: 39.1 % (ref 40–54)
HGB BLD-MCNC: 13.7 G/DL (ref 14–18)
HGB UR QL STRIP: NEGATIVE
IMM GRANULOCYTES # BLD AUTO: 0.01 K/UL (ref 0–0.04)
IMM GRANULOCYTES NFR BLD AUTO: 0.1 % (ref 0–0.5)
KETONES UR QL STRIP: NEGATIVE
LEUKOCYTE ESTERASE UR QL STRIP: NEGATIVE
LYMPHOCYTES # BLD AUTO: 1.5 K/UL (ref 1–4.8)
LYMPHOCYTES NFR BLD: 20.7 % (ref 18–48)
MCH RBC QN AUTO: 28.9 PG (ref 27–31)
MCHC RBC AUTO-ENTMCNC: 35 G/DL (ref 32–36)
MCV RBC AUTO: 83 FL (ref 82–98)
MONOCYTES # BLD AUTO: 0.6 K/UL (ref 0.3–1)
MONOCYTES NFR BLD: 8 % (ref 4–15)
NEUTROPHILS # BLD AUTO: 5 K/UL (ref 1.8–7.7)
NEUTROPHILS NFR BLD: 67.8 % (ref 38–73)
NITRITE UR QL STRIP: NEGATIVE
NRBC BLD-RTO: 0 /100 WBC
PH UR STRIP: 5 [PH] (ref 5–8)
PLATELET # BLD AUTO: 193 K/UL (ref 150–450)
PMV BLD AUTO: 11.3 FL (ref 9.2–12.9)
POTASSIUM SERPL-SCNC: 3.5 MMOL/L (ref 3.5–5.1)
PROT SERPL-MCNC: 7.8 G/DL (ref 6–8.4)
PROT UR QL STRIP: NEGATIVE
RBC # BLD AUTO: 4.74 M/UL (ref 4.6–6.2)
SODIUM SERPL-SCNC: 140 MMOL/L (ref 136–145)
SP GR UR STRIP: 1.02 (ref 1–1.03)
TROPONIN I SERPL DL<=0.01 NG/ML-MCNC: 0.01 NG/ML (ref 0–0.03)
URN SPEC COLLECT METH UR: ABNORMAL
WBC # BLD AUTO: 7.36 K/UL (ref 3.9–12.7)

## 2022-05-20 PROCEDURE — 85025 COMPLETE CBC W/AUTO DIFF WBC: CPT | Performed by: EMERGENCY MEDICINE

## 2022-05-20 PROCEDURE — 99285 EMERGENCY DEPT VISIT HI MDM: CPT | Mod: 25

## 2022-05-20 PROCEDURE — 96374 THER/PROPH/DIAG INJ IV PUSH: CPT

## 2022-05-20 PROCEDURE — 81003 URINALYSIS AUTO W/O SCOPE: CPT | Performed by: EMERGENCY MEDICINE

## 2022-05-20 PROCEDURE — 25000003 PHARM REV CODE 250: Performed by: EMERGENCY MEDICINE

## 2022-05-20 PROCEDURE — 96375 TX/PRO/DX INJ NEW DRUG ADDON: CPT | Mod: 59

## 2022-05-20 PROCEDURE — 84484 ASSAY OF TROPONIN QUANT: CPT | Performed by: EMERGENCY MEDICINE

## 2022-05-20 PROCEDURE — 94761 N-INVAS EAR/PLS OXIMETRY MLT: CPT

## 2022-05-20 PROCEDURE — 93005 ELECTROCARDIOGRAM TRACING: CPT

## 2022-05-20 PROCEDURE — 80053 COMPREHEN METABOLIC PANEL: CPT | Performed by: EMERGENCY MEDICINE

## 2022-05-20 PROCEDURE — 99285 EMERGENCY DEPT VISIT HI MDM: CPT | Mod: ,,, | Performed by: EMERGENCY MEDICINE

## 2022-05-20 PROCEDURE — 63600175 PHARM REV CODE 636 W HCPCS: Performed by: EMERGENCY MEDICINE

## 2022-05-20 PROCEDURE — 93010 ELECTROCARDIOGRAM REPORT: CPT | Mod: ,,, | Performed by: INTERNAL MEDICINE

## 2022-05-20 PROCEDURE — 99285 PR EMERGENCY DEPT VISIT,LEVEL V: ICD-10-PCS | Mod: ,,, | Performed by: EMERGENCY MEDICINE

## 2022-05-20 PROCEDURE — 93010 EKG 12-LEAD: ICD-10-PCS | Mod: ,,, | Performed by: INTERNAL MEDICINE

## 2022-05-20 PROCEDURE — 94799 UNLISTED PULMONARY SVC/PX: CPT

## 2022-05-20 RX ORDER — ONDANSETRON 2 MG/ML
4 INJECTION INTRAMUSCULAR; INTRAVENOUS
Status: COMPLETED | OUTPATIENT
Start: 2022-05-20 | End: 2022-05-20

## 2022-05-20 RX ORDER — OXYCODONE HYDROCHLORIDE 5 MG/1
5 CAPSULE ORAL EVERY 4 HOURS PRN
Qty: 15 CAPSULE | Refills: 0 | Status: SHIPPED | OUTPATIENT
Start: 2022-05-20 | End: 2022-05-24

## 2022-05-20 RX ORDER — MORPHINE SULFATE 4 MG/ML
4 INJECTION, SOLUTION INTRAMUSCULAR; INTRAVENOUS
Status: COMPLETED | OUTPATIENT
Start: 2022-05-20 | End: 2022-05-20

## 2022-05-20 RX ORDER — ACETAMINOPHEN 500 MG
1000 TABLET ORAL
Status: COMPLETED | OUTPATIENT
Start: 2022-05-20 | End: 2022-05-20

## 2022-05-20 RX ADMIN — ACETAMINOPHEN 1000 MG: 500 TABLET ORAL at 09:05

## 2022-05-20 RX ADMIN — MORPHINE SULFATE 4 MG: 4 INJECTION INTRAVENOUS at 09:05

## 2022-05-20 RX ADMIN — ONDANSETRON 4 MG: 2 INJECTION INTRAMUSCULAR; INTRAVENOUS at 09:05

## 2022-05-20 NOTE — DISCHARGE INSTRUCTIONS
Your CT scans and x-rays did not show any serious injuries today.  However your likely to have a lot of pain as you have multiple areas of contusions.  I recommend that you take Tylenol 650 mg every 6 hours for pain and takes oxycodone in addition to this as needed for severe pain.  I recommend that you take a stool softener while taking oxycodone and do not drive, drink alcohol, or take any other sedating medications while taking oxycodone.    I recommend that you use incentive spirometer once an hour to help prevent pneumonia.    Please follow-up with your primary doctor for re-evaluation and return to the ER if you have new or worsening symptoms, difficulty breathing, fever, new or worsening pain, or for any other concern.

## 2022-05-20 NOTE — ED NOTES
"Pt presents to ED via personal vehicle w/ c/o of blunt injury. Pt is AAOx4. RR is even, unlabored and spontaneous. Pt is ambulatory. Pt reports he was working on his truck this morning and the sameer shifted resulting in the backside of his truck landing on top of him. Pt states he was under the truck "for about a minute and a half" and his wife used the sameer to pick the truck up off of him and get him out. Pt has edema present on left upper extremity, abrasion across chest, abrasion on right side of back, and multiple small wounds along lower extremities. Pt reports pain on palpation of chest and left upper extremity. Pt has PMH of a-fib and is on blood thinners. Pt placed on continuous cardiac monitor, BP cuff, and pulse ox. Family member at bedside. Bed low and locked; side rails up x2; call light within reach. Will continue to monitor.    Patient identifiers for Ezekiel Mueller 76 y.o. male checked and correct.  Chief Complaint   Patient presents with    muliple complaints     Pt states he was working on his truck and the sameer shifted. Pt reports the truck fell on him. Pt states pain to the left chest, left arm, left leg. Pt denies difficulty breathing. Pt ambulatory.     Past Medical History:   Diagnosis Date    Basal cell carcinoma 02/2015    R eyelid    Borderline diabetes 2016    [facility]ps     [facility]    Elevated blood uric acid level 2016    Fatty liver     Hypertension     diagnosed 2005    Pancreatitis 07/2015    etiology unclear     Allergies reported: Review of patient's allergies indicates:  No Known Allergies      "
30-Nov-2019 10:24

## 2022-05-20 NOTE — ED PROVIDER NOTES
Encounter Date: 5/20/2022       History     Chief Complaint   Patient presents with    muliple complaints     Pt states he was working on his truck and the sameer shifted. Pt reports the truck fell on him. Pt states pain to the left chest, left arm, left leg. Pt denies difficulty breathing. Pt ambulatory.     Patient is a 76-year-old male with past medical history of atrial fibrillation on Xarelto, basal cell carcinoma, hypertension, pancreatitis, borderline diabetes who presents after his large [facility] truck fell off the sameer landing on him and pinning him under the truck. He was working on the back of the truck and it landed on his chest abdomen, pelvis, legs and left arm. His head, neck and right arm were free and clear. His wife was able to sameer the truck back up and he was able to get out from under the truck. They presented by private vehicle and patient is able to ambulate. He complains of pain to his left arm, chest, R groin, left forearm, and left leg. Pt denies weakness, numbness. He denies shortness of breath  Last tetanus was about 10 years ago    The history is provided by the patient and the spouse.     Review of patient's allergies indicates:  No Known Allergies  Past Medical History:   Diagnosis Date    Basal cell carcinoma 02/2015    R eyelid    Borderline diabetes 2016    [facility]    Elevated blood uric acid level 2016    Fatty liver     Hypertension     diagnosed 2005    Pancreatitis 07/2015    etiology unclear     Past Surgical History:   Procedure Laterality Date    CHOLECYSTECTOMY  2012    COLONOSCOPY      x2    COLONOSCOPY N/A 7/30/2021    Procedure: COLONOSCOPY;  Surgeon: Hector Berry MD;  Location: [address]);  Service: Endoscopy;  Laterality: N/A;  fully vaccinated-see immunization record    SKIN CANCER EXCISION       Family History   Problem Relation Age of Onset    Diabetes Mother     Heart disease Mother      Hypertension Mother     Emphysema Father     No Known Problems Sister     No Known Problems Maternal Aunt     No Known Problems Maternal Uncle     No Known Problems Paternal Aunt     No Known Problems Paternal Uncle     No Known Problems Maternal Grandmother     No Known Problems Maternal Grandfather     No Known Problems Paternal Grandmother     No Known Problems Paternal Grandfather     No Known Problems Daughter     No Known Problems Son     No Known Problems Son     No Known Problems Daughter     Blindness Neg Hx     Glaucoma Neg Hx     Macular degeneration Neg Hx     Retinal detachment Neg Hx     Amblyopia Neg Hx     Cancer Neg Hx     Cataracts Neg Hx     Strabismus Neg Hx     Stroke Neg Hx     Thyroid disease Neg Hx     Melanoma Neg Hx      Social History     Tobacco Use    Smoking status: Never Smoker    Smokeless tobacco: Never Used   Substance Use Topics    Alcohol use: Not Currently    Drug use: No     Review of Systems  General: No fever.  No chills.  Head: No headache.  No loss of consciousness or amnesia.  Neck: No neck pain.  Back: No back pain.  Extremities: + extremity pain.  Chest: No shortness of breath.  + chest pain.  Cardiovascular: No palpitations.  Abdomen: + abdominal pain.  No nausea or vomiting.  Integument: No rashes or bruising.  Eyes: No visual changes.  Urinary: No hematuria.  Neurologic: No numbness.  No focal weakness.      Physical Exam     Initial Vitals   BP Pulse Resp Temp SpO2   05/20/22 0838 05/20/22 0838 05/20/22 0838 05/20/22 1001 05/20/22 0838   (!) 146/75 60 18 98.2 °F (36.8 °C) 98 %      MAP       --                Physical Exam    Nursing note and vitals reviewed.  Constitutional: He appears well-developed and well-nourished. He is not diaphoretic. No distress.   HENT:   Head: Normocephalic and atraumatic.   Eyes: Conjunctivae and EOM are normal.   Neck: Neck supple.   Normal range of motion.  Cardiovascular: Normal rate, regular rhythm  and intact distal pulses.   Pulmonary/Chest: No respiratory distress.   Abdominal: Abdomen is soft. There is abdominal tenderness.   Musculoskeletal:      Cervical back: Normal range of motion and neck supple.      Comments: Tender to L thigh, R groin, anterior chest wall, R upper back, abdomen     Neurological: He is alert and oriented to person, place, and time. GCS score is 15. GCS eye subscore is 4. GCS verbal subscore is 5. GCS motor subscore is 6.   Skin: Skin is warm and dry.   Redness and bruising to anterior chest wall and R upper back, L elbow, forearm, hand   Bruising and abrasion to anterior L shin, anterior l knee       Psychiatric: He has a normal mood and affect. His behavior is normal. Judgment and thought content normal.         ED Course   Procedures  Labs Reviewed   CBC W/ AUTO DIFFERENTIAL - Abnormal; Notable for the following components:       Result Value    Hemoglobin 13.7 (*)     Hematocrit 39.1 (*)     All other components within normal limits   COMPREHENSIVE METABOLIC PANEL - Abnormal; Notable for the following components:    Glucose 123 (*)     Alkaline Phosphatase 45 (*)     All other components within normal limits   URINALYSIS, REFLEX TO URINE CULTURE - Abnormal; Notable for the following components:    Appearance, UA Hazy (*)     All other components within normal limits    Narrative:     Specimen Source->Urine   TROPONIN I     EKG Readings: (Independently Interpreted)   Initial Reading: No STEMI. Previous EKG: Compared with most recent EKG   NSR rate of 60 RBBB and LAFB     ECG Results          EKG 12-lead (Final result)  Result time 05/20/22 15:01:51    Final result by Interface, Lab In [facility] (05/20/22 15:01:51)                 Narrative:    Test Reason : S29.9XXA,    Vent. Rate : 060 BPM     Atrial Rate : 060 BPM     P-R Int : 200 ms          QRS Dur : 154 ms      QT Int : 460 ms       P-R-T Axes : 032 -70 023 degrees     QTc Int : 460 ms    Normal sinus rhythm  Right bundle  branch block  Left anterior fascicular block   Bifascicular block   Abnormal ECG  When compared with ECG of 04-FEB-2022 07:25,  No significant change was found  Confirmed by Zana Castle MD ([id number]) on 5/20/2022 3:01:39 PM    Referred By: LA NENA   SELF           Confirmed By:David Castle MD                            Imaging Results          X-Ray Femur AP/LAT Left (Final result)  Result time 05/20/22 11:26:54    Final result by Angelo Shrestha Jr., MD (05/20/22 11:26:54)                 Impression:      No acute fracture identified.    Metallic BB density medial soft tissues left thigh      Electronically signed by: Angelo Shrestha MD  Date:    05/20/2022  Time:    11:26             Narrative:    EXAMINATION:  XR FEMUR 2 VIEW LEFT    CLINICAL HISTORY:  Pain in left thigh    TECHNIQUE:  AP and lateral views of the left femur were performed.    COMPARISON:  None}    FINDINGS:  Femur is well mineralized and intact.  Vascular calcifications are noted.  Metallic density medial soft tissues mid thigh.  No convincing fracture.                               X-Ray Knee 3 View Left (Final result)  Result time 05/20/22 11:21:32    Final result by Angelo Shrestha Jr., MD (05/20/22 11:21:32)                 Impression:      No acute abnormality.      Electronically signed by: Angelo Shrestha MD  Date:    05/20/2022  Time:    11:21             Narrative:    EXAMINATION:  XR KNEE 3 VIEW LEFT    CLINICAL HISTORY:  Pain in left knee    TECHNIQUE:  AP, lateral, and Merchant views of the left knee were performed.    COMPARISON:  None    FINDINGS:  Bones are well mineralized.  Alignment is satisfactory.  No fracture or effusion.  Vascular calcifications noted.  Joint spaces are fairly well maintained.                               X-Ray Elbow Complete Left (Final result)  Result time 05/20/22 11:28:37    Final result by Isma Gonzalez MD (05/20/22 11:28:37)                 Impression:      1. No acute displaced fractures.  2.  Distal triceps enthesophytes.  3. Soft tissue swelling overlying the olecranon.      Electronically signed by: Isma Gonzalez MD  Date:    05/20/2022  Time:    11:28             Narrative:    EXAMINATION:  XR ELBOW COMPLETE 3 VIEW LEFT    CLINICAL HISTORY:  Pain in left elbow    TECHNIQUE:  AP, lateral, and oblique views of the left elbow were performed.    COMPARISON:  None.    FINDINGS:  Osseous mineralization is preserved.  No acute displaced fractures.  No suspicious lytic or blastic lesions.  Radiocapitellar and ulnohumeral cartilage spaces are preserved.  No subluxation or dislocation.  Distal triceps enthesophyte.  There is mild soft tissue thickening overlying the olecranon.                               X-Ray Tibia Fibula 2 View Left (Final result)  Result time 05/20/22 11:27:01    Final result by Walt Dickey MD (05/20/22 11:27:01)                 Impression:      As above.      Electronically signed by: Walt Dickey  Date:    05/20/2022  Time:    11:27             Narrative:    EXAMINATION:  XR TIBIA FIBULA 2 VIEW LEFT    CLINICAL HISTORY:  Trauma and pain    TECHNIQUE:  AP and lateral views of the left tibia and fibula were performed.    COMPARISON:  None.    FINDINGS:  No acute fractures of the left tibia or fibula and no bone lesions or bone destruction.  Suggested soft tissue swelling seen on the lateral exam anterior to the proximal tibial shaft.  No opaque soft tissue foreign body.  Preserved tibiofemoral and patellofemoral articulations based on these images with some minimal spurring about the patellofemoral articulation.  The ankle joint appears preserved based on these images with question mild bimalleolar soft tissue swelling.                               X-Ray Hand 3 View Left (Final result)  Result time 05/20/22 11:25:08    Final result by Walt Dickey MD (05/20/22 11:25:08)                 Impression:      No acute fractures, left hand findings as described and unchanged when  compared to earlier exam      Electronically signed by: Walt Dickey  Date:    05/20/2022  Time:    11:25             Narrative:    EXAMINATION:  XR HAND COMPLETE 3 VIEW LEFT    CLINICAL HISTORY:  left wrist pain;.    TECHNIQUE:  PA, lateral, and oblique views of the left hand were performed.    COMPARISON:  Left hand of May 14, 2021    FINDINGS:  No acute fractures.  Stable osteoarthritic changes about the 1st carpal metacarpal articulation.  Stable appearance of numerous variable size periarticular soft tissue calcification-ossification foci about the 1st MCP articulation.  Stable appearance of hook like osteophyte arising from the radial side of the distal diametaphyseal region of the 2nd metacarpal as well as fragmented appearance of the ulna or side of the 2nd metacarpal head.  Stable subarticular cyst involving the radial side of the base of the middle phalanx of the 3rd finger.  Some stable scattered finger IP degenerative periarticular spurring.  Minimal soft tissue swelling dorsally at the level of the MCP articulations.                               X-Ray Forearm Left (Final result)  Result time 05/20/22 11:18:20    Final result by Angelo Shrestha Jr., MD (05/20/22 11:18:20)                 Impression:      See above      Electronically signed by: Angelo Shrestha MD  Date:    05/20/2022  Time:    11:18             Narrative:    EXAMINATION:  XR FOREARM LEFT    TECHNIQUE:  AP and lateral views of the left forearm were performed.    COMPARISON:  None    FINDINGS:  Soft tissue calcifications about the olecranon.  Otherwise bones are well mineralized alignment is satisfactory.  Vascular calcifications are noted.  No acute fracture seen.  Degenerative change 1st carpometacarpal joint incidentally noted.                               X-Ray Wrist Complete Left (Final result)  Result time 05/20/22 11:22:10    Final result by Walt Dickey MD (05/20/22 11:22:10)                 Impression:      As  above.      Electronically signed by: Walt Dickey  Date:    05/20/2022  Time:    11:22             Narrative:    EXAMINATION:  XR WRIST COMPLETE 3 VIEWS LEFT    CLINICAL HISTORY:  Trauma, pain    TECHNIQUE:  PA, lateral, and oblique views of the left wrist were performed.    COMPARISON:  Present exam interpreted after review of bilateral hand study May 14, 2021    FINDINGS:  No acute fractures.  Stable 1st carpal metacarpal osteoarthritic changes.  Otherwise, preserved joint spaces.  Preserved bone density.  Minimal soft tissue swelling dorsally at the level of the wrist and metacarpals based on lateral exam.  Multiple stable appearing variable size periarticular soft tissue ossifications projecting about the 1st MCP articulation.                               X-Ray Pelvis Routine AP (Final result)  Result time 05/20/22 11:16:21    Final result by Angelo Shrestha Jr., MD (05/20/22 11:16:21)                 Impression:      No acute abnormality identified.  Degenerative change noted.      Electronically signed by: Angelo Shrestha MD  Date:    05/20/2022  Time:    11:16             Narrative:    EXAMINATION:  XR PELVIS ROUTINE AP    CLINICAL HISTORY:  Injury, unspecified, initial encounter    TECHNIQUE:  AP view of the pelvis was performed.    COMPARISON:  None.    FINDINGS:  Bones are well mineralized alignment is satisfactory.  No displaced pelvic fracture seen.                                X-Ray Chest AP Portable (Final result)  Result time 05/20/22 11:14:30    Final result by Angelo Shrestha Jr., MD (05/20/22 11:14:30)                 Impression:      Abnormal study with some deformity about the lateral aspect the left 7th rib as well as approximate 2 cm soft tissue nodular opacity the same location.  No pneumothorax.  Additional evaluation with dedicated left rib films or CT chest may be helpful.    This report was flagged in Epic as abnormal.      Electronically signed by: Angelo Shrestha  MD  Date:    05/20/2022  Time:    11:14             Narrative:    EXAMINATION:  XR CHEST AP PORTABLE    CLINICAL HISTORY:  trauma;    TECHNIQUE:  Single frontal view of the chest was performed.    COMPARISON:  July 2015    FINDINGS:  There is elevation of the left hemidiaphragm.  Lungs are hypoaerated.  Heart is mildly enlarged.  Pulmonary vasculature not engorged allowing for the hypoaeration.  Vague approximate 2 cm nodular opacity overlies the lateral aspect of the left heart border.  There is some deformity of the left 7th rib in this region as well.  No pneumothorax.  Some hypertrophic new bone about the left cortical clavicular region similar.                               CT Chest Abdomen Pelvis Without Contrast (XPD) (Final result)  Result time 05/20/22 10:19:02   Procedure changed from CT Chest Without Contrast     Final result by Collin Martel MD (05/20/22 10:19:02)                 Impression:      1. No definite evidence of acute traumatic findings in the chest, abdomen, or pelvis.  2. Nonobstructing 2 mm calculus lower pole right kidney.  3. Additional details as provided in the body of report.      Electronically signed by: Collin Martel  Date:    05/20/2022  Time:    10:19             Narrative:    EXAMINATION:  CT CHEST ABDOMEN PELVIS WITHOUT CONTRAST(XPD)    CLINICAL HISTORY:  Chest trauma, blunt;    TECHNIQUE:  Low dose axial images, sagittal and coronal reformations were obtained from the thoracic inlet to the pubic synthesis    COMPARISON:  CT of the abdomen pelvis performed 10/27/2021.    FINDINGS:    CHEST    Support tubes and lines: None.    Aorta: Normal caliber.  Atherosclerotic calcifications.  Left-sided aortic arch.    Heart: Grossly normal size.  Valvular calcifications.    Coronary arteries: Coronary calcifications identified.    Pericardium: No effusion, thickening, or calcification.    Central pulmonary arteries: Normal caliber.    Base of neck/thyroid: Unremarkable.    Lymph  nodes: No supraclavicular, axillary, internal mammary, mediastinal, or hilar adenopathy.    Esophagus: Unremarkable.    Pleura: No effusion, thickening, or calcification.    Body wall: Unremarkable.    Airways: Central airways appear patent.    Lungs: Limited assessment of the lungs due to respiratory motion.  Dependent atelectasis suggested.    Pneumothorax: None.    Bones:Chronic appearing deformity of the left 7th rib.  Chronic appearing deformity of the distal left clavicle.    ABDOMEN/PELVIS    Abdominal aorta: Normal calibre.  Vascular calcifications.    Inferior vena cava: Normal in caliber.    Free fluid/free air: None.    Liver: Intact.    Gallbladder and bile ducts: Gallbladder surgically absent.  No biliary dilatation.    Pancreas:Intact.    Spleen: Intact.    Adrenals: Intact.    Kidneys: Renal cysts bilaterally.  Nonobstructing calculus measuring up to 2 mm lower pole right kidney.    Bowel and mesentery: The presence of a duodenal diverticulum is questioned.  No definite evidence of acute bowel obstruction.  Colonic diverticulosis is identified without definite evidence of acute diverticulitis.  Normal caliber appendix.    Urinary bladder: Intact.    Body wall: Bilateral fat containing inguinal hernias.    Bones: No definite acute findings.  Osseous demineralization is suggested.  Degenerative findings involving the spine and hips.                                 Medications   acetaminophen tablet 1,000 mg (1,000 mg Oral Given 5/20/22 0951)   ondansetron injection 4 mg (4 mg Intravenous Given 5/20/22 0951)   morphine injection 4 mg (4 mg Intravenous Given 5/20/22 0952)     Medical Decision Making:   History:   Old Medical Records: I decided to obtain old medical records.  Old Records Summarized: records from clinic visits.  Differential Diagnosis:   Fractures, sprain, intra-abdominal bleeding, pulmonary contusion  Clinical Tests:   Lab Tests: Ordered and Reviewed  Radiological Study: Ordered and  Reviewed  ED Management:  Multiple contusions bruising but overall well appearing considering   He is not hypoxic  Abdomen wo peritonitis  No acute injury seen on CT  Patient ambulatory    IS to prevent pneumonia  Patient in agreement with discharge                      Clinical Impression:   Final diagnoses:  [S29.9XXA] Chest trauma  [T14.90XA] Trauma  [M25.532] Left wrist pain  [M79.605] Left leg pain  [M25.522] Left elbow pain  [M25.562] Left knee pain  [M79.652] Left thigh pain  [S20.219A] Contusion of chest wall, unspecified laterality, initial encounter (Primary)          ED Disposition Condition    Discharge Stable        ED Prescriptions     Medication Sig Dispense Start Date End Date Auth. Provider    oxyCODONE (OXY-IR) 5 mg Cap Take 1 capsule (5 mg total) by mouth every 4 (four) hours as needed for Pain. 15 capsule 5/20/2022 5/23/2022 Zora Grady MD        Follow-up Information     Follow up With Specialties Details Why Contact Info    [facility] - Emergency Dept Emergency Medicine  As needed, If symptoms worsen [address] [facility] [id number] [phone]    Ye Dudley MD Internal Medicine   [address]  [place] [address]  [phone]             Zora Grady MD  05/20/22 [id number]

## 2022-05-24 ENCOUNTER — PATIENT MESSAGE (OUTPATIENT)
Dept: ADMINISTRATIVE | Facility: HOSPITAL | Age: 77
End: 2022-05-24
Payer: MEDICARE

## 2022-05-26 DIAGNOSIS — E11.9 TYPE 2 DIABETES MELLITUS WITHOUT COMPLICATION, UNSPECIFIED WHETHER LONG TERM INSULIN USE: ICD-10-CM

## 2022-05-30 ENCOUNTER — PATIENT MESSAGE (OUTPATIENT)
Dept: ADMINISTRATIVE | Facility: HOSPITAL | Age: 77
End: 2022-05-30
Payer: MEDICARE

## 2022-06-02 DIAGNOSIS — I10 HTN, GOAL BELOW 140/90: ICD-10-CM

## 2022-06-02 DIAGNOSIS — I70.0 ATHEROSCLEROSIS OF AORTA: ICD-10-CM

## 2022-06-02 DIAGNOSIS — E78.5 HYPERLIPIDEMIA LDL GOAL <100: ICD-10-CM

## 2022-06-02 DIAGNOSIS — I48.91 NEW ONSET ATRIAL FIBRILLATION: ICD-10-CM

## 2022-06-02 NOTE — TELEPHONE ENCOUNTER
No new care gaps identified.  [facility] Embedded Care Gaps. Reference number: [id number]. 6/02/2022   7:09:12 AM CDT

## 2022-06-02 NOTE — TELEPHONE ENCOUNTER
Refill Routing Note   Medication(s) are not appropriate for processing by [facility] for the following reason(s):      - Patient has been seen in the ED/Hospital since the last PCP visit    [facility] action(s):  Defer          Medication reconciliation completed: No     Appointments  past 12m or future 3m with PCP    Date Provider   Last Visit   12/27/2021 Ye Dudley MD   Next Visit   Visit date not found eY Dudley MD   ED visits in past 90 days: 1        Note composed:12:27 PM 06/02/2022

## 2022-06-03 RX ORDER — HYDROCHLOROTHIAZIDE 25 MG/1
25 TABLET ORAL DAILY
Qty: 90 TABLET | Refills: 3 | Status: SHIPPED | OUTPATIENT
Start: 2022-06-03 | End: 2022-07-29 | Stop reason: SDUPTHER

## 2022-06-12 RX ORDER — ATORVASTATIN CALCIUM 40 MG/1
40 TABLET, FILM COATED ORAL DAILY
Qty: 90 TABLET | Refills: 3 | Status: SHIPPED | OUTPATIENT
Start: 2022-06-12 | End: 2022-08-20 | Stop reason: SDUPTHER

## 2022-07-09 ENCOUNTER — HOSPITAL ENCOUNTER (EMERGENCY)
Facility: HOSPITAL | Age: 77
Discharge: HOME | End: 2022-07-09
Attending: EMERGENCY MEDICINE
Payer: MEDICARE

## 2022-07-09 VITALS
RESPIRATION RATE: 18 BRPM | TEMPERATURE: 98 F | OXYGEN SATURATION: 96 % | SYSTOLIC BLOOD PRESSURE: 160 MMHG | HEART RATE: 60 BPM | DIASTOLIC BLOOD PRESSURE: 74 MMHG

## 2022-07-09 DIAGNOSIS — R07.89 CHEST WALL DISCOMFORT: ICD-10-CM
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
## 2022-07-09 DIAGNOSIS — S09.90XA INJURY OF HEAD, INITIAL ENCOUNTER: ICD-10-CM

## 2022-07-09 DIAGNOSIS — T14.8XXA ABRASION: ICD-10-CM

## 2022-07-09 LAB
BASOPHILS # BLD AUTO: 0.04 K/UL (ref 0–0.2)
BASOPHILS NFR BLD: 0.3 % (ref 0–1.9)
BILIRUB UR QL STRIP: NEGATIVE
BUN SERPL-MCNC: 21 MG/DL (ref 6–30)
CHLORIDE SERPL-SCNC: 100 MMOL/L (ref 95–110)
CLARITY UR REFRACT.AUTO: CLEAR
COLOR UR AUTO: YELLOW
CREAT SERPL-MCNC: 1 MG/DL (ref 0.5–1.4)
DIFFERENTIAL METHOD: ABNORMAL
EOSINOPHIL # BLD AUTO: 0.2 K/UL (ref 0–0.5)
EOSINOPHIL NFR BLD: 1.1 % (ref 0–8)
ERYTHROCYTE [DISTWIDTH] IN BLOOD BY AUTOMATED COUNT: 13.1 % (ref 11.5–14.5)
GLUCOSE SERPL-MCNC: 134 MG/DL (ref 70–110)
GLUCOSE UR QL STRIP: NEGATIVE
HCT VFR BLD AUTO: 40.4 % (ref 40–54)
HCT VFR BLD CALC: 40 %PCV (ref 36–54)
HGB BLD-MCNC: 13.5 G/DL (ref 14–18)
HGB UR QL STRIP: NEGATIVE
IMM GRANULOCYTES # BLD AUTO: 0.08 K/UL (ref 0–0.04)
IMM GRANULOCYTES NFR BLD AUTO: 0.6 % (ref 0–0.5)
INR PPP: 1.3 (ref 0.8–1.2)
KETONES UR QL STRIP: ABNORMAL
LEUKOCYTE ESTERASE UR QL STRIP: NEGATIVE
LYMPHOCYTES # BLD AUTO: 1.4 K/UL (ref 1–4.8)
LYMPHOCYTES NFR BLD: 9.9 % (ref 18–48)
MCH RBC QN AUTO: 29.3 PG (ref 27–31)
MCHC RBC AUTO-ENTMCNC: 33.4 G/DL (ref 32–36)
MCV RBC AUTO: 88 FL (ref 82–98)
MONOCYTES # BLD AUTO: 0.7 K/UL (ref 0.3–1)
MONOCYTES NFR BLD: 5.4 % (ref 4–15)
NEUTROPHILS # BLD AUTO: 11.3 K/UL (ref 1.8–7.7)
NEUTROPHILS NFR BLD: 82.7 % (ref 38–73)
NITRITE UR QL STRIP: NEGATIVE
NRBC BLD-RTO: 0 /100 WBC
PH UR STRIP: 5 [PH] (ref 5–8)
PLATELET # BLD AUTO: 194 K/UL (ref 150–450)
PMV BLD AUTO: 11.4 FL (ref 9.2–12.9)
POC IONIZED CALCIUM: 1.26 MMOL/L (ref 1.06–1.42)
POC TCO2 (MEASURED): 28 MMOL/L (ref 23–29)
POTASSIUM BLD-SCNC: 3.9 MMOL/L (ref 3.5–5.1)
PROT UR QL STRIP: NEGATIVE
PROTHROMBIN TIME: 13 SEC (ref 9–12.5)
RBC # BLD AUTO: 4.61 M/UL (ref 4.6–6.2)
SAMPLE: ABNORMAL
SODIUM BLD-SCNC: 141 MMOL/L (ref 136–145)
SP GR UR STRIP: >=1.03 (ref 1–1.03)
URN SPEC COLLECT METH UR: ABNORMAL
WBC # BLD AUTO: 13.68 K/UL (ref 3.9–12.7)

## 2022-07-09 PROCEDURE — 99284 PR EMERGENCY DEPT VISIT,LEVEL IV: ICD-10-PCS | Mod: ,,, | Performed by: EMERGENCY MEDICINE

## 2022-07-09 PROCEDURE — 85610 PROTHROMBIN TIME: CPT | Performed by: EMERGENCY MEDICINE

## 2022-07-09 PROCEDURE — 96375 TX/PRO/DX INJ NEW DRUG ADDON: CPT

## 2022-07-09 PROCEDURE — 25500020 PHARM REV CODE 255: Performed by: EMERGENCY MEDICINE

## 2022-07-09 PROCEDURE — 63600175 PHARM REV CODE 636 W HCPCS: Performed by: EMERGENCY MEDICINE

## 2022-07-09 PROCEDURE — 99284 EMERGENCY DEPT VISIT MOD MDM: CPT | Mod: ,,, | Performed by: EMERGENCY MEDICINE

## 2022-07-09 PROCEDURE — 96376 TX/PRO/DX INJ SAME DRUG ADON: CPT | Mod: 59

## 2022-07-09 PROCEDURE — 87502 INFLUENZA DNA AMP PROBE: CPT

## 2022-07-09 PROCEDURE — 99285 EMERGENCY DEPT VISIT HI MDM: CPT | Mod: 25

## 2022-07-09 PROCEDURE — 81003 URINALYSIS AUTO W/O SCOPE: CPT | Performed by: EMERGENCY MEDICINE

## 2022-07-09 PROCEDURE — 85025 COMPLETE CBC W/AUTO DIFF WBC: CPT | Performed by: EMERGENCY MEDICINE

## 2022-07-09 PROCEDURE — 96374 THER/PROPH/DIAG INJ IV PUSH: CPT | Mod: 59

## 2022-07-09 RX ORDER — ONDANSETRON 2 MG/ML
4 INJECTION INTRAMUSCULAR; INTRAVENOUS
Status: COMPLETED | OUTPATIENT
Start: 2022-07-09 | End: 2022-07-09

## 2022-07-09 RX ORDER — OXYCODONE HYDROCHLORIDE 5 MG/1
5 TABLET ORAL EVERY 6 HOURS PRN
Qty: 5 TABLET | Refills: 0 | Status: SHIPPED | OUTPATIENT
Start: 2022-07-09 | End: 2022-07-18

## 2022-07-09 RX ORDER — ONDANSETRON 4 MG/1
4 TABLET, FILM COATED ORAL EVERY 6 HOURS
Qty: 12 TABLET | Refills: 0 | Status: SHIPPED | OUTPATIENT
Start: 2022-07-09

## 2022-07-09 RX ORDER — MORPHINE SULFATE 2 MG/ML
6 INJECTION, SOLUTION INTRAMUSCULAR; INTRAVENOUS
Status: COMPLETED | OUTPATIENT
Start: 2022-07-09 | End: 2022-07-09

## 2022-07-09 RX ORDER — ONDANSETRON 2 MG/ML
4 INJECTION INTRAMUSCULAR; INTRAVENOUS
Status: DISCONTINUED | OUTPATIENT
Start: 2022-07-09 | End: 2022-07-09 | Stop reason: HOSPADM

## 2022-07-09 RX ORDER — MORPHINE SULFATE 4 MG/ML
4 INJECTION, SOLUTION INTRAMUSCULAR; INTRAVENOUS
Status: COMPLETED | OUTPATIENT
Start: 2022-07-09 | End: 2022-07-09

## 2022-07-09 RX ADMIN — ONDANSETRON 4 MG: 2 INJECTION INTRAMUSCULAR; INTRAVENOUS at 11:07

## 2022-07-09 RX ADMIN — MORPHINE SULFATE 4 MG: 4 INJECTION INTRAVENOUS at 11:07

## 2022-07-09 RX ADMIN — IOHEXOL 100 ML: 350 INJECTION, SOLUTION INTRAVENOUS at 02:07

## 2022-07-09 RX ADMIN — MORPHINE SULFATE 6 MG: 2 INJECTION, SOLUTION INTRAMUSCULAR; INTRAVENOUS at 02:07

## 2022-07-09 NOTE — ED PROVIDER NOTES
Encounter Date: 7/9/2022       History     Chief Complaint   Patient presents with    Fall     Arrived via esm from home, from 4ft ladder in shed, hit a support beam then ground, ambulatory after fall, c/o mid upper back and rib pain, tender, + head injury, denies head injury, blood to left ear, possible lac, pt on blood thinners, abrasion also to right forearm, pupils and eye movement unequal at baseline     HPI patient is a 76-year-old male with history of hypertension, hyperlipidemia, diabetes, anticoagulated with Xarelto who presents to the emergency department brought in by ambulance after fall.  The patient explains that he was working in his shed standing on a ladder approximately 3 rungs up when he fell backwards striking his head back against a wall and then falling to the ground on his right side.  Patient notes head trauma with no LOC however was confused after fall.  Patient notes right-sided chest wall and abdominal pain, no neck pain, + midline thoracic spine pain, patient notes abrasions on right upper extremity with full range of motion.  Patient denies any chest pain, not feeling lightheaded or dizzy, no new numbness tingling or weakness.    Review of patient's allergies indicates:  No Known Allergies  Past Medical History:   Diagnosis Date    Basal cell carcinoma 02/2015    R eyelid    Borderline diabetes 2016    [facility]    Elevated blood uric acid level 2016    Fatty liver     Hypertension     diagnosed 2005    Pancreatitis 07/2015    etiology unclear     Past Surgical History:   Procedure Laterality Date    CHOLECYSTECTOMY  2012    COLONOSCOPY      x2    COLONOSCOPY N/A 7/30/2021    Procedure: COLONOSCOPY;  Surgeon: Hector Berry MD;  Location: [address]);  Service: Endoscopy;  Laterality: N/A;  fully vaccinated-see immunization record    SKIN CANCER EXCISION       Family History   Problem Relation Age of  Onset    Diabetes Mother     Heart disease Mother     Hypertension Mother     Emphysema Father     No Known Problems Sister     No Known Problems Maternal Aunt     No Known Problems Maternal Uncle     No Known Problems Paternal Aunt     No Known Problems Paternal Uncle     No Known Problems Maternal Grandmother     No Known Problems Maternal Grandfather     No Known Problems Paternal Grandmother     No Known Problems Paternal Grandfather     No Known Problems Daughter     No Known Problems Son     No Known Problems Son     No Known Problems Daughter     Blindness Neg Hx     Glaucoma Neg Hx     Macular degeneration Neg Hx     Retinal detachment Neg Hx     Amblyopia Neg Hx     Cancer Neg Hx     Cataracts Neg Hx     Strabismus Neg Hx     Stroke Neg Hx     Thyroid disease Neg Hx     Melanoma Neg Hx      Social History     Tobacco Use    Smoking status: Never Smoker    Smokeless tobacco: Never Used   Substance Use Topics    Alcohol use: Not Currently    Drug use: No     Review of Systems  10 point review of systems reviewed with patient otherwise negative.     Physical Exam     Initial Vitals [07/09/22 1008]   BP Pulse Resp Temp SpO2   (!) 140/84 (!) 56 20 98.7 °F (37.1 °C) 97 %      MAP       --         Physical Exam  Physical Exam     Nursing note and vitals reviewed.  Constitutional: Patient appears well-developed and well-nourished. No distress.   HENT:   Head: Normocephalic and atraumatic.   Eyes: Conjunctivae and EOM are normal. Pupils are equal, round, and reactive to light.   Neck: Neck supple.   Normal range of motion.  Cardiovascular: Normal rate, regular rhythm, normal heart sounds and intact distal pulses.   Pulmonary/Chest: Breath sounds normal.   Abdominal: Abdomen is soft. Patient exhibits no distension. There is no abdominal tenderness.   Musculoskeletal:      Cervical back: Normal range of motion and neck supple.   Neurological: Patient is alert and oriented to person,  place, and time. No cranial nerve deficit. Gait normal. GCS score is 15.    Skin: + abrasion along RUE just distal to R elbow, no bony TTP R olecranon. Med/lat epicondyles, no effusion, no erythema, FROM R shoulder, no TTP R distal radius/ulna   Psych: Normal mood/affect    ED Course   Procedures  Labs Reviewed   CBC W/ AUTO DIFFERENTIAL   URINALYSIS   PROTIME-INR   ISTAT CHEM8     Bedside US: eFAST negative  Labs unremarkable  CTH unremarkable   CT Cspine pending   CT CAP without evidence of acute traumatic injury.      Imaging Results    None          Medications   morphine injection 4 mg (has no administration in time range)   ondansetron injection 4 mg (has no administration in time range)       Re-evaluation at 1445 Patient with persistent pain, though improved with morphine, some nausea associated with morphine use.     At the time s/o to Dr. Lopes patient is pending pain control, ambulation challenge, CT Cspine, re-evaluation for dispo         Clinical Impression:        Fall   Abrasion  Chest wall pain   Chest wall trauma            Halina Goldsmith MD  07/09/22 5133

## 2022-07-09 NOTE — ED TRIAGE NOTES
Ezekiel Mueller, a 76 y.o. male presents to the ED via EMS from home with CC fall from 4ft ladder in shed this Am around 8am, hit a support beam then ground, ambulatory after fall, c/o mid upper back and rib pain, tender, + head injury, denies head injury, blood to left ear, possible lac, pt on blood thinners, abrasion also to right forearm, pupils and eye movement unequal at baseline.  Pt c/o pain and tenderness over ribs anterior and posterior. Abrasions noted to right elbow. Small superficial lac to right ear, dry blood present.

## 2022-07-09 NOTE — PROVIDER PROGRESS NOTES - EMERGENCY DEPT.
Encounter Date: 7/9/2022    ED Physician Progress Notes           Patient's care was signed out to me by Dr. Goldsmith at approximately 2 PM. The patient was awaiting CT c spine read. This has returned and shows no acute fracture. Patient is in stable condition for discharge. I spent time at the bedside talking him through his findings and also discussing the importance of good pulmonary toilet. He voiced understanding and is discharged with antiemetics and pain control. He is also to follow up with his PCP within the next three days if he does not show significant improvement.   DANNI Lopes MD

## 2022-07-09 NOTE — DISCHARGE INSTRUCTIONS
You may take Tylenol (acetaminophen) 1-2 tabs, every 4- hours, as needed for pain to a MAXIMUM of 3,000 mg in 24 hours.     You may take oxycodone, 1-2 tablets, every 6 hours as needed for pain. This medication can make you very sleepy. DO NOT drink alcohol, or drive while using oxycodone.     You may take Zofran, 1 tab every 4 hours, as needed for nausea.       Return to the ED immediately for any new chest pain, shortness of breath, severe abdominal pain, abdominal pain that is constant, nausea/and vomiting that does not stop on its own, severe headache, new numbness, tingling, or weakness anywhere, fever greater than 101F or any additional concerns.

## 2022-07-12 ENCOUNTER — PATIENT MESSAGE (OUTPATIENT)
Dept: ADMINISTRATIVE | Facility: HOSPITAL | Age: 77
End: 2022-07-12
Payer: MEDICARE

## 2022-07-15 ENCOUNTER — OFFICE VISIT (OUTPATIENT)
Dept: INTERNAL MEDICINE | Facility: CLINIC | Age: 77
End: 2022-07-15
Payer: MEDICARE

## 2022-07-15 ENCOUNTER — HOSPITAL ENCOUNTER (OUTPATIENT)
Dept: RADIOLOGY | Facility: HOSPITAL | Age: 77
Discharge: HOME | End: 2022-07-15
Attending: NURSE PRACTITIONER
Payer: MEDICARE

## 2022-07-15 ENCOUNTER — PATIENT MESSAGE (OUTPATIENT)
Dept: INTERNAL MEDICINE | Facility: CLINIC | Age: 77
End: 2022-07-15

## 2022-07-15 VITALS
HEART RATE: 60 BPM | HEIGHT: 73 IN | OXYGEN SATURATION: 95 % | BODY MASS INDEX: 27.64 KG/M2 | SYSTOLIC BLOOD PRESSURE: 134 MMHG | WEIGHT: 208.56 LBS | DIASTOLIC BLOOD PRESSURE: 72 MMHG

## 2022-07-15 DIAGNOSIS — Z09 FOLLOW UP: ICD-10-CM

## 2022-07-15 DIAGNOSIS — T07.XXXA ABRASIONS OF MULTIPLE SITES: ICD-10-CM

## 2022-07-15 DIAGNOSIS — M54.9 ACUTE BILATERAL BACK PAIN, UNSPECIFIED BACK LOCATION: ICD-10-CM

## 2022-07-15 DIAGNOSIS — G89.11 ACUTE PAIN DUE TO TRAUMA: ICD-10-CM
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
## 2022-07-15 PROCEDURE — 71046 XR CHEST PA AND LATERAL: ICD-10-PCS | Mod: 26,,, | Performed by: RADIOLOGY

## 2022-07-15 PROCEDURE — 3078F DIAST BP <80 MM HG: CPT | Mod: CPTII,S$GLB,, | Performed by: NURSE PRACTITIONER

## 2022-07-15 PROCEDURE — 71046 X-RAY EXAM CHEST 2 VIEWS: CPT | Mod: TC

## 2022-07-15 PROCEDURE — 1125F AMNT PAIN NOTED PAIN PRSNT: CPT | Mod: CPTII,S$GLB,, | Performed by: NURSE PRACTITIONER

## 2022-07-15 PROCEDURE — 1160F PR REVIEW ALL MEDS BY PRESCRIBER/CLIN PHARMACIST DOCUMENTED: ICD-10-PCS | Mod: CPTII,S$GLB,, | Performed by: NURSE PRACTITIONER

## 2022-07-15 PROCEDURE — 99214 OFFICE O/P EST MOD 30 MIN: CPT | Mod: S$GLB,,, | Performed by: NURSE PRACTITIONER

## 2022-07-15 PROCEDURE — 99999 PR PBB SHADOW E&M-EST. PATIENT-LVL V: CPT | Mod: PBBFAC,,, | Performed by: NURSE PRACTITIONER

## 2022-07-15 PROCEDURE — 3288F PR FALLS RISK ASSESSMENT DOCUMENTED: ICD-10-PCS | Mod: CPTII,S$GLB,, | Performed by: NURSE PRACTITIONER

## 2022-07-15 PROCEDURE — 99999 PR PBB SHADOW E&M-EST. PATIENT-LVL V: ICD-10-PCS | Mod: PBBFAC,,, | Performed by: NURSE PRACTITIONER

## 2022-07-15 PROCEDURE — 99214 PR OFFICE/OUTPT VISIT, EST, LEVL IV, 30-39 MIN: ICD-10-PCS | Mod: S$GLB,,, | Performed by: NURSE PRACTITIONER

## 2022-07-15 PROCEDURE — 1159F PR MEDICATION LIST DOCUMENTED IN MEDICAL RECORD: ICD-10-PCS | Mod: CPTII,S$GLB,, | Performed by: NURSE PRACTITIONER

## 2022-07-15 PROCEDURE — 1125F PR PAIN SEVERITY QUANTIFIED, PAIN PRESENT: ICD-10-PCS | Mod: CPTII,S$GLB,, | Performed by: NURSE PRACTITIONER

## 2022-07-15 PROCEDURE — 3078F PR MOST RECENT DIASTOLIC BLOOD PRESSURE < 80 MM HG: ICD-10-PCS | Mod: CPTII,S$GLB,, | Performed by: NURSE PRACTITIONER

## 2022-07-15 PROCEDURE — 1100F PTFALLS ASSESS-DOCD GE2>/YR: CPT | Mod: CPTII,S$GLB,, | Performed by: NURSE PRACTITIONER

## 2022-07-15 PROCEDURE — 1160F RVW MEDS BY RX/DR IN RCRD: CPT | Mod: CPTII,S$GLB,, | Performed by: NURSE PRACTITIONER

## 2022-07-15 PROCEDURE — 71046 X-RAY EXAM CHEST 2 VIEWS: CPT | Mod: 26,,, | Performed by: RADIOLOGY

## 2022-07-15 PROCEDURE — 3075F PR MOST RECENT SYSTOLIC BLOOD PRESS GE 130-139MM HG: ICD-10-PCS | Mod: CPTII,S$GLB,, | Performed by: NURSE PRACTITIONER

## 2022-07-15 PROCEDURE — 1159F MED LIST DOCD IN RCRD: CPT | Mod: CPTII,S$GLB,, | Performed by: NURSE PRACTITIONER

## 2022-07-15 PROCEDURE — 3288F FALL RISK ASSESSMENT DOCD: CPT | Mod: CPTII,S$GLB,, | Performed by: NURSE PRACTITIONER

## 2022-07-15 PROCEDURE — 3075F SYST BP GE 130 - 139MM HG: CPT | Mod: CPTII,S$GLB,, | Performed by: NURSE PRACTITIONER

## 2022-07-15 PROCEDURE — 1100F PR PT FALLS ASSESS DOC 2+ FALLS/FALL W/INJURY/YR: ICD-10-PCS | Mod: CPTII,S$GLB,, | Performed by: NURSE PRACTITIONER

## 2022-07-15 RX ORDER — GABAPENTIN 100 MG/1
100 CAPSULE ORAL EVERY 8 HOURS PRN
Qty: 20 CAPSULE | Refills: 0 | Status: SHIPPED | OUTPATIENT
Start: 2022-07-15 | End: 2023-04-21

## 2022-07-15 RX ORDER — DICLOFENAC SODIUM 10 MG/G
2 GEL TOPICAL DAILY
Qty: 50 G | Refills: 2 | Status: SHIPPED | OUTPATIENT
Start: 2022-07-15

## 2022-07-15 RX ORDER — TIZANIDINE 4 MG/1
4 TABLET ORAL EVERY 8 HOURS PRN
Qty: 30 TABLET | Refills: 0 | Status: SHIPPED | OUTPATIENT
Start: 2022-07-15 | End: 2022-07-25

## 2022-07-15 RX ORDER — MUPIROCIN 20 MG/G
OINTMENT TOPICAL 2 TIMES DAILY
Qty: 30 G | Refills: 1 | Status: SHIPPED | OUTPATIENT
Start: 2022-07-15 | End: 2023-05-26 | Stop reason: SDUPTHER

## 2022-07-15 NOTE — PROGRESS NOTES
[facility]  New Visit Progress Note   Recent ED Discharge     PRESENTING HISTORY     PCP: Ye Dudley MD  Chief Complaint/Reason for Visit:  Follow up ED Discharge   No chief complaint on file.      History of Present Illness: Mr. Ezekiel Mueller is a 76 y.o. male who was recently discharged from the ED.  _________________________________________  Today:  Seen in the ER on 7/9 post Ladder fall.   *Noted CT Head, Chest and C Spine: unremarkable.   Here with his supportive wife, Marcelle.   Very pleasant gentleman.   *Of note, reached out to his primary in regards today..  Reports that since the ER visit, the pain was so uncomfortable that 'took a friend's Oxycodone 10 mg tab to get some relief and it helped'. Wife is concerned given that he does not want to take a deep breath. Pain exacerbated with 'movement'. No cough or SOB.     Review of System:   Eyes: denies visual changes at this time denies floaters   ENT: no nasal congestion or sore throat  Respiratory: no cough or shorness of breath  Cardiovascular: no chest pain or palpitations  Gastrointestinal: no nausea or vomiting, no abdominal pain or change in bowel habits  Genitourinary: no hematuria or dysuria; denies frequency  Hematologic/Lymphatic: no easy bruising or lymphadenopathy  Musculoskeletal: no arthralgias or myalgias  Neurological: no seizures or tremors  Endocrine: no heat or cold intolerance      PAST HISTORY:     Past Medical History:   Diagnosis Date    Basal cell carcinoma 02/2015    R eyelid    Borderline diabetes 2016    [facility]    Elevated blood uric acid level 2016    Fatty liver     Hypertension     diagnosed 2005    Pancreatitis 07/2015    etiology unclear       Past Surgical History:   Procedure Laterality Date    CHOLECYSTECTOMY  2012    COLONOSCOPY      x2    COLONOSCOPY N/A 7/30/2021    Procedure: COLONOSCOPY;  Surgeon: Hector Berry MD;  Location: [facility]  (4TH FLR);  Service: Endoscopy;  Laterality: N/A;  fully vaccinated-see immunization record    SKIN CANCER EXCISION         Family History   Problem Relation Age of Onset    Diabetes Mother     Heart disease Mother     Hypertension Mother     Emphysema Father     No Known Problems Sister     No Known Problems Maternal Aunt     No Known Problems Maternal Uncle     No Known Problems Paternal Aunt     No Known Problems Paternal Uncle     No Known Problems Maternal Grandmother     No Known Problems Maternal Grandfather     No Known Problems Paternal Grandmother     No Known Problems Paternal Grandfather     No Known Problems Daughter     No Known Problems Son     No Known Problems Son     No Known Problems Daughter     Blindness Neg Hx     Glaucoma Neg Hx     Macular degeneration Neg Hx     Retinal detachment Neg Hx     Amblyopia Neg Hx     Cancer Neg Hx     Cataracts Neg Hx     Strabismus Neg Hx     Stroke Neg Hx     Thyroid disease Neg Hx     Melanoma Neg Hx        Social History     Socioeconomic History    Marital status:    Occupational History    Occupation:  ([employer])   Tobacco Use    Smoking status: Never Smoker    Smokeless tobacco: Never Used   Substance and Sexual Activity    Alcohol use: Not Currently    Drug use: No    Sexual activity: Yes     Partners: Female     Comment:    Social History Narrative    [place] - has followed at [facility] in the past.        Exericising 4-5 times per week lifting weights and walking his dog.    Avoids fried foods but not consistently healthy / low carb diet     Social Determinants of Health     Financial Resource Strain: Low Risk     Difficulty of Paying Living Expenses: Not hard at all   Food Insecurity: No Food Insecurity    Worried About Running Out of Food in the Last Year: Never true    Ran Out of Food in the Last Year: Never true   Transportation Needs: No Transportation Needs    Lack  of Transportation (Medical): No    Lack of Transportation (Non-Medical): No   Physical Activity: Sufficiently Active    Days of Exercise per Week: 3 days    Minutes of Exercise per Session: 90 min   Stress: No Stress Concern Present    Feeling of Stress : Not at all   Social Connections: Moderately Integrated    Frequency of Communication with Friends and Family: More than three times a week    Frequency of Social Gatherings with Friends and Family: More than three times a week    Attends Confucianist Services: Never    Active Member of Clubs or Organizations: Yes    Attends Club or Organization Meetings: More than 4 times per year    Marital Status:    Housing Stability: Low Risk     Unable to Pay for Housing in the Last Year: No    Number of Places Lived in the Last Year: 1    Unstable Housing in the Last Year: No       MEDICATIONS & ALLERGIES:     Current Outpatient Medications on File Prior to Visit   Medication Sig Dispense Refill    amLODIPine (NORVASC) 10 MG tablet TAKE 1 TABLET (10 MG TOTAL) BY MOUTH ONCE DAILY. FOR BLOOD PRESSURE 90 tablet 3    atorvastatin (LIPITOR) 40 MG tablet Take 1 tablet (40 mg total) by mouth once daily. 90 tablet 3    blood sugar diagnostic Strp 1 strip by Misc.(Non-Drug; Combo Route) route once daily. 100 strip 1    blood-glucose meter Misc 1 kit by Misc.(Non-Drug; Combo Route) route 3 (three) times daily. 1 each 0    cholecalciferol, vitamin D3, (VITAMIN D3) 50 mcg (2,000 unit) Cap Take 1 capsule by mouth once daily.      erythromycin (ROMYCIN) ophthalmic ointment Place into the left eye every evening. For 5 days for infection in left eye 3.5 g 0    hydroCHLOROthiazide (HYDRODIURIL) 25 MG tablet Take 1 tablet (25 mg total) by mouth once daily. 90 tablet 3    lancing device (BD LANCET DEVICE) Misc 1 Units by Misc.(Non-Drug; Combo Route) route once daily. Please dispense lancets and strips - # 100 1 each 0    losartan (COZAAR) 25 MG tablet Take 0.5 tablets  (12.5 mg total) by mouth once daily. 45 tablet 3    metFORMIN (GLUCOPHAGE) 500 MG tablet Take 1 tablet (500 mg total) by mouth 2 (two) times daily with meals. 180 tablet 3    multivitamin with minerals tablet Take 1 tablet by mouth once daily.      omega-3 fatty acids/fish oil (FISH OIL-OMEGA-3 FATTY ACIDS) 300-1,000 mg capsule Take by mouth once daily.      ondansetron (ZOFRAN) 4 MG tablet Take 1 tablet (4 mg total) by mouth every 6 (six) hours. 12 tablet 0    oxyCODONE (ROXICODONE) 5 MG immediate release tablet Take 1 tablet (5 mg total) by mouth every 6 (six) hours as needed for Pain. 5 tablet 0    rivaroxaban (XARELTO) 20 mg Tab Take 1 tablet (20 mg total) by mouth once daily. 90 tablet 3    sotaloL (BETAPACE) 80 MG tablet Take 1 tablet (80 mg total) by mouth 2 (two) times daily. First dose on Wednesday Feb 2 in AM 60 tablet 11    TRUEPLUS LANCETS 33 gauge Misc USE AS DIRECTED THREE TIMES DAILY AS NEEDED 100 each 11    vits A,C,E/lutein/minerals (HEALTHY EYES ORAL) Take by mouth once daily.       No current facility-administered medications on file prior to visit.        Review of patient's allergies indicates:  No Known Allergies    Medications Reconciliation:   I have reconciled the patient's home medications and discharge medications with the patient/family. I have updated all changes.  Refer to After-Visit Medication List.    OBJECTIVE:     Vital Signs:  There were no vitals filed for this visit.  Wt Readings from Last 3 Encounters:   05/20/22 0838 96.6 kg (213 lb)   02/09/22 1456 98.7 kg (217 lb 9.5 oz)   01/28/22 0819 98.2 kg (216 lb 7.9 oz)     There is no height or weight on file to calculate BMI.     Wt Readings from Last 3 Encounters:   07/15/22 94.6 kg (208 lb 8.9 oz)   05/20/22 96.6 kg (213 lb)   02/09/22 98.7 kg (217 lb 9.5 oz)     Temp Readings from Last 3 Encounters:   07/09/22 98 °F (36.7 °C) (Oral)   05/20/22 98.2 °F (36.8 °C) (Oral)   10/27/21 97.9 °F (36.6 °C) (Oral)     BP Readings  from Last 3 Encounters:   07/15/22 134/72   07/09/22 (!) 160/74   05/20/22 123/65     Pulse Readings from Last 3 Encounters:   07/15/22 60   07/09/22 60   05/20/22 (!) 53       Physical Exam:  General: Well developed, well nourished. No distress, but appears uncomfortable   HEENT: Head is normocephalic, atraumatic  Eyes: Clear conjunctiva.  Neck: Supple, symmetrical neck; trachea midline.  Lungs: Clear to auscultation bilaterally and normal respiratory effort.  Cardiovascular: Heart with regular rate and rhythm. No murmurs, gallops or rubs  Extremities: No LE edema. Pulses 2+ and symmetric.   Skin: Skin color, texture, turgor normal. No rashes.  Large opened abrasion to right forearm, not cellulitic or active exudate or drainage  Musculoskeletal: Normal gait.   Neurologic:  No focal numbness or weakness.   Psychiatric: Not depressed.        Laboratory  Lab Results   Component Value Date    WBC 13.68 (H) 07/09/2022    HGB 13.5 (L) 07/09/2022    HCT 40 07/09/2022    MCV 88 07/09/2022     07/09/2022     BMP  Lab Results   Component Value Date     05/20/2022    K 3.5 05/20/2022     05/20/2022    CO2 27 05/20/2022    BUN 19 05/20/2022    CREATININE 1.0 05/20/2022    CALCIUM 10.3 05/20/2022    ANIONGAP 11 05/20/2022    ESTGFRAFRICA >60.0 05/20/2022    EGFRNONAA >60.0 05/20/2022     Lab Results   Component Value Date    ALT 26 05/20/2022    AST 24 05/20/2022    ALKPHOS 45 (L) 05/20/2022    BILITOT 0.9 05/20/2022     Lab Results   Component Value Date    INR 1.3 (H) 07/09/2022    INR 1.0 06/26/2018    INR 1.0 04/16/2015     Lab Results   Component Value Date    HGBA1C 5.8 (H) 04/23/2021     No results for input(s): POCTGLUCOSE in the last 72 hours.     CT Chest Abdomen   Impression:     1. No acute solid organ injury of the chest, abdomen, or pelvis.  2. Bilateral renal cysts.  3. Please see above for several additional findings.        Electronically signed by: Giorgio Estrada MD  Date:                                             07/09/2022  Time:                                           14:17      CT Head   Impression:     No CT evidence of acute intracranial abnormality.     Generalized cerebral volume loss and chronic ischemic changes.     Remote facial injuries and operative change.  Metallic densities in the facial and scalp soft tissues.        Electronically signed by: Daniel Ritchie MD  Date:                                            07/09/2022  Time:                                           13:54        CT C Spine  Impression:     1. No acute solid organ injury of the chest, abdomen, or pelvis.  2. Bilateral renal cysts.  3. Please see above for several additional findings.        Electronically signed by: Giorgio Estrada MD  Date:                                            07/09/2022  Time:                                           14:17  ASSESSMENT & PLAN:     * Reviewed    Recent ER admission, post Fall:   Acute Pain      Fall, initial encounter  -     X-Ray Chest PA And Lateral; Future; Expected date: 07/15/2022  -     Ambulatory referral/consult to Pain Clinic; Future; Expected date: 07/22/2022    Acute pain due to trauma  -     X-Ray Chest PA And Lateral; Future; Expected date: 07/15/2022  -     Ambulatory referral/consult to Pain Clinic; Future; Expected date: 07/22/2022    Follow up  -     X-Ray Chest PA And Lateral; Future; Expected date: 07/15/2022    Acute bilateral back pain, unspecified back location  -     X-Ray Chest PA And Lateral; Future; Expected date: 07/15/2022    Abrasions of multiple sites  -     X-Ray Chest PA And Lateral; Future; Expected date: 07/15/2022    Other orders  -     gabapentin (NEURONTIN) 100 MG capsule; Take 1 capsule (100 mg total) by mouth every 8 (eight) hours as needed (Pain). May take up to 2 tabs every 8 hours if needed for pain  Dispense: 20 capsule; Refill: 0  -     diclofenac sodium (VOLTAREN) 1 % Gel; Apply 2 g topically once daily.  Dispense: 50 g;  Refill: 2  -     tiZANidine (ZANAFLEX) 4 MG tablet; Take 1 tablet (4 mg total) by mouth every 8 (eight) hours as needed (Muscle Spasm).  Dispense: 30 tablet; Refill: 0  -     mupirocin (BACTROBAN) 2 % ointment; Apply topically 2 (two) times daily.  Dispense: 30 g; Refill: 1      *Recommend follow up in 1 week with me or his PCP to re-evaluate. If not improving, would consider checking MRI of spinal process / area of 'pain' (T12- L2-3) to ascertain there is no possible compression fractures that may have been potentially missed on the CT studies.     Future Appointments   Date Time Provider Department Center   7/15/2022 12:00 PM [facility] [id number] LB LIMIT [facility] XRAY [facility]   7/15/2022  3:30 PM KIRK Odom [facility]   9/26/2022 10:00 AM EKG, APPT [facility] EKG [facility]   9/26/2022 10:40 AM Fermín Trevino MD [facility]        Medication List          Accurate as of July 15, 2022 11:49 AM. If you have any questions, ask your nurse or doctor.            START taking these medications    diclofenac sodium 1 % Gel  Commonly known as: VOLTAREN  Apply 2 g topically once daily.  Started by: KIRK Blanco     gabapentin 100 MG capsule  Commonly known as: NEURONTIN  Take 1 capsule (100 mg total) by mouth every 8 (eight) hours as needed (Pain). May take up to 2 tabs every 8 hours if needed for pain  Started by: KIRK Blanco     mupirocin 2 % ointment  Commonly known as: BACTROBAN  Apply topically 2 (two) times daily.  Started by: KIRK Blanco     tiZANidine 4 MG tablet  Commonly known as: ZANAFLEX  Take 1 tablet (4 mg total) by mouth every 8 (eight) hours as needed (Muscle Spasm).  Started by: KIRK Blanco        CONTINUE taking these medications    amLODIPine 10 MG tablet  Commonly known as: NORVASC  TAKE 1 TABLET (10 MG TOTAL) BY MOUTH ONCE DAILY. FOR BLOOD PRESSURE     atorvastatin 40 MG tablet  Commonly known as: LIPITOR  Take  1 tablet (40 mg total) by mouth once daily.     blood sugar diagnostic Strp  1 strip by Misc.(Non-Drug; Combo Route) route once daily.     blood-glucose meter Misc  1 kit by Misc.(Non-Drug; Combo Route) route 3 (three) times daily.     cholecalciferol (vitamin D3) 50 mcg (2,000 unit) Cap capsule  Commonly known as: VITAMIN D3     erythromycin ophthalmic ointment  Commonly known as: ROMYCIN  Place into the left eye every evening. For 5 days for infection in left eye     fish oil-omega-3 fatty acids 300-1,000 mg capsule     HEALTHY EYES ORAL     hydroCHLOROthiazide 25 MG tablet  Commonly known as: HYDRODIURIL  Take 1 tablet (25 mg total) by mouth once daily.     lancing device Misc  Commonly known as: BD LANCET DEVICE  1 Units by Misc.(Non-Drug; Combo Route) route once daily. Please dispense lancets and strips - # 100     losartan 25 MG tablet  Commonly known as: COZAAR  Take 0.5 tablets (12.5 mg total) by mouth once daily.     metFORMIN 500 MG tablet  Commonly known as: GLUCOPHAGE  Take 1 tablet (500 mg total) by mouth 2 (two) times daily with meals.     multivitamin with minerals tablet     ondansetron 4 MG tablet  Commonly known as: ZOFRAN  Take 1 tablet (4 mg total) by mouth every 6 (six) hours.     oxyCODONE 5 MG immediate release tablet  Commonly known as: ROXICODONE  Take 1 tablet (5 mg total) by mouth every 6 (six) hours as needed for Pain.     rivaroxaban 20 mg Tab  Commonly known as: XARELTO  Take 1 tablet (20 mg total) by mouth once daily.     sotaloL 80 MG tablet  Commonly known as: BETAPACE  Take 1 tablet (80 mg total) by mouth 2 (two) times daily. First dose on Wednesday Feb 2 in AM     TRUEPLUS LANCETS 33 gauge Misc  Generic drug: lancets  USE AS DIRECTED THREE TIMES DAILY AS NEEDED           Where to Get Your Medications      These medications were sent to [facility] - [address]  [address] [address]    Phone: [phone]   · diclofenac sodium 1 %  Gel  · gabapentin 100 MG capsule  · mupirocin 2 % ointment  · tiZANidine 4 MG tablet         Signing Physician:  KIRK Blanco

## 2022-07-18 ENCOUNTER — PATIENT MESSAGE (OUTPATIENT)
Dept: INTERNAL MEDICINE | Facility: CLINIC | Age: 77
End: 2022-07-18
Payer: MEDICARE

## 2022-07-18 ENCOUNTER — TELEPHONE (OUTPATIENT)
Dept: INTERNAL MEDICINE | Facility: CLINIC | Age: 77
End: 2022-07-18
Payer: MEDICARE

## 2022-07-18 RX ORDER — DOXYCYCLINE HYCLATE 100 MG
100 TABLET ORAL EVERY 12 HOURS
Qty: 20 TABLET | Refills: 0 | Status: SHIPPED | OUTPATIENT
Start: 2022-07-18 | End: 2022-07-28

## 2022-07-18 RX ORDER — HYDROCODONE BITARTRATE AND ACETAMINOPHEN 5; 325 MG/1; MG/1
1 TABLET ORAL EVERY 6 HOURS PRN
Qty: 15 TABLET | Refills: 0 | Status: SHIPPED | OUTPATIENT
Start: 2022-07-18 | End: 2022-07-20 | Stop reason: SDUPTHER

## 2022-07-18 NOTE — TELEPHONE ENCOUNTER
Still with acute pain. D/w his primary care provider on Friday to update; touched base with patient today, still with 'pain' despite the Gabapentin and Zanaflex.     Will send in [place], as d/w Dr. Dudley. He will follow up with him for additional needed refills.     Will send in Doxycycline given his reporting the the 'abrasion to right elblow' is now 'getting red'. ? Secondary cellulitic process.     ELENITA

## 2022-07-18 NOTE — PROGRESS NOTES
INTERNAL MEDICINE CLINIC - SAME DAY APPOINTMENT  Progress Note    PRESENTING HISTORY     PCP: Ye Dudley MD    Chief Complaint/Reason for Visit:   No chief complaint on file.     History of Present Illness & ROS : Mr. Ezekiel Mueller is a 76 y.o. male.    2nd follow up with this provider post ER visit for Fall.   Here with his wife.   'Still in severe back pain and the elbow is now turning red and swelling'.     *Patient appears very uncomfortable today while sitting in exam room. Since seen, Rx'd Gabapentin,   Zanaflex, Doxycycline and Norcos.     Noted the CT; however, given the significance of the back pain that he describes as 'wraps to right side, will need to be seen in the ER for MRI at this time; ? Concern for possible underlying compression or 'spinous' fracture.     Review of Systems:  Eyes: denies visual changes at this time denies floaters   ENT: no nasal congestion or sore throat  Respiratory: no cough or shorness of breath  Cardiovascular: no chest pain or palpitations  Gastrointestinal: no nausea or vomiting, no abdominal pain or change in bowel habits  Genitourinary: no hematuria or dysuria; denies frequency  Hematologic/Lymphatic: no easy bruising or lymphadenopathy  Musculoskeletal: no arthralgias or myalgias  Neurological: no seizures or tremors  Endocrine: no heat or cold intolerance      PAST HISTORY:     Past Medical History:   Diagnosis Date    Basal cell carcinoma 02/2015    R eyelid    Borderline diabetes 2016    [facility]    Elevated blood uric acid level 2016    Fatty liver     Hypertension     diagnosed 2005    Pancreatitis 07/2015    etiology unclear       Past Surgical History:   Procedure Laterality Date    CHOLECYSTECTOMY  2012    COLONOSCOPY      x2    COLONOSCOPY N/A 7/30/2021    Procedure: COLONOSCOPY;  Surgeon: Hector Berry MD;  Location: [facility] ([address] [place] [address]);  Service: Endoscopy;  Laterality: N/A;  fully  vaccinated-see immunization record    SKIN CANCER EXCISION         Family History   Problem Relation Age of Onset    Diabetes Mother     Heart disease Mother     Hypertension Mother     Emphysema Father     No Known Problems Sister     No Known Problems Maternal Aunt     No Known Problems Maternal Uncle     No Known Problems Paternal Aunt     No Known Problems Paternal Uncle     No Known Problems Maternal Grandmother     No Known Problems Maternal Grandfather     No Known Problems Paternal Grandmother     No Known Problems Paternal Grandfather     No Known Problems Daughter     No Known Problems Son     No Known Problems Son     No Known Problems Daughter     Blindness Neg Hx     Glaucoma Neg Hx     Macular degeneration Neg Hx     Retinal detachment Neg Hx     Amblyopia Neg Hx     Cancer Neg Hx     Cataracts Neg Hx     Strabismus Neg Hx     Stroke Neg Hx     Thyroid disease Neg Hx     Melanoma Neg Hx        Social History     Socioeconomic History    Marital status:    Occupational History    Occupation:  ([employer])   Tobacco Use    Smoking status: Never Smoker    Smokeless tobacco: Never Used   Substance and Sexual Activity    Alcohol use: Not Currently    Drug use: No    Sexual activity: Yes     Partners: Female     Comment:    Social History Narrative    [place] - has followed at [facility] in the past.        Exericising 4-5 times per week lifting weights and walking his dog.    Avoids fried foods but not consistently healthy / low carb diet     Social Determinants of Health     Financial Resource Strain: Low Risk     Difficulty of Paying Living Expenses: Not hard at all   Food Insecurity: No Food Insecurity    Worried About Running Out of Food in the Last Year: Never true    Ran Out of Food in the Last Year: Never true   Transportation Needs: No Transportation Needs    Lack of Transportation (Medical): No    Lack of Transportation  (Non-Medical): No   Physical Activity: Sufficiently Active    Days of Exercise per Week: 3 days    Minutes of Exercise per Session: 90 min   Stress: No Stress Concern Present    Feeling of Stress : Not at all   Social Connections: Moderately Integrated    Frequency of Communication with Friends and Family: More than three times a week    Frequency of Social Gatherings with Friends and Family: More than three times a week    Attends Sikhism Services: Never    Active Member of Clubs or Organizations: Yes    Attends Club or Organization Meetings: More than 4 times per year    Marital Status:    Housing Stability: Low Risk     Unable to Pay for Housing in the Last Year: No    Number of Places Lived in the Last Year: 1    Unstable Housing in the Last Year: No       MEDICATIONS & ALLERGIES:     Current Outpatient Medications on File Prior to Visit   Medication Sig Dispense Refill    amLODIPine (NORVASC) 10 MG tablet TAKE 1 TABLET (10 MG TOTAL) BY MOUTH ONCE DAILY. FOR BLOOD PRESSURE 90 tablet 3    atorvastatin (LIPITOR) 40 MG tablet Take 1 tablet (40 mg total) by mouth once daily. 90 tablet 3    blood sugar diagnostic Strp 1 strip by Misc.(Non-Drug; Combo Route) route once daily. 100 strip 1    blood-glucose meter Misc 1 kit by Misc.(Non-Drug; Combo Route) route 3 (three) times daily. 1 each 0    cholecalciferol, vitamin D3, (VITAMIN D3) 50 mcg (2,000 unit) Cap Take 1 capsule by mouth once daily.      diclofenac sodium (VOLTAREN) 1 % Gel Apply 2 g topically once daily. 50 g 2    erythromycin (ROMYCIN) ophthalmic ointment Place into the left eye every evening. For 5 days for infection in left eye 3.5 g 0    gabapentin (NEURONTIN) 100 MG capsule Take 1 capsule (100 mg total) by mouth every 8 (eight) hours as needed (Pain). May take up to 2 tabs every 8 hours if needed for pain 20 capsule 0    hydroCHLOROthiazide (HYDRODIURIL) 25 MG tablet Take 1 tablet (25 mg total) by mouth once daily. 90  tablet 3    lancing device (BD LANCET DEVICE) Misc 1 Units by Misc.(Non-Drug; Combo Route) route once daily. Please dispense lancets and strips - # 100 1 each 0    losartan (COZAAR) 25 MG tablet Take 0.5 tablets (12.5 mg total) by mouth once daily. 45 tablet 3    metFORMIN (GLUCOPHAGE) 500 MG tablet Take 1 tablet (500 mg total) by mouth 2 (two) times daily with meals. 180 tablet 3    multivitamin with minerals tablet Take 1 tablet by mouth once daily.      mupirocin (BACTROBAN) 2 % ointment Apply topically 2 (two) times daily. 30 g 1    omega-3 fatty acids/fish oil (FISH OIL-OMEGA-3 FATTY ACIDS) 300-1,000 mg capsule Take by mouth once daily.      ondansetron (ZOFRAN) 4 MG tablet Take 1 tablet (4 mg total) by mouth every 6 (six) hours. 12 tablet 0    oxyCODONE (ROXICODONE) 5 MG immediate release tablet Take 1 tablet (5 mg total) by mouth every 6 (six) hours as needed for Pain. 5 tablet 0    rivaroxaban (XARELTO) 20 mg Tab Take 1 tablet (20 mg total) by mouth once daily. 90 tablet 3    sotaloL (BETAPACE) 80 MG tablet Take 1 tablet (80 mg total) by mouth 2 (two) times daily. First dose on Wednesday Feb 2 in AM 60 tablet 11    tiZANidine (ZANAFLEX) 4 MG tablet Take 1 tablet (4 mg total) by mouth every 8 (eight) hours as needed (Muscle Spasm). 30 tablet 0    TRUEPLUS LANCETS 33 gauge Misc USE AS DIRECTED THREE TIMES DAILY AS NEEDED 100 each 11    vits A,C,E/lutein/minerals (HEALTHY EYES ORAL) Take by mouth once daily.       No current facility-administered medications on file prior to visit.        Review of patient's allergies indicates:  No Known Allergies    Medications Reconciliation:   I have reconciled the patient's home medications with the patient/family. I have updated all changes.  Refer to After-Visit Medication List.    OBJECTIVE:     Vital Signs:  There were no vitals filed for this visit.  Wt Readings from Last 3 Encounters:   07/15/22 1059 94.6 kg (208 lb 8.9 oz)   05/20/22 0838 96.6 kg (213  lb)   02/09/22 1456 98.7 kg (217 lb 9.5 oz)     There is no height or weight on file to calculate BMI.   Wt Readings from Last 3 Encounters:   07/20/22 94.3 kg (207 lb 14.3 oz)   07/15/22 94.6 kg (208 lb 8.9 oz)   05/20/22 96.6 kg (213 lb)     Temp Readings from Last 3 Encounters:   07/09/22 98 °F (36.7 °C) (Oral)   05/20/22 98.2 °F (36.8 °C) (Oral)   10/27/21 97.9 °F (36.6 °C) (Oral)     BP Readings from Last 3 Encounters:   07/20/22 132/74   07/15/22 134/72   07/09/22 (!) 160/74     Pulse Readings from Last 3 Encounters:   07/20/22 (!) 58   07/15/22 60   07/09/22 60       Physical Exam:  General: Well developed, well nourished. No distress.  HEENT: Head is normocephalic, atraumatic  Eyes: Clear conjunctiva.  Neck: Supple, symmetrical neck; trachea midline.  Lungs: Clear to auscultation bilaterally and normal respiratory effort.  Cardiovascular: Heart with regular rate and rhythm. No murmurs, gallops or rubs  Extremities: No LE edema. Pulses 2+ and symmetric.   Right Olecreanon   Skin: Skin color, texture, turgor normal. No rashes.  Lymph Nodes: No cervical or supraclavicular adenopathy.  Neurologic: Normal strength and tone. No focal numbness or weakness.   Psychiatric: Not depressed.        Laboratory  Lab Results   Component Value Date    WBC 13.68 (H) 07/09/2022    HGB 13.5 (L) 07/09/2022    HCT 40 07/09/2022     07/09/2022    CHOL 206 (H) 09/20/2021    TRIG 176 (H) 09/20/2021    HDL 51 09/20/2021    ALT 26 05/20/2022    AST 24 05/20/2022     05/20/2022    K 3.5 05/20/2022     05/20/2022    CREATININE 1.0 05/20/2022    BUN 19 05/20/2022    CO2 27 05/20/2022    TSH 5.512 (H) 09/20/2021    PSA 0.80 04/23/2021    INR 1.3 (H) 07/09/2022    HGBA1C 5.8 (H) 04/23/2021       ASSESSMENT & PLAN:       Recent ER admission, post Fall:   Seen by me last Thursday on the initial post ER Visit, pending appt with his Primary.     2nd follow up with this provider post ER visit for Fall.   Here with his wife.    'Still in severe back pain and the elbow is now turning red and swelling'.     *Patient appears very uncomfortable today while sitting in exam room. Since seen, Rx'd Gabapentin,   Zanaflex, Doxycycline and Norcos.     Noted the CT; however, given the significance of the back pain that he describes as 'wraps to right side, needs to be seen in the ER for MRI at this time; ?concern for possible underlying compression or 'spinous' fracture.       Acute bilateral back pain, unspecified back location    Severe Back pain  *Failing to improve with Gabapentin, Zanaflex and Norco  *Still with considerable amount of Acute Pain; message notation made after 3 days with Kory, Zanaflex and topical Diclofenac, 'still with pain'; Rx'd short course of Norco 2 days ago  ` Referred to Pain mgt (appt 7/22), Rx'd Kory and Zanaflex, Diclofenac gel  (Not improving.... concern for possible compression or spinous process involvement that is attributing to the severity of his pain since the traumatic fall from ladder.. recommend urgent MRI of spinal process / area of 'pain' (T12- L2-3) to further evaluate his pain, that is reportedly 'getting worse and not improving' and may have been potentially missed on CT studies.       Abrasions of multiple sites from recent Fall/  Olecranon bursitis of right elbow:  *Failing to improve with oral antibiotics  *Message notation rec'd from patient and wife that was 'turning red to the site and swelling'; started on Doxycycline on 7/18 x 10 days.       Future Appointments   Date Time Provider Department Center   7/22/2022  8:30 AM Nevin Pacheco MD [facility] PAIN[facility]   9/26/2022 10:00 AM EKG, APPT [facility] EKG [facility]   9/26/2022 10:40 AM Fermín Trevino MD [facility] ARRHYTH [facility]        Medication List          Accurate as of July 20, 2022 11:03 AM. If you have any questions, ask your nurse or doctor.            CONTINUE taking these medications    amLODIPine 10 MG tablet  Commonly known as:  NORVASC  TAKE 1 TABLET (10 MG TOTAL) BY MOUTH ONCE DAILY. FOR BLOOD PRESSURE     atorvastatin 40 MG tablet  Commonly known as: LIPITOR  Take 1 tablet (40 mg total) by mouth once daily.     blood sugar diagnostic Strp  1 strip by Misc.(Non-Drug; Combo Route) route once daily.     blood-glucose meter Misc  1 kit by Misc.(Non-Drug; Combo Route) route 3 (three) times daily.     cholecalciferol (vitamin D3) 50 mcg (2,000 unit) Cap capsule  Commonly known as: VITAMIN D3     diclofenac sodium 1 % Gel  Commonly known as: VOLTAREN  Apply 2 g topically once daily.     doxycycline 100 MG tablet  Commonly known as: VIBRA-TABS  Take 1 tablet (100 mg total) by mouth every 12 (twelve) hours. for 10 days     erythromycin ophthalmic ointment  Commonly known as: ROMYCIN  Place into the left eye every evening. For 5 days for infection in left eye     fish oil-omega-3 fatty acids 300-1,000 mg capsule     gabapentin 100 MG capsule  Commonly known as: NEURONTIN  Take 1 capsule (100 mg total) by mouth every 8 (eight) hours as needed (Pain). May take up to 2 tabs every 8 hours if needed for pain     HEALTHY EYES ORAL     hydroCHLOROthiazide 25 MG tablet  Commonly known as: HYDRODIURIL  Take 1 tablet (25 mg total) by mouth once daily.     HYDROcodone-acetaminophen 5-325 mg per tablet  Commonly known as: NORCO  Take 1 tablet by mouth every 6 (six) hours as needed for Pain.     lancing device Misc  Commonly known as: BD LANCET DEVICE  1 Units by Misc.(Non-Drug; Combo Route) route once daily. Please dispense lancets and strips - # 100     losartan 25 MG tablet  Commonly known as: COZAAR  Take 0.5 tablets (12.5 mg total) by mouth once daily.     metFORMIN 500 MG tablet  Commonly known as: GLUCOPHAGE  Take 1 tablet (500 mg total) by mouth 2 (two) times daily with meals.     multivitamin with minerals tablet     mupirocin 2 % ointment  Commonly known as: BACTROBAN  Apply topically 2 (two) times daily.     ondansetron 4 MG tablet  Commonly known  as: ZOFRAN  Take 1 tablet (4 mg total) by mouth every 6 (six) hours.     rivaroxaban 20 mg Tab  Commonly known as: XARELTO  Take 1 tablet (20 mg total) by mouth once daily.     sotaloL 80 MG tablet  Commonly known as: BETAPACE  Take 1 tablet (80 mg total) by mouth 2 (two) times daily. First dose on Wednesday Feb 2 in AM     tiZANidine 4 MG tablet  Commonly known as: ZANAFLEX  Take 1 tablet (4 mg total) by mouth every 8 (eight) hours as needed (Muscle Spasm).     TRUEPLUS LANCETS 33 gauge Misc  Generic drug: lancets  USE AS DIRECTED THREE TIMES DAILY AS NEEDED            Signing Physician:  KIRK Blanco

## 2022-07-20 ENCOUNTER — HOSPITAL ENCOUNTER (EMERGENCY)
Facility: HOSPITAL | Age: 77
Discharge: HOME | End: 2022-07-20
Attending: EMERGENCY MEDICINE
Payer: MEDICARE

## 2022-07-20 ENCOUNTER — OFFICE VISIT (OUTPATIENT)
Dept: INTERNAL MEDICINE | Facility: CLINIC | Age: 77
End: 2022-07-20
Payer: MEDICARE

## 2022-07-20 VITALS
BODY MASS INDEX: 27.55 KG/M2 | HEART RATE: 58 BPM | HEIGHT: 73 IN | WEIGHT: 207.88 LBS | DIASTOLIC BLOOD PRESSURE: 74 MMHG | SYSTOLIC BLOOD PRESSURE: 132 MMHG | OXYGEN SATURATION: 95 %

## 2022-07-20 VITALS
OXYGEN SATURATION: 94 % | SYSTOLIC BLOOD PRESSURE: 162 MMHG | WEIGHT: 207 LBS | TEMPERATURE: 98 F | HEIGHT: 72 IN | BODY MASS INDEX: 28.04 KG/M2 | DIASTOLIC BLOOD PRESSURE: 76 MMHG | HEART RATE: 61 BPM | RESPIRATION RATE: 18 BRPM

## 2022-07-20 DIAGNOSIS — M70.21 OLECRANON BURSITIS OF RIGHT ELBOW: ICD-10-CM
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
## 2022-07-20 DIAGNOSIS — R52 PAIN: ICD-10-CM

## 2022-07-20 DIAGNOSIS — S22.009A CLOSED FRACTURE OF TRANSVERSE PROCESS OF THORACIC VERTEBRA, INITIAL ENCOUNTER: ICD-10-CM

## 2022-07-20 DIAGNOSIS — S22.31XA CLOSED FRACTURE OF ONE RIB OF RIGHT SIDE, INITIAL ENCOUNTER: Primary | ICD-10-CM

## 2022-07-20 DIAGNOSIS — R52 SEVERE PAIN: Primary | ICD-10-CM

## 2022-07-20 LAB
ALBUMIN SERPL BCP-MCNC: 3.6 G/DL (ref 3.5–5.2)
ALP SERPL-CCNC: 66 U/L (ref 55–135)
ALT SERPL W/O P-5'-P-CCNC: 12 U/L (ref 10–44)
ANION GAP SERPL CALC-SCNC: 12 MMOL/L (ref 8–16)
APPEARANCE FLD: NORMAL
AST SERPL-CCNC: 13 U/L (ref 10–40)
BASOPHILS # BLD AUTO: 0.06 K/UL (ref 0–0.2)
BASOPHILS NFR BLD: 0.8 % (ref 0–1.9)
BILIRUB SERPL-MCNC: 0.7 MG/DL (ref 0.1–1)
BILIRUB UR QL STRIP: NEGATIVE
BODY FLD TYPE: ABNORMAL
BODY FLD TYPE: NORMAL
BUN SERPL-MCNC: 15 MG/DL (ref 8–23)
CALCIUM SERPL-MCNC: 10.4 MG/DL (ref 8.7–10.5)
CHLORIDE SERPL-SCNC: 98 MMOL/L (ref 95–110)
CLARITY UR REFRACT.AUTO: ABNORMAL
CO2 SERPL-SCNC: 28 MMOL/L (ref 23–29)
COLOR FLD: NORMAL
COLOR UR AUTO: ABNORMAL
CREAT SERPL-MCNC: 1 MG/DL (ref 0.5–1.4)
CRP SERPL-MCNC: 62.3 MG/L (ref 0–8.2)
CRYSTALS FLD MICRO: POSITIVE
DIFFERENTIAL METHOD: ABNORMAL
EOSINOPHIL # BLD AUTO: 0.2 K/UL (ref 0–0.5)
EOSINOPHIL NFR BLD: 2 % (ref 0–8)
ERYTHROCYTE [DISTWIDTH] IN BLOOD BY AUTOMATED COUNT: 12.8 % (ref 11.5–14.5)
ERYTHROCYTE [SEDIMENTATION RATE] IN BLOOD BY PHOTOMETRIC METHOD: 50 MM/HR (ref 0–23)
EST. GFR  (AFRICAN AMERICAN): >60 ML/MIN/1.73 M^2
EST. GFR  (NON AFRICAN AMERICAN): >60 ML/MIN/1.73 M^2
GLUCOSE SERPL-MCNC: 110 MG/DL (ref 70–110)
GLUCOSE UR QL STRIP: NEGATIVE
GRAM STN SPEC: NORMAL
GRAM STN SPEC: NORMAL
HCT VFR BLD AUTO: 38.9 % (ref 40–54)
HCV AB SERPL QL IA: NEGATIVE
HGB BLD-MCNC: 12.8 G/DL (ref 14–18)
HGB UR QL STRIP: NEGATIVE
IMM GRANULOCYTES # BLD AUTO: 0.02 K/UL (ref 0–0.04)
IMM GRANULOCYTES NFR BLD AUTO: 0.3 % (ref 0–0.5)
KETONES UR QL STRIP: NEGATIVE
LEUKOCYTE ESTERASE UR QL STRIP: NEGATIVE
LYMPHOCYTES # BLD AUTO: 1.5 K/UL (ref 1–4.8)
LYMPHOCYTES NFR BLD: 18.8 % (ref 18–48)
LYMPHOCYTES NFR FLD MANUAL: 2 %
MCH RBC QN AUTO: 29 PG (ref 27–31)
MCHC RBC AUTO-ENTMCNC: 32.9 G/DL (ref 32–36)
MCV RBC AUTO: 88 FL (ref 82–98)
MONOCYTES # BLD AUTO: 0.8 K/UL (ref 0.3–1)
MONOCYTES NFR BLD: 10.2 % (ref 4–15)
MONOS+MACROS NFR FLD MANUAL: 2 %
NEUTROPHILS # BLD AUTO: 5.4 K/UL (ref 1.8–7.7)
NEUTROPHILS NFR BLD: 67.9 % (ref 38–73)
NEUTROPHILS NFR FLD MANUAL: 96 %
NITRITE UR QL STRIP: NEGATIVE
NRBC BLD-RTO: 0 /100 WBC
PH UR STRIP: 5 [PH] (ref 5–8)
PLATELET # BLD AUTO: 315 K/UL (ref 150–450)
PMV BLD AUTO: 11.1 FL (ref 9.2–12.9)
POTASSIUM SERPL-SCNC: 3.8 MMOL/L (ref 3.5–5.1)
PROT SERPL-MCNC: 8 G/DL (ref 6–8.4)
PROT UR QL STRIP: NEGATIVE
RBC # BLD AUTO: 4.42 M/UL (ref 4.6–6.2)
SODIUM SERPL-SCNC: 138 MMOL/L (ref 136–145)
SP GR UR STRIP: >1.03 (ref 1–1.03)
URN SPEC COLLECT METH UR: ABNORMAL
WBC # BLD AUTO: 7.91 K/UL (ref 3.9–12.7)
WBC # FLD: 8530 /CU MM

## 2022-07-20 PROCEDURE — 99999 PR PBB SHADOW E&M-EST. PATIENT-LVL IV: CPT | Mod: PBBFAC,,, | Performed by: NURSE PRACTITIONER

## 2022-07-20 PROCEDURE — 85652 RBC SED RATE AUTOMATED: CPT | Performed by: EMERGENCY MEDICINE

## 2022-07-20 PROCEDURE — 1125F PR PAIN SEVERITY QUANTIFIED, PAIN PRESENT: ICD-10-PCS | Mod: CPTII,S$GLB,, | Performed by: NURSE PRACTITIONER

## 2022-07-20 PROCEDURE — 87205 SMEAR GRAM STAIN: CPT | Performed by: EMERGENCY MEDICINE

## 2022-07-20 PROCEDURE — 20605 PR DRAIN/INJECT INTERMEDIATE JOINT/BURSA: ICD-10-PCS | Mod: RT,,, | Performed by: EMERGENCY MEDICINE

## 2022-07-20 PROCEDURE — 3075F PR MOST RECENT SYSTOLIC BLOOD PRESS GE 130-139MM HG: ICD-10-PCS | Mod: CPTII,S$GLB,, | Performed by: NURSE PRACTITIONER

## 2022-07-20 PROCEDURE — 3288F PR FALLS RISK ASSESSMENT DOCUMENTED: ICD-10-PCS | Mod: CPTII,S$GLB,, | Performed by: NURSE PRACTITIONER

## 2022-07-20 PROCEDURE — 63600175 PHARM REV CODE 636 W HCPCS: Performed by: EMERGENCY MEDICINE

## 2022-07-20 PROCEDURE — 25000003 PHARM REV CODE 250: Performed by: PHYSICIAN ASSISTANT

## 2022-07-20 PROCEDURE — 3075F SYST BP GE 130 - 139MM HG: CPT | Mod: CPTII,S$GLB,, | Performed by: NURSE PRACTITIONER

## 2022-07-20 PROCEDURE — 99284 EMERGENCY DEPT VISIT MOD MDM: CPT | Mod: 25

## 2022-07-20 PROCEDURE — 3288F FALL RISK ASSESSMENT DOCD: CPT | Mod: CPTII,S$GLB,, | Performed by: NURSE PRACTITIONER

## 2022-07-20 PROCEDURE — 25000003 PHARM REV CODE 250: Performed by: EMERGENCY MEDICINE

## 2022-07-20 PROCEDURE — 20605 DRAIN/INJ JOINT/BURSA W/O US: CPT | Mod: RT,,, | Performed by: EMERGENCY MEDICINE

## 2022-07-20 PROCEDURE — 86140 C-REACTIVE PROTEIN: CPT | Performed by: EMERGENCY MEDICINE

## 2022-07-20 PROCEDURE — 89060 EXAM SYNOVIAL FLUID CRYSTALS: CPT | Performed by: EMERGENCY MEDICINE

## 2022-07-20 PROCEDURE — 99214 PR OFFICE/OUTPT VISIT, EST, LEVL IV, 30-39 MIN: ICD-10-PCS | Mod: S$GLB,,, | Performed by: NURSE PRACTITIONER

## 2022-07-20 PROCEDURE — 99284 PR EMERGENCY DEPT VISIT,LEVEL IV: ICD-10-PCS | Mod: 25,,, | Performed by: EMERGENCY MEDICINE

## 2022-07-20 PROCEDURE — 80053 COMPREHEN METABOLIC PANEL: CPT | Performed by: EMERGENCY MEDICINE

## 2022-07-20 PROCEDURE — 96374 THER/PROPH/DIAG INJ IV PUSH: CPT

## 2022-07-20 PROCEDURE — 99284 EMERGENCY DEPT VISIT MOD MDM: CPT | Mod: 25,,, | Performed by: EMERGENCY MEDICINE

## 2022-07-20 PROCEDURE — 81003 URINALYSIS AUTO W/O SCOPE: CPT | Performed by: PHYSICIAN ASSISTANT

## 2022-07-20 PROCEDURE — 3078F PR MOST RECENT DIASTOLIC BLOOD PRESSURE < 80 MM HG: ICD-10-PCS | Mod: CPTII,S$GLB,, | Performed by: NURSE PRACTITIONER

## 2022-07-20 PROCEDURE — 1101F PT FALLS ASSESS-DOCD LE1/YR: CPT | Mod: CPTII,S$GLB,, | Performed by: NURSE PRACTITIONER

## 2022-07-20 PROCEDURE — 86803 HEPATITIS C AB TEST: CPT | Performed by: PHYSICIAN ASSISTANT

## 2022-07-20 PROCEDURE — 1101F PR PT FALLS ASSESS DOC 0-1 FALLS W/OUT INJ PAST YR: ICD-10-PCS | Mod: CPTII,S$GLB,, | Performed by: NURSE PRACTITIONER

## 2022-07-20 PROCEDURE — 89051 BODY FLUID CELL COUNT: CPT | Performed by: EMERGENCY MEDICINE

## 2022-07-20 PROCEDURE — 85025 COMPLETE CBC W/AUTO DIFF WBC: CPT | Performed by: EMERGENCY MEDICINE

## 2022-07-20 PROCEDURE — 96376 TX/PRO/DX INJ SAME DRUG ADON: CPT

## 2022-07-20 PROCEDURE — 1159F PR MEDICATION LIST DOCUMENTED IN MEDICAL RECORD: ICD-10-PCS | Mod: CPTII,S$GLB,, | Performed by: NURSE PRACTITIONER

## 2022-07-20 PROCEDURE — 1159F MED LIST DOCD IN RCRD: CPT | Mod: CPTII,S$GLB,, | Performed by: NURSE PRACTITIONER

## 2022-07-20 PROCEDURE — 3078F DIAST BP <80 MM HG: CPT | Mod: CPTII,S$GLB,, | Performed by: NURSE PRACTITIONER

## 2022-07-20 PROCEDURE — 99999 PR PBB SHADOW E&M-EST. PATIENT-LVL IV: ICD-10-PCS | Mod: PBBFAC,,, | Performed by: NURSE PRACTITIONER

## 2022-07-20 PROCEDURE — 99214 OFFICE O/P EST MOD 30 MIN: CPT | Mod: S$GLB,,, | Performed by: NURSE PRACTITIONER

## 2022-07-20 PROCEDURE — 20605 DRAIN/INJ JOINT/BURSA W/O US: CPT | Mod: RT

## 2022-07-20 PROCEDURE — 1125F AMNT PAIN NOTED PAIN PRSNT: CPT | Mod: CPTII,S$GLB,, | Performed by: NURSE PRACTITIONER

## 2022-07-20 PROCEDURE — 87070 CULTURE OTHR SPECIMN AEROBIC: CPT | Performed by: EMERGENCY MEDICINE

## 2022-07-20 RX ORDER — LIDOCAINE HYDROCHLORIDE 10 MG/ML
2 INJECTION, SOLUTION EPIDURAL; INFILTRATION; INTRACAUDAL; PERINEURAL
Status: COMPLETED | OUTPATIENT
Start: 2022-07-20 | End: 2022-07-20

## 2022-07-20 RX ORDER — ONDANSETRON 4 MG/1
4 TABLET, ORALLY DISINTEGRATING ORAL
Status: COMPLETED | OUTPATIENT
Start: 2022-07-20 | End: 2022-07-20

## 2022-07-20 RX ORDER — HYDROMORPHONE HYDROCHLORIDE 1 MG/ML
0.25 INJECTION, SOLUTION INTRAMUSCULAR; INTRAVENOUS; SUBCUTANEOUS
Status: DISCONTINUED | OUTPATIENT
Start: 2022-07-20 | End: 2022-07-20

## 2022-07-20 RX ORDER — MORPHINE SULFATE 4 MG/ML
4 INJECTION, SOLUTION INTRAMUSCULAR; INTRAVENOUS
Status: COMPLETED | OUTPATIENT
Start: 2022-07-20 | End: 2022-07-20

## 2022-07-20 RX ORDER — OXYCODONE AND ACETAMINOPHEN 5; 325 MG/1; MG/1
1 TABLET ORAL EVERY 4 HOURS PRN
Qty: 12 TABLET | Refills: 0 | Status: SHIPPED | OUTPATIENT
Start: 2022-07-20 | End: 2023-04-21 | Stop reason: ALTCHOICE

## 2022-07-20 RX ORDER — NAPROXEN 375 MG/1
375 TABLET ORAL 2 TIMES DAILY WITH MEALS
Qty: 60 TABLET | Refills: 0 | Status: SHIPPED | OUTPATIENT
Start: 2022-07-20

## 2022-07-20 RX ADMIN — MORPHINE SULFATE 4 MG: 4 INJECTION INTRAVENOUS at 02:07

## 2022-07-20 RX ADMIN — LIDOCAINE HYDROCHLORIDE 20 MG: 10 INJECTION, SOLUTION EPIDURAL; INFILTRATION; INTRACAUDAL at 01:07

## 2022-07-20 RX ADMIN — ONDANSETRON 4 MG: 4 TABLET, ORALLY DISINTEGRATING ORAL at 12:07

## 2022-07-20 RX ADMIN — MORPHINE SULFATE 4 MG: 4 INJECTION INTRAVENOUS at 12:07

## 2022-07-20 NOTE — ED TRIAGE NOTES
t with a fall from a ladder on 7/9/22.  Pt was seen in ED at that time, but pain to back worsens.  Pt also with swelling to right elbow.  Pt with right flank pain that radiates to abd region.  Pt reports continues with pain with deep breath.      LOC: The patient is awake, alert and aware of environment with an appropriate affect, the patient is oriented x 3 and speaking appropriately.  APPEARANCE: Patient resting comfortably and in no acute distress, patient is clean and well groomed, patient's clothing is properly fastened.  SKIN: The skin is warm and dry, color consistent with ethnicity, patient has normal skin turgor and moist mucus membranes, skin intact, no breakdown or bruising noted.  MUSCULOSKELETAL: Patient moving all extremities spontaneously, + swelling to right elbow noted.  + tenderness to right lower back  RESPIRATORY: Airway is open and patent, respirations are spontaneous, patient has a normal effort and rate, no accessory muscle use noted.  ABDOMEN: Soft and non tender to palpation, no distention noted.

## 2022-07-20 NOTE — DISCHARGE INSTRUCTIONS
You also have bursitis which you may take your anti-inflammatory medications for.  Wear your brace as instructed, follow-up [facility] in 2 weeks.   They will call. youe for an appointment.    Our goal in the emergency department is to always give you outstanding care and exceptional service. You may receive a survey by mail or e-mail in the next week regarding your experience in our ED. We would greatly appreciate your completing and returning the survey. Your feedback provides us with a way to recognize our staff who give very good care and it helps us learn how to improve when your experience was below our aspiration of excellence.

## 2022-07-20 NOTE — ED NOTES
Consent signed/ witnessed and time out performed for aspiration of rt elbow . -Dr MARK Nova at bedside.

## 2022-07-20 NOTE — ED PROVIDER NOTES
Encounter Date: 7/20/2022    SCRIBE #1 NOTE: I, Anai Elmore, am scribing for, and in the presence of,  Van Nova Jr., MD. I have scribed the entire note.       History     Chief Complaint   Patient presents with    Back Pain    Nausea     Pt to ER c/o nausea and worsening right lower back pain. Pt was seen on 7/9 for fall from ladder. He took norco 1 hour ago. Did not fill rx for nausea med.      Time patient was seen by the provider: 12:07 PM      The patient is a 76 y.o. male with co-morbidities including: pancreatitis, fatty liver, borderline diabetes, HTN who presents to the ED with a complaint of right-sided back pain with onset 1.5 weeks ago. The pain radiates to his abdomen. He fell off a 4 ft ladder from [employer] up onto cement on 7/9/22. He went to the ED and was told he pulled a muscle but was not sent home with a muscle relaxer. He had a CT scan that was normal. He had a laceration to his right elbow with mild joint swelling. Within the last week the elbow swelling has worsened a lot. He has had that elbow drained before many years ago. His back pain has also been worsening. It is currently 10/10. He went to his PCP last week and was prescribed Norco for his back pain and antibiotics for the elbow but is still having pain. He went to his PCP again today who told him to come here. He has had some hand numbness and some nausea earlier today secondary to the pain. He has also had difficulty walking far secondary to the pain. He denies any numbness or tingling to his legs, vomiting, fevers, chills, or night sweats. He had his pain medications and antibiotics at 1000 this morning.    The history is provided by the patient, medical records and the spouse. No  was used.     Review of patient's allergies indicates:  No Known Allergies  Past Medical History:   Diagnosis Date    Basal cell carcinoma 02/2015    R eyelid    Borderline diabetes 2016    [facility]ps     [facility]    Elevated blood uric acid level 2016    Fatty liver     Hypertension     diagnosed 2005    Pancreatitis 07/2015    etiology unclear     Past Surgical History:   Procedure Laterality Date    CHOLECYSTECTOMY  2012    COLONOSCOPY      x2    COLONOSCOPY N/A 7/30/2021    Procedure: COLONOSCOPY;  Surgeon: Hector Berry MD;  Location: [address]);  Service: Endoscopy;  Laterality: N/A;  fully vaccinated-see immunization record    SKIN CANCER EXCISION       Family History   Problem Relation Age of Onset    Diabetes Mother     Heart disease Mother     Hypertension Mother     Emphysema Father     No Known Problems Sister     No Known Problems Maternal Aunt     No Known Problems Maternal Uncle     No Known Problems Paternal Aunt     No Known Problems Paternal Uncle     No Known Problems Maternal Grandmother     No Known Problems Maternal Grandfather     No Known Problems Paternal Grandmother     No Known Problems Paternal Grandfather     No Known Problems Daughter     No Known Problems Son     No Known Problems Son     No Known Problems Daughter     Blindness Neg Hx     Glaucoma Neg Hx     Macular degeneration Neg Hx     Retinal detachment Neg Hx     Amblyopia Neg Hx     Cancer Neg Hx     Cataracts Neg Hx     Strabismus Neg Hx     Stroke Neg Hx     Thyroid disease Neg Hx     Melanoma Neg Hx      Social History     Tobacco Use    Smoking status: Never Smoker    Smokeless tobacco: Never Used   Substance Use Topics    Alcohol use: Not Currently    Drug use: No     Review of Systems   Constitutional: Negative for chills, diaphoresis and fever.   HENT: Negative for sore throat.    Respiratory: Negative for shortness of breath.    Cardiovascular: Negative for chest pain.   Gastrointestinal: Positive for abdominal pain (right) and nausea. Negative for vomiting.   Genitourinary: Negative for dysuria.   Musculoskeletal: Positive for back pain  (right) and joint swelling (right elbow).   Skin: Positive for wound (laceration to right elbow). Negative for rash.   Neurological: Positive for numbness (bilateral hands). Negative for weakness.   Hematological: Does not bruise/bleed easily.       Physical Exam     Initial Vitals [22 1111]   BP Pulse Resp Temp SpO2   137/65 (!) 56 16 97.9 °F (36.6 °C) 96 %      MAP       --         Physical Exam    Nursing note and vitals reviewed.  Constitutional: He appears well-developed and well-nourished.   HENT:   Head: Normocephalic and atraumatic.   Eyes: Conjunctivae, EOM and lids are normal. Pupils are equal, round, and reactive to light.   Neck: Trachea normal.   Normal range of motion.   Full passive range of motion without pain.     Musculoskeletal:      Cervical back: Full passive range of motion without pain and normal range of motion.      Comments: Thoracic midline spinal tenderness to palpation with no overlying skin changes. No further midline spinal point tenderness to palpation. Lower extremity strength and sensation is grossly intact.    Noted significant swelling over right elbow. Patient's ROM is intact with minimal pain. Strength is 5/5. Neurovascularly intact in his extremities.      Neurological: He is alert.   Skin: Skin is intact.   Psychiatric: He has a normal mood and affect. His speech is normal and behavior is normal. Judgment and thought content normal.         ED Course   Olecranon Bursa Aspiration     Date/Time: 2022 3:22 PM  Location procedure was performed: [facility] EMERGENCY DEPARTMENT  Performed by: Van Nova Jr., MD  Authorized by: Van Nova Jr., MD   Consent Done: Yes  Consent: Verbal consent obtained. Written consent obtained.  Risks and benefits: risks, benefits and alternatives were discussed  Consent given by: patient  Required items: required blood products, implants, devices, and special equipment available  Patient identity confirmed: DARCI, JERRICA, name and  "verbally with patient  Time out: Immediately prior to procedure a "time out" was called to verify the correct patient, procedure, equipment, support staff and site/side marked as required.  Indications: pain and diagnostic evaluation   Body area: elbow  Joint: right elbow  Local anesthesia used: yes  Anesthesia: local infiltration    Anesthesia:  Local anesthesia used: yes  Local Anesthetic: lidocaine 1% without epinephrine  Preparation: Patient was prepped and draped in the usual sterile fashion.  Needle size: 18 G  Approach: anterior  Aspirate amount: 10 mL  Aspirate: yellow,  clear and blood-tinged  Complications: No  Specimens: Yes        Labs Reviewed   URINALYSIS, REFLEX TO URINE CULTURE - Abnormal; Notable for the following components:       Result Value    Appearance, UA Hazy (*)     Specific Gravity, UA >1.030 (*)     All other components within normal limits    Narrative:     Specimen Source->Urine   CBC W/ AUTO DIFFERENTIAL - Abnormal; Notable for the following components:    RBC 4.42 (*)     Hemoglobin 12.8 (*)     Hematocrit 38.9 (*)     All other components within normal limits   SEDIMENTATION RATE - Abnormal; Notable for the following components:    Sed Rate 50 (*)     All other components within normal limits   C-REACTIVE PROTEIN - Abnormal; Notable for the following components:    CRP 62.3 (*)     All other components within normal limits   BODY FLUID CRYSTAL - Abnormal; Notable for the following components:    Body Fluid Crystal Positive (*)     All other components within normal limits   GRAM STAIN   CULTURE, BODY FLUID - BACTEC   HEPATITIS C ANTIBODY    Narrative:     Release to patient->Immediate   COMPREHENSIVE METABOLIC PANEL   WBC & DIFF, BODY FLUID   PATHOLOGIST REVIEW, BODY FLUID          Imaging Results          X-Ray Thoracolumbar Spine AP Lateral (Final result)  Result time 07/20/22 18:52:45    Final result by Giorgio Estrada MD (07/20/22 18:52:45)                 Impression:      As " above.    Electronically signed by resident: Belinda Garcias  Date:    07/20/2022  Time:    18:36    Electronically signed by: Giorgio Estrada MD  Date:    07/20/2022  Time:    18:52             Narrative:    EXAMINATION:  XR THORACOLUMBAR SPINE AP LATERAL    CLINICAL HISTORY:  Pain, unspecified    TECHNIQUE:  AP and lateral views of the thoracolumbar spine were performed.    COMPARISON:  CT thoracic spine 07/20/2022.    FINDINGS:  Brace obscuring view of thoracic spine on AP projection    Alignment: There is dextroscoliotic curvature of the midthoracic spine.    Vertebrae: Vertebral body heights are maintained.  No suspicious lytic or sclerotic lesions.  Please see previous CT for description of transverse process fractures at T7 and T8.    Discs: Multilevel disc height loss, most pronounced in the upper thoracic spine.    Degenerative changes: Multiple anterior bridging osteophytes involving the upper thoracic spine.    Soft tissues: Vascular calcifications.    Known right rib fractures are better evaluated on previous exam.                               CT Thoracic Spine Without Contrast (Final result)  Result time 07/20/22 15:58:51    Final result by Caesar Chavez MD (07/20/22 15:58:51)                 Impression:      Minimally displaced fractures involving the right 5th and 6th ribs at the costovertebral junction and right transverse process at T7 and T8, as above.  No vertebral body compression fractures identified.    Electronically signed by resident: Marlon Reynolds  Date:    07/20/2022  Time:    15:14    Electronically signed by: Caesar Chavez MD  Date:    07/20/2022  Time:    15:58             Narrative:    EXAMINATION:  CT THORACIC SPINE WITHOUT CONTRAST    CLINICAL HISTORY:  Mid-back pain, compression fracture suspected;    TECHNIQUE:  Low-dose axial, sagittal and coronal reformations are obtained through the thoracic spine.  Contrast was not administered.    COMPARISON:  CT chest abdomen pelvis 07/09/2022,  05/20/2022    FINDINGS:  Alignment: Normal.    Vertebrae/Ribs: Acute fracture of the right posterior 5th rib near the costovertebral junction with minimal displacement of small fracture fragment (axial series 4, image 173).  Additional nondisplaced acute fracture of the right posterior 6th rib near the costovertebral junction (axial series 4, image 198).  Acute minimally displaced fractures of the right transverse processes of T7 (axial series 4, image 238) and T8 (axial series 4, image 283).  Anterior bridging osteophytes involving multiple thoracic vertebral bodies suggestive of DISH.  No vertebral body height loss or osseous retropulsion.  No suspicious osseous lesions.    Discs: Normal height.    Degenerative findings: Mild multilevel degenerative change of the thoracic spine without significant neural foraminal narrowing or spinal canal stenosis at any level.    Paraspinal muscles & soft tissues: Unremarkable.    Additional findings: Respiratory motion artifact limits assessment of the pulmonary parenchyma.  No large consolidation.  No pleural fluid or pneumothorax.  Visualized thyroid gland unremarkable.  Aortic and coronary artery calcific atherosclerosis.  Punctate nonobstructing right renal stone versus vascular calcification.  Bilateral renal cysts.                               X-Ray Elbow Complete Right (Final result)  Result time 07/20/22 13:49:59    Final result by Jarred Alan MD (07/20/22 13:49:59)                 Impression:      See above      Electronically signed by: Jarred Alan MD  Date:    07/20/2022  Time:    13:49             Narrative:    EXAMINATION:  XR ELBOW COMPLETE 3 VIEW RIGHT    CLINICAL HISTORY:  . Unspecified fall, initial encounter    TECHNIQUE:  AP, lateral, and oblique views of the right elbow were performed.    COMPARISON:  None    FINDINGS:  Mild DJD.  No fracture or bone destruction.  There is a small osteophyte noted arising dorsal aspect olecranon process.  There is a  soft tissue swelling noted posteriorly which related to olecranon bursitis                                 Medications   ondansetron disintegrating tablet 4 mg (4 mg Oral Given 7/20/22 1235)   morphine injection 4 mg (4 mg Intravenous Given 7/20/22 1236)   LIDOcaine (PF) 10 mg/ml (1%) injection 20 mg (20 mg Injection Given 7/20/22 1316)   morphine injection 4 mg (4 mg Intravenous Given 7/20/22 1426)     Medical Decision Making:   History:   Old Medical Records: I decided to obtain old medical records.  Old Records Summarized: records from clinic visits.       <> Summary of Records: Patient was started on doxycyline yesterday by PCP.  Initial Assessment:   76 y.o. male with history of pancreatitis, fatty liver, borderline diabetes, HTN presenting with back pain and right elbow swelling after a fall 1.5 weeks ago.  Abnormal vital signs: Pulse 56  PE noted for: Thoracic midline spinal tenderness to palpation. Noted significant swelling over right elbow.   Differential Diagnosis:   DDx includes but is not limited to:  Muscular strain, muscular sprain, fracture, olecranon bursitis.  Clinical Tests:   Lab Tests: Ordered and Reviewed  Radiological Study: Ordered and Reviewed  ED Management:  Plan: lab testing, x-ray, analgesia, then reassess.  Other:   I have discussed this case with another health care provider.       <> Summary of the Discussion: 4:28 PM Orthopedics  PT signed out to Dr. Le pending ortho consultation.         Scribe Attestation:   Scribe #1: I performed the above scribed service and the documentation accurately describes the services I performed. I attest to the accuracy of the note.        ED Course as of 07/21/22 1619   Wed Jul 20, 2022   1625 CT noted for Minimally displaced fractures involving the right 5th and 6th ribs at the costovertebral junction and right transverse process at T7 and T8,  [DC]   [id number]   BRACE [facility] [place]  [DC]   1630 Spoke with DME will arrange for TS brace  [DC]      ED  Course User Index  [DC] Van Nova Jr., MD             Clinical Impression:   Final diagnoses:  [W19.XXXA] Fall  [R52] Pain  [S22.31XA] Closed fracture of one rib of right side, initial encounter (Primary)  [S22.009A] Closed fracture of transverse process of thoracic vertebra, initial encounter  [M70.21] Olecranon bursitis of right elbow          ED Disposition Condition    Discharge Stable        ED Prescriptions     Medication Sig Dispense Start Date End Date Auth. Provider    oxyCODONE-acetaminophen (PERCOCET) 5-325 mg per tablet Take 1 tablet by mouth every 4 (four) hours as needed for Pain. 12 tablet 7/20/2022  Van Nova Jr., MD    naproxen (NAPROSYN) 375 MG tablet Take 1 tablet (375 mg total) by mouth 2 (two) times daily with meals. 60 tablet 7/20/2022  Van Nova Jr., MD        Follow-up Information     Follow up With Specialties Details Why Contact Info Additional Information    Ye Dudley MD Internal Medicine In 1 week  [address]  [place] [address]  [phone]       [facility] - Emergency Dept Emergency Medicine  If symptoms worsen [address] [id number] [phone]     [facility] Schedule an appointment as soon as possible for a visit in 2 weeks They will call you for an appointment [address], [address]  [facility] [address] [phone] [facility] - [facility], 4th Floor Please park in [place] and use [place] elevators           Van Nova Jr., MD  07/21/22 9410

## 2022-07-20 NOTE — FIRST PROVIDER EVALUATION
Emergency Department TeleTriage Encounter Note      CHIEF COMPLAINT    Chief Complaint   Patient presents with    Back Pain    Nausea     Pt to ER c/o nausea and worsening right lower back pain. Pt was seen on 7/9 for fall from ladder. He took norco 1 hour ago. Did not fill rx for nausea med.        VITAL SIGNS   Initial Vitals [07/20/22 1111]   BP Pulse Resp Temp SpO2   137/65 (!) 56 16 97.9 °F (36.6 °C) 96 %      MAP       --            ALLERGIES    Review of patient's allergies indicates:  No Known Allergies    PROVIDER TRIAGE NOTE  This is a teletriage evaluation of a 76 y.o. male presenting to the ED with c/o worsening R sided low back pain from fall last week (no fx on CT). Seen at PCP office directed to ED.  Also sustained elbow trauma during fall with wrosening pain, swelling, redness to bursa. No fever. Received doxy and norco PTA.     PE: R olecranon bursitis. Non-toxic/well-appearing. No respiratory distress, speaks in full sentences without issue. No active emesis nor cough. Normal eye contact and mentation.     Plan: imaging, meds. Further/augmented workup at discretion of examining provider.     All ED beds are full at present; patient notified of this status.  Patient seen and medically screened by ROMA via teletriage. Orders initiated at triage to expedite care.  Patient is stable and will be placed in an ED bed when available.  Care will be transferred to an alternate provider when patient has been placed in an Exam Room further exam, additional orders, and disposition.         ORDERS  Labs Reviewed - No data to display    ED Orders (720h ago, onward)    Start Ordered     Status Ordering Provider    07/20/22 1130 07/20/22 1124  ondansetron disintegrating tablet 4 mg  ED 1 Time         Ordered ASHLEY HUITRON    07/20/22 1130 07/20/22 1124  HYDROmorphone injection 0.25 mg  ED 1 Time         Ordered ASHLEY HUITRON    07/20/22 1122 07/20/22 1124  Urinalysis, Reflex to Urine Culture Urine, Clean  Catch  STAT         Ordered ASHLEY HUITRON    07/20/22 1121 07/20/22 1124  X-Ray Elbow Complete Right  1 time imaging         Ordered ASHLEY HUITRON            Virtual Visit Note: The provider triage portion of this emergency department evaluation and documentation was performed via [employer], a HIPAA-compliant telemedicine application, in concert with a tele-presenter in the room. A face to face patient evaluation with one of my colleagues will occur once the patient is placed in an emergency department room.      DISCLAIMER: This note was prepared with [employer] voice recognition transcription software. Garbled syntax, mangled pronouns, and other bizarre constructions may be attributed to that software system.

## 2022-07-20 NOTE — PROVIDER PROGRESS NOTES - EMERGENCY DEPT.
Encounter Date: 7/20/2022    ED Physician Progress Notes             ED Physician Hand-off Note:    ED Course: I assumed care of patient from off-going ED physician, Dr. Nova.  Briefly, Patient is a 76-year-old male with history of pancreatitis, fatty liver, diabetes, hypertension presenting with right-sided back pain with onset 1.5 weeks ago.  Workup today reveals a transverse fracture of T7, T6 posterior rib fracture.  Awaiting evaluation by Orthopedic Spine surgery.  Subsequently also has a bursitis to his right elbow, awaiting fluid evaluation for bursitis evaluation.    At the time of signout plan was pending orthopedic spine surgery, TLSO brace, bursitis fluid and cell counts.  Dispo per Orthopedic Spine surgery.    Bursitis fluid not concerning for septic arthritis, likely gout related.  Ace wrap, NSAIDs for treatment.  Discussed these findings with Orthopedic surgery they recommend Ace wrap, NSAIDs and outpatient follow-up.    Regarding his spine, outpatient follow-up in 2-3 weeks with spine surgery, continue TLSO brace.     Disposition:  Discharge    Patient comfortable with discharge. Patient counseled regarding exam, results, diagnosis, treatment, and plan.    Impression:     Final diagnoses:  [W19.XXXA] Fall  [R52] Pain  [S22.31XA] Closed fracture of one rib of right side, initial encounter (Primary)  [S22.009A] Closed fracture of transverse process of thoracic vertebra, initial encounter  [M70.21] Olecranon bursitis of right elbow        Dinesh Le DO, FAAEM  Emergency Staff Physician   Dept of Emergency Medicine   [facility]  Spectralink: [id number]        Disclaimer: This note has been generated using voice-recognition software. There may be typographical errors that have been missed during proof-reading.

## 2022-07-21 LAB — PATH INTERP FLD-IMP: NORMAL

## 2022-07-21 NOTE — CONSULTS
[facility] Emergency Dept  Orthopedics  Consult Note    Patient Name: Ezekiel Mueller  MRN: [id number]  Admission Date: 7/20/2022  Hospital Length of Stay: 0 days  Attending Provider: No att. providers found  Primary Care Provider: Ye Dudley MD    Patient information was obtained from patient, past medical records and ER records.     Consults  Subjective:     Principal Problem:Closed fracture of transverse process of thoracic vertebra    Chief Complaint:   Chief Complaint   Patient presents with    Back Pain    Nausea     Pt to ER c/o nausea and worsening right lower back pain. Pt was seen on 7/9 for fall from ladder. He took norco 1 hour ago. Did not fill rx for nausea med.         HPI: Ezekiel Mueller is a 76 y.o. male with PMHx of borderline diabetes, HTN, and HLD who presents to the ED with a complaint of right-sided back pain with onset 1.5 weeks ago after falling off a 4 ft ladder on 7/9/22. He went to the ED and was told he pulled a muscle, but was not sent home with a muscle relaxer. He reports having a CT scan that was normal. He also had a laceration to his right elbow with some surrounding swelling. Within the last week the elbow swelling has worsened a lot. His back pain has also been worsening. He went to his PCP last week and was prescribed Norco for his back pain and antibiotics for the elbow, but is still having pain. He went to his PCP again today who told him to come here. He has also had difficulty walking far secondary to the pain, but has been ambulatory since the fall. He denies any weakness, numbness or tingling to his legs, vomiting, fevers, chills, or night sweats.  No bowel or bladder incontinence.  Imaging in the ED showed right-sided rib and transverse process fractures.    Orthopedics was consulted to evaluate the patient for his transverse process fractures.      Past Medical History:   Diagnosis Date    Basal cell carcinoma 02/2015    R eyelid    Borderline diabetes 2016    [facility]  [facility]ps     [facility]    Elevated blood uric acid level 2016    Fatty liver     Hypertension     diagnosed 2005    Pancreatitis 07/2015    etiology unclear       Past Surgical History:   Procedure Laterality Date    CHOLECYSTECTOMY  2012    COLONOSCOPY      x2    COLONOSCOPY N/A 7/30/2021    Procedure: COLONOSCOPY;  Surgeon: Hector Berry MD;  Location: [address]);  Service: Endoscopy;  Laterality: N/A;  fully vaccinated-see immunization record    SKIN CANCER EXCISION         Review of patient's allergies indicates:  No Known Allergies    No current facility-administered medications for this encounter.     Current Outpatient Medications   Medication Sig    amLODIPine (NORVASC) 10 MG tablet TAKE 1 TABLET (10 MG TOTAL) BY MOUTH ONCE DAILY. FOR BLOOD PRESSURE    atorvastatin (LIPITOR) 40 MG tablet Take 1 tablet (40 mg total) by mouth once daily.    blood sugar diagnostic Strp 1 strip by Misc.(Non-Drug; Combo Route) route once daily.    blood-glucose meter Misc 1 kit by Misc.(Non-Drug; Combo Route) route 3 (three) times daily.    cholecalciferol, vitamin D3, (VITAMIN D3) 50 mcg (2,000 unit) Cap Take 1 capsule by mouth once daily.    diclofenac sodium (VOLTAREN) 1 % Gel Apply 2 g topically once daily.    doxycycline (VIBRA-TABS) 100 MG tablet Take 1 tablet (100 mg total) by mouth every 12 (twelve) hours. for 10 days    erythromycin (ROMYCIN) ophthalmic ointment Place into the left eye every evening. For 5 days for infection in left eye    gabapentin (NEURONTIN) 100 MG capsule Take 1 capsule (100 mg total) by mouth every 8 (eight) hours as needed (Pain). May take up to 2 tabs every 8 hours if needed for pain    hydroCHLOROthiazide (HYDRODIURIL) 25 MG tablet Take 1 tablet (25 mg total) by mouth once daily.    lancing device (BD LANCET DEVICE) Misc 1 Units by Misc.(Non-Drug; Combo Route) route once daily. Please dispense lancets and strips - # 100     losartan (COZAAR) 25 MG tablet Take 0.5 tablets (12.5 mg total) by mouth once daily.    metFORMIN (GLUCOPHAGE) 500 MG tablet Take 1 tablet (500 mg total) by mouth 2 (two) times daily with meals.    multivitamin with minerals tablet Take 1 tablet by mouth once daily.    mupirocin (BACTROBAN) 2 % ointment Apply topically 2 (two) times daily.    naproxen (NAPROSYN) 375 MG tablet Take 1 tablet (375 mg total) by mouth 2 (two) times daily with meals.    omega-3 fatty acids/fish oil (FISH OIL-OMEGA-3 FATTY ACIDS) 300-1,000 mg capsule Take by mouth once daily.    ondansetron (ZOFRAN) 4 MG tablet Take 1 tablet (4 mg total) by mouth every 6 (six) hours.    oxyCODONE-acetaminophen (PERCOCET) 5-325 mg per tablet Take 1 tablet by mouth every 4 (four) hours as needed for Pain.    rivaroxaban (XARELTO) 20 mg Tab Take 1 tablet (20 mg total) by mouth once daily.    sotaloL (BETAPACE) 80 MG tablet Take 1 tablet (80 mg total) by mouth 2 (two) times daily. First dose on Wednesday Feb 2 in AM    tiZANidine (ZANAFLEX) 4 MG tablet Take 1 tablet (4 mg total) by mouth every 8 (eight) hours as needed (Muscle Spasm).    TRUEPLUS LANCETS 33 gauge Misc USE AS DIRECTED THREE TIMES DAILY AS NEEDED    vits A,C,E/lutein/minerals (HEALTHY EYES ORAL) Take by mouth once daily.     Family History       Problem Relation (Age of Onset)    Diabetes Mother    Emphysema Father    Heart disease Mother    Hypertension Mother    No Known Problems Sister, Maternal Aunt, Maternal Uncle, Paternal Aunt, Paternal Uncle, Maternal Grandmother, Maternal Grandfather, Paternal Grandmother, Paternal Grandfather, Daughter, Son, Son, Daughter          Tobacco Use    Smoking status: Never Smoker    Smokeless tobacco: Never Used   Substance and Sexual Activity    Alcohol use: Not Currently    Drug use: No    Sexual activity: Yes     Partners: Female     Comment:      ROS  Constitutional: Denies fever/chills   Neurological: Denies numbness/tingling  (any exceptions noted in orthopaedic exam)    Psychiatric/Behavioral: Denies change in normal mood   Eyes: Denies change in vision   Cardiovascular: Denies chest pain   Respiratory: Denies shortness of breath   Hematologic/Lymphatic: Denies easy bleeding/bruising    Skin: Denies new rash or skin lesions    Gastrointestinal: Denies nausea/vomitting/diarrhea, change in bowel habits, abdominal pain    Allergic/Immunologic: Denies adverse reactions to current medications   Musculoskeletal: see HPI     Objective:     Vital Signs (Most Recent):  Temp: 98.2 °F (36.8 °C) (07/20/22 2018)  Pulse: 61 (07/20/22 2018)  Resp: 18 (07/20/22 2018)  BP: (!) 162/76 (07/20/22 2018)  SpO2: (!) 94 % (07/20/22 2018)   Vital Signs (24h Range):  Temp:  [97.9 °F (36.6 °C)-98.2 °F (36.8 °C)] 98.2 °F (36.8 °C)  Pulse:  [53-61] 61  Resp:  [16-18] 18  SpO2:  [94 %-97 %] 94 %  BP: (124-162)/(64-76) 162/76     Weight: 93.9 kg (207 lb)  Height: 6' (182.9 cm)  Body mass index is 28.07 kg/m².    No intake or output data in the 24 hours ending 07/20/22 2322    Ortho/SPM Exam  General: no acute distress, appears stated age     Neuro: alert and oriented x3   Psych: normal mood   Head: normocephalic, atraumatic.    Eyes: no scleral icterus   Mouth: moist mucous membranes   Cardiovascular: extremities warm and well perfused   Lungs: breathing comfortably, equal chest rise bilat   Skin: clean, dry, intact (any exceptions noted in below musculoskeletal exam)    Musculoskeletal:  LUE:  - Skin intact throughout, no open wounds  - No swelling  - No ecchymosis, erythema, or signs of cellulitis  - NonTTP throughout  - AROM and PROM of the shoulder, elbow, wrist, and hand intact without pain  - Axillary/AIN/PIN/Radial/Median/Ulnar nerves assessed in isolation and are without deficit  - SILT throughout  - Compartments soft  - 2+ radial artery pulse  - Capillary Refill < 2 sec    RUE:  - Scab overlying lateral elbow  - Moderate edema over olecranon bursa with some  mild surrounding erythema  - NonTTP throughout  - AROM and PROM of the shoulder, elbow, wrist, and hand intact without pain; no elbow pain with pronation and supination of the wrist  - Axillary/AIN/PIN/Radial/Median/Ulnar nerves assessed in isolation and are without deficit  - SILT throughout  - Compartments soft  - 2+ radial artery pulse  - Capillary Refill < 2 sec    LLE:  - Skin intact throughout, no open wounds  - No swelling  - No ecchymosis, erythema, or signs of cellulitis  - NonTTP throughout  - AROM and PROM of the hip, knee, ankle, and foot intact without pain  - TA/EHL/Gastroc/FHL assessed in isolation and are without deficit  - SILT throughout  - Compartments soft  - 2+ DP and PT pulses  - Capillary Refill < 2 sec  - Negative Log roll    RLE:  - Skin intact throughout, no open wounds  - No swelling  - No ecchymosis, erythema, or signs of cellulitis  - NonTTP throughout  - AROM and PROM of the hip, knee, ankle, and foot intact without pain  - TA/EHL/Gastroc/FHL assessed in isolation and are without deficit  - SILT throughout  - Compartments soft  - 2+ DP and PT pulses  - Capillary Refill < 2 sec  - Negative Log roll    Spine/pelvis/axial body:  Moderate TTP over the right mid-thoracic spine and right posterior chest wall  No tenderness to palpation of cervical or lumbar spine  No pain with compression of pelvis  No decubitus ulcers    Significant Labs: All pertinent labs within the past 24 hours have been reviewed.    Significant Imaging: I have reviewed and interpreted all pertinent imaging results/findings.  X-ray thoracic and lumbar spine with no spinal instability  CT thoracic spine with minimally displaced right 5th and 6th rib fractures with minimally displaced right T7 and T8 lateral transverse process fractures; no other acute osseous abnormalities    Assessment/Plan:     * Closed fracture of transverse process of thoracic vertebra  Ezekiel Mueller is a 76 y.o. male with fractures of his right 5th  and 6th ribs and right 7th and 8th lateral transverse processes.  The patient is closed and neurovascularly intact with no neurologic deficits throughout.  He also has olecranon bursitis with some gout crystals present.  Patient will not require operative intervention for these problems.    Plan:  No acute orthopedic surgical intervention  TLSO brace and multimodal drug therapy for pain control; wear as needed and tolerated  Recommend continued compressive wrap and NSAIDs for the right olecranon bursitis  [facility] (patient will be contacted with appointment details)      Wilian Griffith MD  Orthopedics  [facility]

## 2022-07-21 NOTE — SUBJECTIVE & OBJECTIVE
Past Medical History:   Diagnosis Date    Basal cell carcinoma 02/2015    R eyelid    Borderline diabetes 2016    Colon polyps     [facility], none on [facility]    Elevated blood uric acid level 2016    Fatty liver     Hypertension     diagnosed 2005    Pancreatitis 07/2015    etiology unclear       Past Surgical History:   Procedure Laterality Date    CHOLECYSTECTOMY  2012    COLONOSCOPY      x2    COLONOSCOPY N/A 7/30/2021    Procedure: COLONOSCOPY;  Surgeon: Hector Berry MD;  Location: [address]);  Service: Endoscopy;  Laterality: N/A;  fully vaccinated-see immunization record    SKIN CANCER EXCISION         Review of patient's allergies indicates:  No Known Allergies    No current facility-administered medications for this encounter.     Current Outpatient Medications   Medication Sig    amLODIPine (NORVASC) 10 MG tablet TAKE 1 TABLET (10 MG TOTAL) BY MOUTH ONCE DAILY. FOR BLOOD PRESSURE    atorvastatin (LIPITOR) 40 MG tablet Take 1 tablet (40 mg total) by mouth once daily.    blood sugar diagnostic Strp 1 strip by Misc.(Non-Drug; Combo Route) route once daily.    blood-glucose meter Misc 1 kit by Misc.(Non-Drug; Combo Route) route 3 (three) times daily.    cholecalciferol, vitamin D3, (VITAMIN D3) 50 mcg (2,000 unit) Cap Take 1 capsule by mouth once daily.    diclofenac sodium (VOLTAREN) 1 % Gel Apply 2 g topically once daily.    doxycycline (VIBRA-TABS) 100 MG tablet Take 1 tablet (100 mg total) by mouth every 12 (twelve) hours. for 10 days    erythromycin (ROMYCIN) ophthalmic ointment Place into the left eye every evening. For 5 days for infection in left eye    gabapentin (NEURONTIN) 100 MG capsule Take 1 capsule (100 mg total) by mouth every 8 (eight) hours as needed (Pain). May take up to 2 tabs every 8 hours if needed for pain    hydroCHLOROthiazide (HYDRODIURIL) 25 MG tablet Take 1 tablet (25 mg total) by mouth once daily.    lancing device (BD LANCET DEVICE) Misc 1  Units by Misc.(Non-Drug; Combo Route) route once daily. Please dispense lancets and strips - # 100    losartan (COZAAR) 25 MG tablet Take 0.5 tablets (12.5 mg total) by mouth once daily.    metFORMIN (GLUCOPHAGE) 500 MG tablet Take 1 tablet (500 mg total) by mouth 2 (two) times daily with meals.    multivitamin with minerals tablet Take 1 tablet by mouth once daily.    mupirocin (BACTROBAN) 2 % ointment Apply topically 2 (two) times daily.    naproxen (NAPROSYN) 375 MG tablet Take 1 tablet (375 mg total) by mouth 2 (two) times daily with meals.    omega-3 fatty acids/fish oil (FISH OIL-OMEGA-3 FATTY ACIDS) 300-1,000 mg capsule Take by mouth once daily.    ondansetron (ZOFRAN) 4 MG tablet Take 1 tablet (4 mg total) by mouth every 6 (six) hours.    oxyCODONE-acetaminophen (PERCOCET) 5-325 mg per tablet Take 1 tablet by mouth every 4 (four) hours as needed for Pain.    rivaroxaban (XARELTO) 20 mg Tab Take 1 tablet (20 mg total) by mouth once daily.    sotaloL (BETAPACE) 80 MG tablet Take 1 tablet (80 mg total) by mouth 2 (two) times daily. First dose on Wednesday Feb 2 in AM    tiZANidine (ZANAFLEX) 4 MG tablet Take 1 tablet (4 mg total) by mouth every 8 (eight) hours as needed (Muscle Spasm).    TRUEPLUS LANCETS 33 gauge Misc USE AS DIRECTED THREE TIMES DAILY AS NEEDED    vits A,C,E/lutein/minerals (HEALTHY EYES ORAL) Take by mouth once daily.     Family History       Problem Relation (Age of Onset)    Diabetes Mother    Emphysema Father    Heart disease Mother    Hypertension Mother    No Known Problems Sister, Maternal Aunt, Maternal Uncle, Paternal Aunt, Paternal Uncle, Maternal Grandmother, Maternal Grandfather, Paternal Grandmother, Paternal Grandfather, Daughter, Son, Son, Daughter          Tobacco Use    Smoking status: Never Smoker    Smokeless tobacco: Never Used   Substance and Sexual Activity    Alcohol use: Not Currently    Drug use: No    Sexual activity: Yes     Partners: Female     Comment:       ROS  Constitutional: Denies fever/chills   Neurological: Denies numbness/tingling (any exceptions noted in orthopaedic exam)    Psychiatric/Behavioral: Denies change in normal mood   Eyes: Denies change in vision   Cardiovascular: Denies chest pain   Respiratory: Denies shortness of breath   Hematologic/Lymphatic: Denies easy bleeding/bruising    Skin: Denies new rash or skin lesions    Gastrointestinal: Denies nausea/vomitting/diarrhea, change in bowel habits, abdominal pain    Allergic/Immunologic: Denies adverse reactions to current medications   Musculoskeletal: see HPI     Objective:     Vital Signs (Most Recent):  Temp: 98.2 °F (36.8 °C) (07/20/22 2018)  Pulse: 61 (07/20/22 2018)  Resp: 18 (07/20/22 2018)  BP: (!) 162/76 (07/20/22 2018)  SpO2: (!) 94 % (07/20/22 2018)   Vital Signs (24h Range):  Temp:  [97.9 °F (36.6 °C)-98.2 °F (36.8 °C)] 98.2 °F (36.8 °C)  Pulse:  [53-61] 61  Resp:  [16-18] 18  SpO2:  [94 %-97 %] 94 %  BP: (124-162)/(64-76) 162/76     Weight: 93.9 kg (207 lb)  Height: 6' (182.9 cm)  Body mass index is 28.07 kg/m².    No intake or output data in the 24 hours ending 07/20/22 2322    Ortho/SPM Exam  General: no acute distress, appears stated age     Neuro: alert and oriented x3   Psych: normal mood   Head: normocephalic, atraumatic.    Eyes: no scleral icterus   Mouth: moist mucous membranes   Cardiovascular: extremities warm and well perfused   Lungs: breathing comfortably, equal chest rise bilat   Skin: clean, dry, intact (any exceptions noted in below musculoskeletal exam)    Musculoskeletal:  LUE:  - Skin intact throughout, no open wounds  - No swelling  - No ecchymosis, erythema, or signs of cellulitis  - NonTTP throughout  - AROM and PROM of the shoulder, elbow, wrist, and hand intact without pain  - Axillary/AIN/PIN/Radial/Median/Ulnar nerves assessed in isolation and are without deficit  - SILT throughout  - Compartments soft  - 2+ radial artery pulse  - Capillary Refill < 2  sec    RUE:  - Scab overlying lateral elbow  - Moderate edema over olecranon bursa with some mild surrounding erythema  - NonTTP throughout  - AROM and PROM of the shoulder, elbow, wrist, and hand intact without pain; no elbow pain with pronation and supination of the wrist  - Axillary/AIN/PIN/Radial/Median/Ulnar nerves assessed in isolation and are without deficit  - SILT throughout  - Compartments soft  - 2+ radial artery pulse  - Capillary Refill < 2 sec    LLE:  - Skin intact throughout, no open wounds  - No swelling  - No ecchymosis, erythema, or signs of cellulitis  - NonTTP throughout  - AROM and PROM of the hip, knee, ankle, and foot intact without pain  - TA/EHL/Gastroc/FHL assessed in isolation and are without deficit  - SILT throughout  - Compartments soft  - 2+ DP and PT pulses  - Capillary Refill < 2 sec  - Negative Log roll    RLE:  - Skin intact throughout, no open wounds  - No swelling  - No ecchymosis, erythema, or signs of cellulitis  - NonTTP throughout  - AROM and PROM of the hip, knee, ankle, and foot intact without pain  - TA/EHL/Gastroc/FHL assessed in isolation and are without deficit  - SILT throughout  - Compartments soft  - 2+ DP and PT pulses  - Capillary Refill < 2 sec  - Negative Log roll    Spine/pelvis/axial body:  Moderate TTP over the right mid-thoracic spine and right posterior chest wall  No tenderness to palpation of cervical or lumbar spine  No pain with compression of pelvis  No decubitus ulcers    Significant Labs: All pertinent labs within the past 24 hours have been reviewed.    Significant Imaging: I have reviewed and interpreted all pertinent imaging results/findings.  X-ray thoracic and lumbar spine with no spinal instability  CT thoracic spine with minimally displaced right 5th and 6th rib fractures with minimally displaced right T7 and T8 lateral transverse process fractures; no other acute osseous abnormalities

## 2022-07-21 NOTE — HPI
Ezekiel Mueller is a 76 y.o. male with PMHx of borderline diabetes, HTN, and HLD who presents to the ED with a complaint of right-sided back pain with onset 1.5 weeks ago after falling off a 4 ft ladder on 7/9/22. He went to the ED and was told he pulled a muscle, but was not sent home with a muscle relaxer. He reports having a CT scan that was normal. He also had a laceration to his right elbow with some surrounding swelling. Within the last week the elbow swelling has worsened a lot. His back pain has also been worsening. He went to his PCP last week and was prescribed Norco for his back pain and antibiotics for the elbow, but is still having pain. He went to his PCP again today who told him to come here. He has also had difficulty walking far secondary to the pain, but has been ambulatory since the fall. He denies any weakness, numbness or tingling to his legs, vomiting, fevers, chills, or night sweats.  No bowel or bladder incontinence.  Imaging in the ED showed right-sided rib and transverse process fractures.    Orthopedics was consulted to evaluate the patient for his transverse process fractures.

## 2022-07-21 NOTE — ASSESSMENT & PLAN NOTE
Ezekiel Mueller is a 76 y.o. male with fractures of his right 5th and 6th ribs and right 7th and 8th lateral transverse processes.  The patient is closed and neurovascularly intact with no neurologic deficits throughout.  He also has olecranon bursitis with some gout crystals present.  Patient will not require operative intervention for these problems.    Plan:  No acute orthopedic surgical intervention  TLSO brace and multimodal drug therapy for pain control; wear as needed and tolerated  Recommend continued compressive wrap and NSAIDs for the right olecranon bursitis  [facility]up [facility] (patient will be contacted with appointment details)

## 2022-07-22 ENCOUNTER — TELEPHONE (OUTPATIENT)
Dept: ORTHOPEDICS | Facility: CLINIC | Age: 77
End: 2022-07-22
Payer: MEDICARE

## 2022-07-22 NOTE — TELEPHONE ENCOUNTER
Spoke with patient and wife and they agreed to appointment verbally. No xray's needed per FNP-C Boris.

## 2022-07-22 NOTE — TELEPHONE ENCOUNTER
----- Message from Wilian Griffith MD sent at 7/22/2022  2:55 PM CDT -----  Please arrange follow up for this patient in 2 weeks after an ED visit where he was diagnosed with 2 transverse process fractures. Fitted for a TLSO brace in the ED. Discussed with Dr. Kirkland. Thanks!

## 2022-07-25 LAB — BACTERIA FLD CULT: NORMAL

## 2022-07-29 DIAGNOSIS — I10 ESSENTIAL HYPERTENSION: ICD-10-CM

## 2022-07-29 RX ORDER — AMLODIPINE BESYLATE 10 MG/1
10 TABLET ORAL DAILY
Qty: 90 TABLET | Refills: 3 | Status: CANCELLED | OUTPATIENT
Start: 2022-07-29

## 2022-07-29 RX ORDER — AMLODIPINE BESYLATE 10 MG/1
10 TABLET ORAL DAILY
Qty: 90 TABLET | Refills: 3 | Status: SHIPPED | OUTPATIENT
Start: 2022-07-29 | End: 2022-10-28 | Stop reason: SDUPTHER

## 2022-08-01 NOTE — PROGRESS NOTES
DATE: 8/5/2022  PATIENT: Ezekiel Mueller    Supervising Physician: Robert Aggarwal M.D.    CHIEF COMPLAINT: back pain    HISTORY:  Ezekiel Mueller is a 76 y.o. male here for initial evaluation of low back pain (Back - 1). The pain has been present since a fall off of a ladder on 7/20/22. The patient went to the ED on 7/20/22 after a fall from a ladder for back pain and was diagnosed with a right rib fracture and T7 & T8 transverse process fractures. He was discharged stable and referred to me. The patient describes the pain as aching, which has decreased in severity since the incident.  The pain is worse with standing up and improved by rest. There is no associated numbness and tingling. There is no subjective weakness. Prior treatments have included percocet, but no PT, PHAM or surgery.     The patient denies myelopathic symptoms such as handwriting changes or difficulty with buttons/coins/keys. Denies perineal paresthesias, bowel/bladder dysfunction.    PAST MEDICAL/SURGICAL HISTORY:  Past Medical History:   Diagnosis Date    Basal cell carcinoma 02/2015    R eyelid    Borderline diabetes 2016    [facility]ps     [facility]    Elevated blood uric acid level 2016    Fatty liver     Hypertension     diagnosed 2005    Pancreatitis 07/2015    etiology unclear     Past Surgical History:   Procedure Laterality Date    CHOLECYSTECTOMY  2012    COLONOSCOPY      x2    COLONOSCOPY N/A 7/30/2021    Procedure: COLONOSCOPY;  Surgeon: Hector Berry MD;  Location: [address]);  Service: Endoscopy;  Laterality: N/A;  fully vaccinated-see immunization record    SKIN CANCER EXCISION         Medications:   Current Outpatient Medications on File Prior to Visit   Medication Sig Dispense Refill    amLODIPine (NORVASC) 10 MG tablet Take 1 tablet (10 mg total) by mouth once daily. For blood pressure 90 tablet 3    atorvastatin (LIPITOR) 40 MG tablet Take 1 tablet (40  mg total) by mouth once daily. 90 tablet 3    blood sugar diagnostic Strp 1 strip by Misc.(Non-Drug; Combo Route) route once daily. 100 strip 1    cholecalciferol, vitamin D3, (VITAMIN D3) 50 mcg (2,000 unit) Cap Take 1 capsule by mouth once daily.      diclofenac sodium (VOLTAREN) 1 % Gel Apply 2 g topically once daily. 50 g 2    erythromycin (ROMYCIN) ophthalmic ointment Place into the left eye every evening. For 5 days for infection in left eye 3.5 g 0    gabapentin (NEURONTIN) 100 MG capsule Take 1 capsule (100 mg total) by mouth every 8 (eight) hours as needed (Pain). May take up to 2 tabs every 8 hours if needed for pain 20 capsule 0    hydroCHLOROthiazide (HYDRODIURIL) 25 MG tablet Take 1 tablet (25 mg total) by mouth once daily. 90 tablet 3    losartan (COZAAR) 25 MG tablet Take 0.5 tablets (12.5 mg total) by mouth once daily. 45 tablet 3    metFORMIN (GLUCOPHAGE) 500 MG tablet Take 1 tablet (500 mg total) by mouth 2 (two) times daily with meals. 180 tablet 3    multivitamin with minerals tablet Take 1 tablet by mouth once daily.      mupirocin (BACTROBAN) 2 % ointment Apply topically 2 (two) times daily. 30 g 1    naproxen (NAPROSYN) 375 MG tablet Take 1 tablet (375 mg total) by mouth 2 (two) times daily with meals. 60 tablet 0    omega-3 fatty acids/fish oil (FISH OIL-OMEGA-3 FATTY ACIDS) 300-1,000 mg capsule Take by mouth once daily.      ondansetron (ZOFRAN) 4 MG tablet Take 1 tablet (4 mg total) by mouth every 6 (six) hours. 12 tablet 0    oxyCODONE-acetaminophen (PERCOCET) 5-325 mg per tablet Take 1 tablet by mouth every 4 (four) hours as needed for Pain. 12 tablet 0    rivaroxaban (XARELTO) 20 mg Tab Take 1 tablet (20 mg total) by mouth once daily. 90 tablet 3    sotaloL (BETAPACE) 80 MG tablet Take 1 tablet (80 mg total) by mouth 2 (two) times daily. First dose on Wednesday Feb 2 in AM 60 tablet 11    TRUEPLUS LANCETS 33 gauge Misc USE AS DIRECTED THREE TIMES DAILY AS NEEDED 100 each  11    vits A,C,E/lutein/minerals (HEALTHY EYES ORAL) Take by mouth once daily.      blood-glucose meter Misc 1 kit by Misc.(Non-Drug; Combo Route) route 3 (three) times daily. 1 each 0    lancing device (BD LANCET DEVICE) Misc 1 Units by Misc.(Non-Drug; Combo Route) route once daily. Please dispense lancets and strips - # 100 1 each 0     No current facility-administered medications on file prior to visit.       Social History:   Social History     Socioeconomic History    Marital status:    Occupational History    Occupation:  ([employer])   Tobacco Use    Smoking status: Never Smoker    Smokeless tobacco: Never Used   Substance and Sexual Activity    Alcohol use: Not Currently    Drug use: No    Sexual activity: Yes     Partners: Female     Comment:    Social History Narrative    [place] - has followed at [facility] in the past.        Exericising 4-5 times per week lifting weights and walking his dog.    Avoids fried foods but not consistently healthy / low carb diet     Social Determinants of Health     Financial Resource Strain: Low Risk     Difficulty of Paying Living Expenses: Not hard at all   Food Insecurity: No Food Insecurity    Worried About Running Out of Food in the Last Year: Never true    Ran Out of Food in the Last Year: Never true   Transportation Needs: No Transportation Needs    Lack of Transportation (Medical): No    Lack of Transportation (Non-Medical): No   Physical Activity: Sufficiently Active    Days of Exercise per Week: 3 days    Minutes of Exercise per Session: 90 min   Stress: No Stress Concern Present    Feeling of Stress : Not at all   Social Connections: Moderately Integrated    Frequency of Communication with Friends and Family: More than three times a week    Frequency of Social Gatherings with Friends and Family: More than three times a week    Attends Mu-ism Services: Never    Active Member of Clubs or Organizations:  "Yes    Attends Club or Organization Meetings: More than 4 times per year    Marital Status:    Housing Stability: Low Risk     Unable to Pay for Housing in the Last Year: No    Number of Places Lived in the Last Year: 1    Unstable Housing in the Last Year: No       REVIEW OF SYSTEMS:  Constitution: Negative. Negative for chills, fever and night sweats.   Cardiovascular: Negative for chest pain and syncope.   Respiratory: Negative for cough and shortness of breath.   Gastrointestinal: See HPI. Negative for nausea/vomiting. Negative for abdominal pain.  Genitourinary: See HPI. Negative for discoloration or dysuria.  Skin: Negative for dry skin, itching and rash.   Hematologic/Lymphatic: Negative for bleeding problem. Does not bruise/bleed easily.   Musculoskeletal: Negative for falls and muscle weakness.   Neurological: See HPI. No seizures.   Endocrine: Negative for polydipsia, polyphagia and polyuria.   Allergic/Immunologic: Negative for hives and persistent infections.     EXAM:  Ht 6' 0.5" (1.842 m)   Wt 91.6 kg (201 lb 15.1 oz)   BMI 27.01 kg/m²     General: The patient is a very pleasant 76 y.o. male in no apparent distress, the patient is oriented to person, place and time.  Psych: Normal mood and affect  HEENT: Vision grossly intact, hearing intact to the spoken word.  Lungs: Respirations unlabored.  Gait: Normal station and gait, no difficulty with toe or heel walk.   Skin: Dorsal lumbar skin negative for rashes, lesions, hairy patches and surgical scars. There is mild lumbar tenderness to palpation.  Range of motion: Lumbar range of motion is acceptable.  Spinal Balance: Global saggital and coronal spinal balance acceptable, not significant for scoliosis and kyphosis.  Musculoskeletal: No pain with the range of motion of the bilateral hips. No trochanteric tenderness to palpation.  Vascular: Bilateral lower extremities warm and well perfused, dorsalis pedis pulses 2+ " bilaterally.  Neurological: Normal strength and tone in all major motor groups in the bilateral lower extremities. Normal sensation to light touch in the L2-S1 dermatomes bilaterally.  Deep tendon reflexes symmetric 2+ in the bilateral lower extremities.  Negative Babinski bilaterally. Straight leg raise negative bilaterally.    IMAGING:      Today I personally reviewed AP, Lat and Flex/Ex  upright L-spine films that demonstrate mild DDD. CT shows T7 and T8 transverse process fractures.       Body mass index is 27.01 kg/m².    Hemoglobin A1C   Date Value Ref Range Status   04/23/2021 5.8 (H) 4.0 - 5.6 % Final     Comment:     ADA Screening Guidelines:  5.7-6.4%  Consistent with prediabetes  >or=6.5%  Consistent with diabetes    High levels of fetal hemoglobin interfere with the HbA1C  assay. Heterozygous hemoglobin variants (HbS, HgC, etc)do  not significantly interfere with this assay.   However, presence of multiple variants may affect accuracy.     03/12/2020 6.1 (H) 4.0 - 5.6 % Final     Comment:     ADA Screening Guidelines:  5.7-6.4%  Consistent with prediabetes  >or=6.5%  Consistent with diabetes  High levels of fetal hemoglobin interfere with the HbA1C  assay. Heterozygous hemoglobin variants (HbS, HgC, etc)do  not significantly interfere with this assay.   However, presence of multiple variants may affect accuracy.     05/13/2019 5.9 (H) 4.0 - 5.6 % Final     Comment:     ADA Screening Guidelines:  5.7-6.4%  Consistent with prediabetes  >or=6.5%  Consistent with diabetes  High levels of fetal hemoglobin interfere with the HbA1C  assay. Heterozygous hemoglobin variants (HbS, HgC, etc)do  not significantly interfere with this assay.   However, presence of multiple variants may affect accuracy.             ASSESSMENT/PLAN:    Ezekiel was seen today for follow-up and follow-up.    Diagnoses and all orders for this visit:    Other closed fracture of seventh thoracic vertebra with routine healing, subsequent  encounter    Other closed fracture of eighth thoracic vertebra with routine healing, subsequent encounter        Today we discussed at length all of the different treatment options including anti-inflammatories, acetaminophen, rest, ice, heat, physical therapy including strengthening and stretching exercises, home exercises, ROM, aerobic conditioning, aqua therapy, other modalities including ultrasound, massage, and dry needling, epidural steroid injections and finally surgical intervention. No surgery warranted. The patient will follow up as needed if his pain returns.     I provided the patient with a home exercise program. It is the AAOS spine conditioning program. Exercises include head rolls, kneeling back extension, sitting rotation stretch, modified seated side straddle, knee to chest, bird dog, plank, modified seated plank, hip bridges, abdominal bracing, and abdominal crunch. Pt will complete each exercise 5 times daily for 6-8 weeks.

## 2022-08-05 ENCOUNTER — OFFICE VISIT (OUTPATIENT)
Dept: ORTHOPEDICS | Facility: CLINIC | Age: 77
End: 2022-08-05
Payer: MEDICARE

## 2022-08-05 VITALS — HEIGHT: 73 IN | BODY MASS INDEX: 26.76 KG/M2 | WEIGHT: 201.94 LBS

## 2022-08-05 DIAGNOSIS — S22.068D OTHER CLOSED FRACTURE OF SEVENTH THORACIC VERTEBRA WITH ROUTINE HEALING, SUBSEQUENT ENCOUNTER: Primary | ICD-10-CM

## 2022-08-05 DIAGNOSIS — S22.068D OTHER CLOSED FRACTURE OF EIGHTH THORACIC VERTEBRA WITH ROUTINE HEALING, SUBSEQUENT ENCOUNTER: ICD-10-CM

## 2022-08-05 PROCEDURE — 1160F PR REVIEW ALL MEDS BY PRESCRIBER/CLIN PHARMACIST DOCUMENTED: ICD-10-PCS | Mod: CPTII,S$GLB,, | Performed by: REGISTERED NURSE

## 2022-08-05 PROCEDURE — 99999 PR PBB SHADOW E&M-EST. PATIENT-LVL III: CPT | Mod: PBBFAC,,, | Performed by: REGISTERED NURSE

## 2022-08-05 PROCEDURE — 99204 PR OFFICE/OUTPT VISIT, NEW, LEVL IV, 45-59 MIN: ICD-10-PCS | Mod: S$GLB,,, | Performed by: REGISTERED NURSE

## 2022-08-05 PROCEDURE — 99204 OFFICE O/P NEW MOD 45 MIN: CPT | Mod: S$GLB,,, | Performed by: REGISTERED NURSE

## 2022-08-05 PROCEDURE — 1159F PR MEDICATION LIST DOCUMENTED IN MEDICAL RECORD: ICD-10-PCS | Mod: CPTII,S$GLB,, | Performed by: REGISTERED NURSE

## 2022-08-05 PROCEDURE — 99999 PR PBB SHADOW E&M-EST. PATIENT-LVL III: ICD-10-PCS | Mod: PBBFAC,,, | Performed by: REGISTERED NURSE

## 2022-08-05 PROCEDURE — 1125F AMNT PAIN NOTED PAIN PRSNT: CPT | Mod: CPTII,S$GLB,, | Performed by: REGISTERED NURSE

## 2022-08-05 PROCEDURE — 1125F PR PAIN SEVERITY QUANTIFIED, PAIN PRESENT: ICD-10-PCS | Mod: CPTII,S$GLB,, | Performed by: REGISTERED NURSE

## 2022-08-05 PROCEDURE — 1101F PR PT FALLS ASSESS DOC 0-1 FALLS W/OUT INJ PAST YR: ICD-10-PCS | Mod: CPTII,S$GLB,, | Performed by: REGISTERED NURSE

## 2022-08-05 PROCEDURE — 1159F MED LIST DOCD IN RCRD: CPT | Mod: CPTII,S$GLB,, | Performed by: REGISTERED NURSE

## 2022-08-05 PROCEDURE — 3288F PR FALLS RISK ASSESSMENT DOCUMENTED: ICD-10-PCS | Mod: CPTII,S$GLB,, | Performed by: REGISTERED NURSE

## 2022-08-05 PROCEDURE — 3288F FALL RISK ASSESSMENT DOCD: CPT | Mod: CPTII,S$GLB,, | Performed by: REGISTERED NURSE

## 2022-08-05 PROCEDURE — 1101F PT FALLS ASSESS-DOCD LE1/YR: CPT | Mod: CPTII,S$GLB,, | Performed by: REGISTERED NURSE

## 2022-08-05 PROCEDURE — 1160F RVW MEDS BY RX/DR IN RCRD: CPT | Mod: CPTII,S$GLB,, | Performed by: REGISTERED NURSE

## 2022-08-18 ENCOUNTER — TELEPHONE (OUTPATIENT)
Dept: FAMILY MEDICINE | Facility: CLINIC | Age: 77
End: 2022-08-18
Payer: MEDICARE

## 2022-08-18 NOTE — TELEPHONE ENCOUNTER
Wife requested call back in October-  I called the patient to schedule their free one hour AWV appointment.

## 2022-08-18 NOTE — PROGRESS NOTES
Subjective:     HPI    I had the pleasure of seeing Ezekiel Mueller in follow-up for his history of AF. He was last seen in 1/2022. 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 He was seen by cardiology several hours later, at which point he was back in sinus.    Echo in 9/2021 showed an EF of 60% and normal LA size. [facility]. A 48 hour Holter performed in 10/2021 showed sinus rhythm with an average HR of 74 bpm, no arrhythmias. A 2 week Zio monitor performed in 11/2021 showed an AF burden of 7%, with the longest episode lasting 15h 26m. Average HR in  bpm. He was asymptomatic while wearing it. He is on xarelto for stroke prophylaxis.    At his visit with me in 1/2022 sotalol 80 mg bid was started.    Today in the office Mr. Mueller denies recurrent AF. He takes his BP regularly and states he has not had any indications of AF. No bleeding issues on xarelto.    My interpretation of today's ECG is sinus rhythm with RBBB and QTc of 452 ms.    Review of Systems   Constitutional: Negative for decreased appetite, malaise/fatigue, weight gain and weight loss.   HENT: Negative for sore throat.    Eyes: Negative for blurred vision.   Cardiovascular: Negative for chest pain, dyspnea on exertion, irregular heartbeat, leg swelling, near-syncope, orthopnea, palpitations, paroxysmal nocturnal dyspnea and syncope.   Respiratory: Negative for shortness of breath.    Skin: Negative for rash.   Musculoskeletal: Negative for arthritis.   Gastrointestinal: Negative for abdominal pain.   Neurological: Negative for focal weakness.   Psychiatric/Behavioral: Negative for altered mental status.        Objective:    Physical Exam  Vitals and nursing note reviewed.   Constitutional:       General: He is not in acute distress.     Appearance: He is well-developed.   HENT:      Head:  Normocephalic and atraumatic.   Eyes:      General: No scleral icterus.     Pupils: Pupils are equal, round, and reactive to light.   Neck:      Thyroid: No thyromegaly.   Cardiovascular:      Rate and Rhythm: Regular rhythm.      Pulses: Normal pulses.      Heart sounds: Normal heart sounds. No murmur heard.    No friction rub. No gallop.   Pulmonary:      Effort: Pulmonary effort is normal.      Breath sounds: Normal breath sounds.   Abdominal:      General: Bowel sounds are normal. There is no distension.      Palpations: Abdomen is soft.      Tenderness: There is no abdominal tenderness.   Musculoskeletal:      Cervical back: Neck supple.   Skin:     General: Skin is warm and dry.      Findings: No rash.   Neurological:      Mental Status: He is alert and oriented to person, place, and time.   Psychiatric:         Behavior: Behavior normal.           Assessment:       1. Paroxysmal atrial fibrillation    2. Essential hypertension    3. Hyperlipidemia LDL goal <100         Plan:       In summary, Ezekiel Mueller is a 76 year old male with a history of symptomatic PAF. His [id number] Score is 3 (age, HTN) so he should continue xarelto.    Mr. Mueller had a 7% AF burden based on Zio and prior to antiarrhythmic initiation. He is now on sotalol 80 mg bid and denies recurrent arrhythmias.    Plan is for Zio in 11 months, follow-up 12 months with me.    Thank you for allowing me to participate in the care of this patient. Please do not hesitate to call me with any questions or concerns.

## 2022-08-22 ENCOUNTER — OFFICE VISIT (OUTPATIENT)
Dept: ELECTROPHYSIOLOGY | Facility: CLINIC | Age: 77
End: 2022-08-22
Payer: MEDICARE

## 2022-08-22 VITALS
HEART RATE: 62 BPM | HEIGHT: 73 IN | WEIGHT: 206.56 LBS | SYSTOLIC BLOOD PRESSURE: 124 MMHG | DIASTOLIC BLOOD PRESSURE: 78 MMHG | BODY MASS INDEX: 27.37 KG/M2

## 2022-08-22 DIAGNOSIS — E78.5 HYPERLIPIDEMIA LDL GOAL <100: ICD-10-CM

## 2022-08-22 DIAGNOSIS — I48.0 PAROXYSMAL ATRIAL FIBRILLATION: Primary | ICD-10-CM

## 2022-08-22 DIAGNOSIS — I10 ESSENTIAL HYPERTENSION: ICD-10-CM

## 2022-08-22 DIAGNOSIS — I49.8 OTHER SPECIFIED CARDIAC ARRHYTHMIAS: ICD-10-CM

## 2022-08-22 PROCEDURE — 1160F RVW MEDS BY RX/DR IN RCRD: CPT | Mod: CPTII,S$GLB,, | Performed by: INTERNAL MEDICINE

## 2022-08-22 PROCEDURE — 93010 RHYTHM STRIP: ICD-10-PCS | Mod: S$GLB,,, | Performed by: INTERNAL MEDICINE

## 2022-08-22 PROCEDURE — 99999 PR PBB SHADOW E&M-EST. PATIENT-LVL III: ICD-10-PCS | Mod: PBBFAC,,, | Performed by: INTERNAL MEDICINE

## 2022-08-22 PROCEDURE — 1100F PR PT FALLS ASSESS DOC 2+ FALLS/FALL W/INJURY/YR: ICD-10-PCS | Mod: CPTII,S$GLB,, | Performed by: INTERNAL MEDICINE

## 2022-08-22 PROCEDURE — 93005 RHYTHM STRIP: ICD-10-PCS | Mod: S$GLB,,, | Performed by: INTERNAL MEDICINE

## 2022-08-22 PROCEDURE — 99214 OFFICE O/P EST MOD 30 MIN: CPT | Mod: S$GLB,,, | Performed by: INTERNAL MEDICINE

## 2022-08-22 PROCEDURE — 1100F PTFALLS ASSESS-DOCD GE2>/YR: CPT | Mod: CPTII,S$GLB,, | Performed by: INTERNAL MEDICINE

## 2022-08-22 PROCEDURE — 3074F SYST BP LT 130 MM HG: CPT | Mod: CPTII,S$GLB,, | Performed by: INTERNAL MEDICINE

## 2022-08-22 PROCEDURE — 93010 ELECTROCARDIOGRAM REPORT: CPT | Mod: S$GLB,,, | Performed by: INTERNAL MEDICINE

## 2022-08-22 PROCEDURE — 3288F FALL RISK ASSESSMENT DOCD: CPT | Mod: CPTII,S$GLB,, | Performed by: INTERNAL MEDICINE

## 2022-08-22 PROCEDURE — 1126F AMNT PAIN NOTED NONE PRSNT: CPT | Mod: CPTII,S$GLB,, | Performed by: INTERNAL MEDICINE

## 2022-08-22 PROCEDURE — 1160F PR REVIEW ALL MEDS BY PRESCRIBER/CLIN PHARMACIST DOCUMENTED: ICD-10-PCS | Mod: CPTII,S$GLB,, | Performed by: INTERNAL MEDICINE

## 2022-08-22 PROCEDURE — 3078F PR MOST RECENT DIASTOLIC BLOOD PRESSURE < 80 MM HG: ICD-10-PCS | Mod: CPTII,S$GLB,, | Performed by: INTERNAL MEDICINE

## 2022-08-22 PROCEDURE — 93005 ELECTROCARDIOGRAM TRACING: CPT | Mod: S$GLB,,, | Performed by: INTERNAL MEDICINE

## 2022-08-22 PROCEDURE — 1159F PR MEDICATION LIST DOCUMENTED IN MEDICAL RECORD: ICD-10-PCS | Mod: CPTII,S$GLB,, | Performed by: INTERNAL MEDICINE

## 2022-08-22 PROCEDURE — 99999 PR PBB SHADOW E&M-EST. PATIENT-LVL III: CPT | Mod: PBBFAC,,, | Performed by: INTERNAL MEDICINE

## 2022-08-22 PROCEDURE — 1126F PR PAIN SEVERITY QUANTIFIED, NO PAIN PRESENT: ICD-10-PCS | Mod: CPTII,S$GLB,, | Performed by: INTERNAL MEDICINE

## 2022-08-22 PROCEDURE — 99214 PR OFFICE/OUTPT VISIT, EST, LEVL IV, 30-39 MIN: ICD-10-PCS | Mod: S$GLB,,, | Performed by: INTERNAL MEDICINE

## 2022-08-22 PROCEDURE — 3288F PR FALLS RISK ASSESSMENT DOCUMENTED: ICD-10-PCS | Mod: CPTII,S$GLB,, | Performed by: INTERNAL MEDICINE

## 2022-08-22 PROCEDURE — 3078F DIAST BP <80 MM HG: CPT | Mod: CPTII,S$GLB,, | Performed by: INTERNAL MEDICINE

## 2022-08-22 PROCEDURE — 3074F PR MOST RECENT SYSTOLIC BLOOD PRESSURE < 130 MM HG: ICD-10-PCS | Mod: CPTII,S$GLB,, | Performed by: INTERNAL MEDICINE

## 2022-08-22 PROCEDURE — 1159F MED LIST DOCD IN RCRD: CPT | Mod: CPTII,S$GLB,, | Performed by: INTERNAL MEDICINE

## 2022-10-16 NOTE — PROGRESS NOTES
Ezekiel Mueller presented for follow up Medicare AWV today. The following components were reviewed and updated:    Medical history  Family History  Social history  Allergies and Current Medications  Health Risk Assessment  Health Maintenance  Care Team    **See Completed Assessments for Annual Wellness visit with in the encounter summary    The following assessments were completed:  Depression Screening  Cognitive function Screening      Timed Get Up Test  Whisper Test    Wt Readings from Last 3 Encounters:   10/21/22 95.7 kg (210 lb 15.7 oz)   08/22/22 93.7 kg (206 lb 9.1 oz)   08/05/22 91.6 kg (201 lb 15.1 oz)     Temp Readings from Last 3 Encounters:   07/20/22 98.2 °F (36.8 °C) (Oral)   07/09/22 98 °F (36.7 °C) (Oral)   05/20/22 98.2 °F (36.8 °C) (Oral)     BP Readings from Last 3 Encounters:   10/21/22 124/86   08/22/22 124/78   07/20/22 (!) 162/76     Pulse Readings from Last 3 Encounters:   10/21/22 (!) 53   08/22/22 62   07/20/22 61       General: Well developed, well nourished. No distress.  HEENT: Head is normocephalic, atraumatic  Eyes: Clear conjunctiva.  Neck: Supple, symmetrical neck; trachea midline.  Lungs: Clear to auscultation bilaterally and normal respiratory effort.  Cardiovascular: Heart with regular rate and rhythm. No murmurs, gallops or rubs  Extremities: No LE edema. Pulses 2+ and symmetric.   Abdomen: Abdomen is soft, non-tender non-distended with normal bowel sounds.  Skin: Skin color, texture, turgor normal. No rashes.  Musculoskeletal: Normal gait.   Lymph Nodes: No cervical or supraclavicular adenopathy.  Neurologic:  No focal numbness or weakness.   Psychiatric: Not depressed.      Diagnoses and health risks identified today and associated recommendations/orders:  1. Encounter for preventive health examination  Here for Health Risk Assessment/Annual Wellness Visit.  Health maintenance reviewed and updated. Follow up in one year.    2. Closed fracture of transverse process of thoracic  vertebra, initial encounter  Stable, and pain has resolved. Injury from a fall in 7/2022; doing well, full recovery. Back to work and recreational activities, fishing and hunting. Followed by PCP.    3. Atrial fibrillation by electrocardiogram  HR: 53 (controlled)  Chronic, stable on current pharm therapy. Followed by PCP and Cardiology.    4. Atherosclerosis of aorta  Chronic, stable on current regimen.Followed by Cardiology and PCP.     5. Pulmonary hypertension  Chronic, stable on current regimen. Followed by Cardiology and PCP.     6. Essential hypertension  BP Readings from Last 3 Encounters:   10/21/22 124/86   08/22/22 124/78   07/20/22 (!) 162/76   Chronic, stable on current regimen. Followed by PCP.      7. Hyperlipidemia LDL goal <100  Lab Results   Component Value Date    CHOL 206 (H) 09/20/2021    CHOL 187 07/29/2021    CHOL 192 04/23/2021     Lab Results   Component Value Date    HDL 51 09/20/2021    HDL 46 07/29/2021    HDL 48 04/23/2021     Lab Results   Component Value Date    LDLCALC 119.8 09/20/2021    LDLCALC 114.8 07/29/2021    LDLCALC 122.0 04/23/2021     Lab Results   Component Value Date    TRIG 176 (H) 09/20/2021    TRIG 131 07/29/2021    TRIG 110 04/23/2021     Lab Results   Component Value Date    CHOLHDL 24.8 09/20/2021    CHOLHDL 24.6 07/29/2021    CHOLHDL 25.0 04/23/2021    Chronic, stable on current regimen. On statin. Followed by PCP and Cardiology.      8. Impaired fasting blood sugar  Lab Results   Component Value Date    HGBA1C 5.8 (H) 04/23/2021   Chronic, stable on current regimen. On metformin. Followed by PCP.    9. Fatty liver  Chronic, stable on current regimen. Followed by PCP and Hepatology.      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 Followed by PCP and Hepatology.     11. Polyp of colon, unspecified part of colon, unspecified type  Chronic, stable. Followed by PCP.     12. Hearing loss, unspecified hearing loss type, unspecified  laterality  Chronic, stable. Followed by PCP.    Immunizations:   Shingrix: declines  Flu: today   PNA: future     Provided Ezekiel with a 5-10 year written screening schedule and personal prevention plan. Recommendations were developed using the USPSTF age appropriate recommendations. Education, counseling, and referrals were provided as needed.  After Visit Summary printed and given to patient which includes a list of additional screenings\tests needed.     reviewed:   Review for Opioid Screening: Pt does not have Rx for Opioids       Review for Substance Use Disorders: Patient does not use substance          Medication List            Accurate as of October 21, 2022  9:08 AM. If you have any questions, ask your nurse or doctor.                CONTINUE taking these medications      amLODIPine 10 MG tablet  Commonly known as: NORVASC  Take 1 tablet (10 mg total) by mouth once daily. For blood pressure     atorvastatin 40 MG tablet  Commonly known as: LIPITOR  Take 1 tablet (40 mg total) by mouth once daily.     blood sugar diagnostic Strp  1 strip by Misc.(Non-Drug; Combo Route) route once daily.     blood-glucose meter Misc  1 kit by Misc.(Non-Drug; Combo Route) route 3 (three) times daily.     cholecalciferol (vitamin D3) 50 mcg (2,000 unit) Cap capsule  Commonly known as: VITAMIN D3     diclofenac sodium 1 % Gel  Commonly known as: VOLTAREN  Apply 2 g topically once daily.     erythromycin ophthalmic ointment  Commonly known as: ROMYCIN  Place into the left eye every evening. For 5 days for infection in left eye     fish oil-omega-3 fatty acids 300-1,000 mg capsule     gabapentin 100 MG capsule  Commonly known as: NEURONTIN  Take 1 capsule (100 mg total) by mouth every 8 (eight) hours as needed (Pain). May take up to 2 tabs every 8 hours if needed for pain     HEALTHY EYES ORAL     hydroCHLOROthiazide 25 MG tablet  Commonly known as: HYDRODIURIL  Take 1 tablet (25 mg total) by mouth once daily.     lancing  device Misc  Commonly known as: BD LANCET DEVICE  1 Units by Misc.(Non-Drug; Combo Route) route once daily. Please dispense lancets and strips - # 100     losartan 25 MG tablet  Commonly known as: COZAAR  Take 0.5 tablets (12.5 mg total) by mouth once daily.     metFORMIN 500 MG tablet  Commonly known as: GLUCOPHAGE  Take 1 tablet (500 mg total) by mouth 2 (two) times daily with meals.     multivitamin with minerals tablet     mupirocin 2 % ointment  Commonly known as: BACTROBAN  Apply topically 2 (two) times daily.     naproxen 375 MG tablet  Commonly known as: NAPROSYN  Take 1 tablet (375 mg total) by mouth 2 (two) times daily with meals.     ondansetron 4 MG tablet  Commonly known as: ZOFRAN  Take 1 tablet (4 mg total) by mouth every 6 (six) hours.     oxyCODONE-acetaminophen 5-325 mg per tablet  Commonly known as: PERCOCET  Take 1 tablet by mouth every 4 (four) hours as needed for Pain.     rivaroxaban 20 mg Tab  Commonly known as: XARELTO  Take 1 tablet (20 mg total) by mouth once daily.     sotaloL 80 MG tablet  Commonly known as: BETAPACE  Take 1 tablet (80 mg total) by mouth 2 (two) times daily. First dose on Wednesday Feb 2 in AM     TRUEPLUS LANCETS 33 gauge Misc  Generic drug: lancets  USE AS DIRECTED THREE TIMES DAILY AS NEEDED              KIRK Blanco      I offered to discuss advanced care planning, including how to pick a person who would make decisions for you if you were unable to make them for yourself, called a health care power of , and what kind of decisions you might make such as use of life sustaining treatments such as ventilators and tube feeding when faced with a life limiting illness recorded on a living will that they will need to know. (How you want to be cared for as you near the end of your natural life)     X Patient is interested in learning more about how to make advanced directives.  I provided them paperwork and offered to discuss this with them. (Proxy,  Wife: Marcelle Mueller)

## 2022-10-19 ENCOUNTER — TELEPHONE (OUTPATIENT)
Dept: ADMINISTRATIVE | Facility: CLINIC | Age: 77
End: 2022-10-19
Payer: MEDICARE

## 2022-10-21 ENCOUNTER — IMMUNIZATION (OUTPATIENT)
Dept: INTERNAL MEDICINE | Facility: CLINIC | Age: 77
End: 2022-10-21
Payer: MEDICARE

## 2022-10-21 ENCOUNTER — OFFICE VISIT (OUTPATIENT)
Dept: INTERNAL MEDICINE | Facility: CLINIC | Age: 77
End: 2022-10-21
Payer: MEDICARE

## 2022-10-21 VITALS
HEIGHT: 73 IN | WEIGHT: 211 LBS | OXYGEN SATURATION: 97 % | HEART RATE: 53 BPM | SYSTOLIC BLOOD PRESSURE: 124 MMHG | DIASTOLIC BLOOD PRESSURE: 86 MMHG | BODY MASS INDEX: 27.96 KG/M2

## 2022-10-21 DIAGNOSIS — S22.009A CLOSED FRACTURE OF TRANSVERSE PROCESS OF THORACIC VERTEBRA, INITIAL ENCOUNTER: ICD-10-CM

## 2022-10-21 DIAGNOSIS — E78.5 HYPERLIPIDEMIA LDL GOAL <100: ICD-10-CM

## 2022-10-21 DIAGNOSIS — I48.0 PAROXYSMAL ATRIAL FIBRILLATION: ICD-10-CM

## 2022-10-21 DIAGNOSIS — I10 ESSENTIAL HYPERTENSION: ICD-10-CM

## 2022-10-21 DIAGNOSIS — I70.0 ATHEROSCLEROSIS OF AORTA: ICD-10-CM

## 2022-10-21 DIAGNOSIS — K63.5 POLYP OF COLON, UNSPECIFIED PART OF COLON, UNSPECIFIED TYPE: ICD-10-CM

## 2022-10-21 DIAGNOSIS — H91.90 HEARING LOSS, UNSPECIFIED HEARING LOSS TYPE, UNSPECIFIED LATERALITY: ICD-10-CM

## 2022-10-21 DIAGNOSIS — K76.0 FATTY LIVER: ICD-10-CM

## 2022-10-21 DIAGNOSIS — K74.01 EARLY HEPATIC FIBROSIS: ICD-10-CM

## 2022-10-21 DIAGNOSIS — R73.01 IMPAIRED FASTING BLOOD SUGAR: ICD-10-CM

## 2022-10-21 DIAGNOSIS — I27.20 PULMONARY HYPERTENSION: ICD-10-CM

## 2022-10-21 DIAGNOSIS — Z00.00 ENCOUNTER FOR PREVENTIVE HEALTH EXAMINATION: Primary | ICD-10-CM

## 2022-10-21 PROCEDURE — G0008 FLU VACCINE - QUADRIVALENT - ADJUVANTED: ICD-10-PCS | Mod: S$GLB,,, | Performed by: INTERNAL MEDICINE

## 2022-10-21 PROCEDURE — 3074F SYST BP LT 130 MM HG: CPT | Mod: CPTII,S$GLB,, | Performed by: NURSE PRACTITIONER

## 2022-10-21 PROCEDURE — 1101F PT FALLS ASSESS-DOCD LE1/YR: CPT | Mod: CPTII,S$GLB,, | Performed by: NURSE PRACTITIONER

## 2022-10-21 PROCEDURE — 99999 PR PBB SHADOW E&M-EST. PATIENT-LVL V: ICD-10-PCS | Mod: PBBFAC,,, | Performed by: NURSE PRACTITIONER

## 2022-10-21 PROCEDURE — 1125F AMNT PAIN NOTED PAIN PRSNT: CPT | Mod: CPTII,S$GLB,, | Performed by: NURSE PRACTITIONER

## 2022-10-21 PROCEDURE — 99499 RISK ADDL DX/OHS AUDIT: ICD-10-PCS | Mod: HCNC,S$GLB,, | Performed by: NURSE PRACTITIONER

## 2022-10-21 PROCEDURE — 99999 PR PBB SHADOW E&M-EST. PATIENT-LVL V: CPT | Mod: PBBFAC,,, | Performed by: NURSE PRACTITIONER

## 2022-10-21 PROCEDURE — 1159F MED LIST DOCD IN RCRD: CPT | Mod: CPTII,S$GLB,, | Performed by: NURSE PRACTITIONER

## 2022-10-21 PROCEDURE — 3074F PR MOST RECENT SYSTOLIC BLOOD PRESSURE < 130 MM HG: ICD-10-PCS | Mod: CPTII,S$GLB,, | Performed by: NURSE PRACTITIONER

## 2022-10-21 PROCEDURE — G0439 PPPS, SUBSEQ VISIT: HCPCS | Mod: S$GLB,,, | Performed by: NURSE PRACTITIONER

## 2022-10-21 PROCEDURE — 90694 FLU VACCINE - QUADRIVALENT - ADJUVANTED: ICD-10-PCS | Mod: S$GLB,,, | Performed by: INTERNAL MEDICINE

## 2022-10-21 PROCEDURE — G0439 PR MEDICARE ANNUAL WELLNESS SUBSEQUENT VISIT: ICD-10-PCS | Mod: S$GLB,,, | Performed by: NURSE PRACTITIONER

## 2022-10-21 PROCEDURE — 1170F FXNL STATUS ASSESSED: CPT | Mod: CPTII,S$GLB,, | Performed by: NURSE PRACTITIONER

## 2022-10-21 PROCEDURE — 3288F FALL RISK ASSESSMENT DOCD: CPT | Mod: CPTII,S$GLB,, | Performed by: NURSE PRACTITIONER

## 2022-10-21 PROCEDURE — 3288F PR FALLS RISK ASSESSMENT DOCUMENTED: ICD-10-PCS | Mod: CPTII,S$GLB,, | Performed by: NURSE PRACTITIONER

## 2022-10-21 PROCEDURE — 1159F PR MEDICATION LIST DOCUMENTED IN MEDICAL RECORD: ICD-10-PCS | Mod: CPTII,S$GLB,, | Performed by: NURSE PRACTITIONER

## 2022-10-21 PROCEDURE — 3079F DIAST BP 80-89 MM HG: CPT | Mod: CPTII,S$GLB,, | Performed by: NURSE PRACTITIONER

## 2022-10-21 PROCEDURE — 99499 UNLISTED E&M SERVICE: CPT | Mod: HCNC,S$GLB,, | Performed by: NURSE PRACTITIONER

## 2022-10-21 PROCEDURE — 1160F PR REVIEW ALL MEDS BY PRESCRIBER/CLIN PHARMACIST DOCUMENTED: ICD-10-PCS | Mod: CPTII,S$GLB,, | Performed by: NURSE PRACTITIONER

## 2022-10-21 PROCEDURE — 1125F PR PAIN SEVERITY QUANTIFIED, PAIN PRESENT: ICD-10-PCS | Mod: CPTII,S$GLB,, | Performed by: NURSE PRACTITIONER

## 2022-10-21 PROCEDURE — 90694 VACC AIIV4 NO PRSRV 0.5ML IM: CPT | Mod: S$GLB,,, | Performed by: INTERNAL MEDICINE

## 2022-10-21 PROCEDURE — 1160F RVW MEDS BY RX/DR IN RCRD: CPT | Mod: CPTII,S$GLB,, | Performed by: NURSE PRACTITIONER

## 2022-10-21 PROCEDURE — 1170F PR FUNCTIONAL STATUS ASSESSED: ICD-10-PCS | Mod: CPTII,S$GLB,, | Performed by: NURSE PRACTITIONER

## 2022-10-21 PROCEDURE — 3079F PR MOST RECENT DIASTOLIC BLOOD PRESSURE 80-89 MM HG: ICD-10-PCS | Mod: CPTII,S$GLB,, | Performed by: NURSE PRACTITIONER

## 2022-10-21 PROCEDURE — 1101F PR PT FALLS ASSESS DOC 0-1 FALLS W/OUT INJ PAST YR: ICD-10-PCS | Mod: CPTII,S$GLB,, | Performed by: NURSE PRACTITIONER

## 2022-10-21 PROCEDURE — G0008 ADMIN INFLUENZA VIRUS VAC: HCPCS | Mod: S$GLB,,, | Performed by: INTERNAL MEDICINE

## 2022-10-21 NOTE — PATIENT INSTRUCTIONS
Counseling and Referral of Other Preventative  (Italic type indicates deductible and co-insurance are waived)    Patient Name: Ezekiel Mueller  Today's Date: 10/21/2022    Health Maintenance       Date Due Completion Date    Shingles Vaccine (1 of 2) Never done ---    COVID-19 Vaccine (3 - Booster for Moderna series) 08/03/2021 6/8/2021    Pneumococcal Vaccines (Age 65+) (2 - PPSV23 if available, else PCV20) 09/03/2021 9/3/2020    Influenza Vaccine (1) 09/01/2022 10/23/2020    High Dose Statin 08/22/2023 8/22/2022    Colorectal Cancer Screening 07/30/2026 7/30/2021    Override on 8/6/2014: Done ([facility])    Lipid Panel 09/20/2026 9/20/2021    TETANUS VACCINE 05/13/2029 5/13/2019        No orders of the defined types were placed in this encounter.      The following information is provided to all patients.  This information is to help you find resources for any of the problems found today that may be affecting your health:                Living healthy guide: www.[facility].[place].gov      Understanding Diabetes: www.diabetes.org      Eating healthy: www.cdc.gov/healthyweight      CDC home safety checklist: www.cdc.gov/steadi/patient.html      Agency on Aging: www.goea.[place].gov      Alcoholics anonymous (AA): www.aa.org      Physical Activity: www.ghazala.nih.gov/aw9kcjr      Tobacco use: www.quitwithusla.org

## 2023-02-07 DIAGNOSIS — Z00.00 ENCOUNTER FOR MEDICARE ANNUAL WELLNESS EXAM: ICD-10-CM

## 2023-02-09 DIAGNOSIS — Z00.00 ENCOUNTER FOR MEDICARE ANNUAL WELLNESS EXAM: ICD-10-CM

## 2023-03-24 ENCOUNTER — PES CALL (OUTPATIENT)
Dept: ADMINISTRATIVE | Facility: CLINIC | Age: 78
End: 2023-03-24
Payer: MEDICARE

## 2023-03-29 ENCOUNTER — HOSPITAL ENCOUNTER (OUTPATIENT)
Dept: RADIOLOGY | Facility: HOSPITAL | Age: 78
Discharge: HOME | End: 2023-03-29
Attending: INTERNAL MEDICINE
Payer: MEDICARE

## 2023-03-29 ENCOUNTER — OFFICE VISIT (OUTPATIENT)
Dept: INTERNAL MEDICINE | Facility: CLINIC | Age: 78
End: 2023-03-29
Payer: MEDICARE

## 2023-03-29 VITALS
BODY MASS INDEX: 29.08 KG/M2 | HEART RATE: 63 BPM | WEIGHT: 219.38 LBS | SYSTOLIC BLOOD PRESSURE: 126 MMHG | DIASTOLIC BLOOD PRESSURE: 66 MMHG | OXYGEN SATURATION: 100 % | HEIGHT: 73 IN

## 2023-03-29 DIAGNOSIS — Z12.83 SKIN EXAM, SCREENING FOR CANCER: ICD-10-CM

## 2023-03-29 DIAGNOSIS — G89.29 CHRONIC LEFT SHOULDER PAIN: ICD-10-CM

## 2023-03-29 DIAGNOSIS — R22.32 SUBCUTANEOUS NODULE OF LEFT UPPER EXTREMITY: ICD-10-CM

## 2023-03-29 DIAGNOSIS — R73.01 IMPAIRED FASTING BLOOD SUGAR: ICD-10-CM

## 2023-03-29 DIAGNOSIS — M25.512 CHRONIC LEFT SHOULDER PAIN: Primary | ICD-10-CM

## 2023-03-29 DIAGNOSIS — G89.29 CHRONIC LEFT SHOULDER PAIN: Primary | ICD-10-CM

## 2023-03-29 DIAGNOSIS — E78.5 HYPERLIPIDEMIA LDL GOAL <100: ICD-10-CM

## 2023-03-29 DIAGNOSIS — S46.009A ROTATOR CUFF INJURY, INITIAL ENCOUNTER: ICD-10-CM

## 2023-03-29 DIAGNOSIS — I10 ESSENTIAL HYPERTENSION: ICD-10-CM

## 2023-03-29 DIAGNOSIS — E79.0 HYPERURICEMIA: ICD-10-CM

## 2023-03-29 DIAGNOSIS — M25.512 CHRONIC LEFT SHOULDER PAIN: ICD-10-CM

## 2023-03-29 PROCEDURE — 1159F MED LIST DOCD IN RCRD: CPT | Mod: HCNC,CPTII,S$GLB, | Performed by: INTERNAL MEDICINE

## 2023-03-29 PROCEDURE — 3074F PR MOST RECENT SYSTOLIC BLOOD PRESSURE < 130 MM HG: ICD-10-PCS | Mod: HCNC,CPTII,S$GLB, | Performed by: INTERNAL MEDICINE

## 2023-03-29 PROCEDURE — 1101F PR PT FALLS ASSESS DOC 0-1 FALLS W/OUT INJ PAST YR: ICD-10-PCS | Mod: HCNC,CPTII,S$GLB, | Performed by: INTERNAL MEDICINE

## 2023-03-29 PROCEDURE — 99214 OFFICE O/P EST MOD 30 MIN: CPT | Mod: HCNC,S$GLB,, | Performed by: INTERNAL MEDICINE

## 2023-03-29 PROCEDURE — 3074F SYST BP LT 130 MM HG: CPT | Mod: HCNC,CPTII,S$GLB, | Performed by: INTERNAL MEDICINE

## 2023-03-29 PROCEDURE — 3078F PR MOST RECENT DIASTOLIC BLOOD PRESSURE < 80 MM HG: ICD-10-PCS | Mod: HCNC,CPTII,S$GLB, | Performed by: INTERNAL MEDICINE

## 2023-03-29 PROCEDURE — 1125F PR PAIN SEVERITY QUANTIFIED, PAIN PRESENT: ICD-10-PCS | Mod: HCNC,CPTII,S$GLB, | Performed by: INTERNAL MEDICINE

## 2023-03-29 PROCEDURE — 3078F DIAST BP <80 MM HG: CPT | Mod: HCNC,CPTII,S$GLB, | Performed by: INTERNAL MEDICINE

## 2023-03-29 PROCEDURE — 3288F FALL RISK ASSESSMENT DOCD: CPT | Mod: HCNC,CPTII,S$GLB, | Performed by: INTERNAL MEDICINE

## 2023-03-29 PROCEDURE — 1159F PR MEDICATION LIST DOCUMENTED IN MEDICAL RECORD: ICD-10-PCS | Mod: HCNC,CPTII,S$GLB, | Performed by: INTERNAL MEDICINE

## 2023-03-29 PROCEDURE — 99214 PR OFFICE/OUTPT VISIT, EST, LEVL IV, 30-39 MIN: ICD-10-PCS | Mod: HCNC,S$GLB,, | Performed by: INTERNAL MEDICINE

## 2023-03-29 PROCEDURE — 99999 PR PBB SHADOW E&M-EST. PATIENT-LVL V: ICD-10-PCS | Mod: PBBFAC,HCNC,, | Performed by: INTERNAL MEDICINE

## 2023-03-29 PROCEDURE — 99999 PR PBB SHADOW E&M-EST. PATIENT-LVL V: CPT | Mod: PBBFAC,HCNC,, | Performed by: INTERNAL MEDICINE

## 2023-03-29 PROCEDURE — 1125F AMNT PAIN NOTED PAIN PRSNT: CPT | Mod: HCNC,CPTII,S$GLB, | Performed by: INTERNAL MEDICINE

## 2023-03-29 PROCEDURE — 73060 XR HUMERUS 2 VIEW LEFT: ICD-10-PCS | Mod: 26,HCNC,LT, | Performed by: RADIOLOGY

## 2023-03-29 PROCEDURE — 1101F PT FALLS ASSESS-DOCD LE1/YR: CPT | Mod: HCNC,CPTII,S$GLB, | Performed by: INTERNAL MEDICINE

## 2023-03-29 PROCEDURE — 3288F PR FALLS RISK ASSESSMENT DOCUMENTED: ICD-10-PCS | Mod: HCNC,CPTII,S$GLB, | Performed by: INTERNAL MEDICINE

## 2023-03-29 PROCEDURE — 73060 X-RAY EXAM OF HUMERUS: CPT | Mod: TC,HCNC,LT

## 2023-03-29 PROCEDURE — 73060 X-RAY EXAM OF HUMERUS: CPT | Mod: 26,HCNC,LT, | Performed by: RADIOLOGY

## 2023-03-29 RX ORDER — METHOCARBAMOL 500 MG/1
500 TABLET, FILM COATED ORAL 3 TIMES DAILY PRN
Qty: 30 TABLET | Refills: 1 | Status: SHIPPED | OUTPATIENT
Start: 2023-03-29 | End: 2023-04-08

## 2023-03-29 NOTE — PATIENT INSTRUCTIONS
X-rays of your arm and shoulder today  Schedule fasting labwork  Schedule dermatology appointment  Schedule orthopedic appointment    Referral placed to physical therapy, they should be contacting you.    Return to clinic in 4 weeks.

## 2023-03-29 NOTE — PROGRESS NOTES
Subjective:       Patient ID: Ezekiel Mueller is a 77 y.o. male.    Chief Complaint: Arm Pain      HPI  Ezekiel Mueller is a 77 y.o. year old male established patient presents for urgent care visit regarding LUE shoulder / upper arm pain. On going for several months. Diminsihed range of motion  Tender bulge noted on lateral aspect of upper humerus; non mobile.     ROM restricted, in particular with abduction of shoulder and extension.   Suspect rotator cuff injury / adhesive capsulitis.  No numbness / no tingling.    Review of Systems   Respiratory:  Negative for shortness of breath.    Cardiovascular:  Negative for chest pain.   Musculoskeletal:  Positive for arthralgias and myalgias.   Skin:  Positive for wound (2 cm healing wound on mid R dorsum of forearm; recurrent injury).       Past Medical History:   Diagnosis Date    Basal cell carcinoma 02/2015    R eyelid    Borderline diabetes 2016    Colon polyps     [facility] none [facility]    Elevated blood uric acid level 2016    Fatty liver     Hypertension     diagnosed 2005    Pancreatitis 07/2015    etiology unclear        Prior to Admission medications    Medication Sig Start Date End Date Taking? Authorizing Provider   amLODIPine (NORVASC) 10 MG tablet Take 1 tablet (10 mg total) by mouth once daily. For blood pressure 11/15/22  Yes Vega Ferrer MD   atorvastatin (LIPITOR) 40 MG tablet Take 1 tablet (40 mg total) by mouth once daily. 3/13/23 3/12/24 Yes Vega Ferrer MD   blood sugar diagnostic Strp 1 strip by Misc.(Non-Drug; Combo Route) route once daily. 2/20/18  Yes Joshua Leija MD   cholecalciferol, vitamin D3, (VITAMIN D3) 50 mcg (2,000 unit) Cap Take 1 capsule by mouth once daily.   Yes Historical Provider   diclofenac sodium (VOLTAREN) 1 % Gel Apply 2 g topically once daily. 7/15/22  Yes KIRK Odom   erythromycin (ROMYCIN) ophthalmic ointment Place into the left eye every evening. For 5 days for infection in  left eye 7/14/21  Yes Tiffany Figueredo DNP   gabapentin (NEURONTIN) 100 MG capsule Take 1 capsule (100 mg total) by mouth every 8 (eight) hours as needed (Pain). May take up to 2 tabs every 8 hours if needed for pain 7/15/22 7/15/23 Yes KIRK Odom   hydroCHLOROthiazide (HYDRODIURIL) 25 MG tablet Take 1 tablet (25 mg total) by mouth once daily. 1/12/23  Yes Ye Dudley MD   losartan (COZAAR) 25 MG tablet Take 0.5 tablets (12.5 mg total) by mouth once daily. 1/12/23  Yes Ye Dudley MD   metFORMIN (GLUCOPHAGE) 500 MG tablet Take 1 tablet (500 mg total) by mouth 2 (two) times daily with meals. 1/12/23  Yes Ye Dudley MD   multivitamin with minerals tablet Take 1 tablet by mouth once daily.   Yes Historical Provider   mupirocin (BACTROBAN) 2 % ointment Apply topically 2 (two) times daily. 7/15/22  Yes KIRK Odom   naproxen (NAPROSYN) 375 MG tablet Take 1 tablet (375 mg total) by mouth 2 (two) times daily with meals. 7/20/22  Yes Van Nova Jr., MD   omega-3 fatty acids/fish oil (FISH OIL-OMEGA-3 FATTY ACIDS) 300-1,000 mg capsule Take by mouth once daily.   Yes Historical Provider   ondansetron (ZOFRAN) 4 MG tablet Take 1 tablet (4 mg total) by mouth every 6 (six) hours. 7/9/22  Yes Halina Goldsmith MD   rivaroxaban (XARELTO) 20 mg Tab Take 1 tablet (20 mg total) by mouth Daily. 3/2/23  Yes Vega Fererr MD   sotaloL (BETAPACE) 80 MG tablet Take 1 tablet (80 mg total) by mouth 2 (two) times daily. First dose on Wednesday Feb 2 in AM 1/10/23 1/10/24 Yes Fermín Trevino MD   TRUEPLUS LANCETS 33 gauge Misc USE AS DIRECTED THREE TIMES DAILY AS NEEDED 12/12/18  Yes Joshua Leija MD   vits A,C,E/lutein/minerals (HEALTHY EYES ORAL) Take by mouth once daily.   Yes Historical Provider   blood-glucose meter Misc 1 kit by Misc.(Non-Drug; Combo Route) route 3 (three) times daily. 2/20/18 9/20/21  Joshua Leija MD   lancing device (BD LANCET DEVICE) Misc 1 Units by Misc.(Non-Drug; Combo  "Route) route once daily. Please dispense lancets and strips - # 100 2/19/18 9/20/21  Joshua Leija MD   oxyCODONE-acetaminophen (PERCOCET) 5-325 mg per tablet Take 1 tablet by mouth every 4 (four) hours as needed for Pain.  Patient not taking: Reported on 10/21/2022 7/20/22   Van Nova Jr., MD        Past medical history, surgical history, and family medical history reviewed and updated as appropriate.    Medications and allergies reviewed.     Objective:          Vitals:    03/29/23 1004   BP: 126/66   BP Location: Right arm   Patient Position: Sitting   Pulse: 63   SpO2: 100%   Weight: 99.5 kg (219 lb 5.7 oz)   Height: 6' 1" (1.854 m)     Body mass index is 28.94 kg/m².  Physical Exam  Cardiovascular:      Rate and Rhythm: Normal rate.   Pulmonary:      Effort: Pulmonary effort is normal.   Musculoskeletal:         General: Tenderness present.      Comments: Abnormal / restricted ROM of L shoulder   Neurological:      General: No focal deficit present.      Mental Status: He is alert. Mental status is at baseline.       Lab Results   Component Value Date    WBC 7.91 07/20/2022    HGB 12.8 (L) 07/20/2022    HCT 38.9 (L) 07/20/2022     07/20/2022    CHOL 206 (H) 09/20/2021    TRIG 176 (H) 09/20/2021    HDL 51 09/20/2021    ALT 12 07/20/2022    AST 13 07/20/2022     07/20/2022    K 3.8 07/20/2022    CL 98 07/20/2022    CREATININE 1.0 07/20/2022    BUN 15 07/20/2022    CO2 28 07/20/2022    TSH 5.512 (H) 09/20/2021    PSA 0.80 04/23/2021    INR 1.3 (H) 07/09/2022    HGBA1C 5.8 (H) 04/23/2021       Assessment:       1. Chronic left shoulder pain    2. Rotator cuff injury, initial encounter    3. Subcutaneous nodule of left upper extremity    4. Skin exam, screening for cancer    5. Hyperlipidemia LDL goal <100    6. Impaired fasting blood sugar    7. Hyperuricemia    8. Essential hypertension          Plan:     Ezekiel was seen today for arm pain.    Diagnoses and all orders for this " visit:    Chronic left shoulder pain  -     X-Ray Humerus 2 View Left; Future  -     Ambulatory referral/consult to Orthopedics; Future  -     Ambulatory referral/consult to Physical/Occupational Therapy; Future  -     methocarbamoL (ROBAXIN) 500 MG Tab; Take 1 tablet (500 mg total) by mouth 3 (three) times daily as needed (try taking only at night time, will make you drowsy.).    Rotator cuff injury, initial encounter    Subcutaneous nodule of left upper extremity  -     X-Ray Shoulder Trauma 3 view Left; Future  -     X-Ray Humerus 2 View Left; Future    Skin exam, screening for cancer  -     Ambulatory referral/consult to Dermatology; Future    Hyperlipidemia LDL goal <100  -     Lipid Panel; Future    Impaired fasting blood sugar  -     Hemoglobin A1C; Future    Hyperuricemia  -     URIC ACID; Future    Essential hypertension  -     CBC Auto Differential; Future  -     Comprehensive Metabolic Panel; Future  -     TSH; Future      Problem focused visit today.  Obtain imaging, referral to PT for initial management  Will refer to ortho  Due for skin check, hx of skin cancer  Will obtain routine labwork for annual exam in 4 weeks.    Health maintenance reviewed with patient. Will address in 4 weeks at annual exam.     Follow up in about 4 weeks (around 4/26/2023).    Ye Dudley MD  Internal Medicine / Primary Care  [facility]  3/29/2023

## 2023-03-31 ENCOUNTER — TELEPHONE (OUTPATIENT)
Dept: INTERNAL MEDICINE | Facility: CLINIC | Age: 78
End: 2023-03-31
Payer: MEDICARE

## 2023-03-31 ENCOUNTER — LAB VISIT (OUTPATIENT)
Dept: LAB | Facility: HOSPITAL | Age: 78
End: 2023-03-31
Attending: INTERNAL MEDICINE
Payer: MEDICARE

## 2023-03-31 DIAGNOSIS — R73.01 IMPAIRED FASTING BLOOD SUGAR: ICD-10-CM

## 2023-03-31 DIAGNOSIS — E79.0 HYPERURICEMIA: ICD-10-CM

## 2023-03-31 DIAGNOSIS — I10 ESSENTIAL HYPERTENSION: ICD-10-CM

## 2023-03-31 DIAGNOSIS — E78.5 HYPERLIPIDEMIA LDL GOAL <100: ICD-10-CM

## 2023-03-31 LAB
ALBUMIN SERPL BCP-MCNC: 4.2 G/DL (ref 3.5–5.2)
ALP SERPL-CCNC: 53 U/L (ref 55–135)
ALT SERPL W/O P-5'-P-CCNC: 30 U/L (ref 10–44)
ANION GAP SERPL CALC-SCNC: 8 MMOL/L (ref 8–16)
AST SERPL-CCNC: 24 U/L (ref 10–40)
BASOPHILS # BLD AUTO: 0.04 K/UL (ref 0–0.2)
BASOPHILS NFR BLD: 0.9 % (ref 0–1.9)
BILIRUB SERPL-MCNC: 0.9 MG/DL (ref 0.1–1)
BUN SERPL-MCNC: 14 MG/DL (ref 8–23)
CALCIUM SERPL-MCNC: 9.7 MG/DL (ref 8.7–10.5)
CHLORIDE SERPL-SCNC: 101 MMOL/L (ref 95–110)
CHOLEST SERPL-MCNC: 125 MG/DL (ref 120–199)
CHOLEST/HDLC SERPL: 3 {RATIO} (ref 2–5)
CO2 SERPL-SCNC: 32 MMOL/L (ref 23–29)
CREAT SERPL-MCNC: 1 MG/DL (ref 0.5–1.4)
DIFFERENTIAL METHOD: ABNORMAL
EOSINOPHIL # BLD AUTO: 0.2 K/UL (ref 0–0.5)
EOSINOPHIL NFR BLD: 3.8 % (ref 0–8)
ERYTHROCYTE [DISTWIDTH] IN BLOOD BY AUTOMATED COUNT: 13 % (ref 11.5–14.5)
EST. GFR  (NO RACE VARIABLE): >60 ML/MIN/1.73 M^2
ESTIMATED AVG GLUCOSE: 128 MG/DL (ref 68–131)
GLUCOSE SERPL-MCNC: 128 MG/DL (ref 70–110)
HBA1C MFR BLD: 6.1 % (ref 4–5.6)
HCT VFR BLD AUTO: 42.5 % (ref 40–54)
HDLC SERPL-MCNC: 41 MG/DL (ref 40–75)
HDLC SERPL: 32.8 % (ref 20–50)
HGB BLD-MCNC: 13.9 G/DL (ref 14–18)
IMM GRANULOCYTES # BLD AUTO: 0.01 K/UL (ref 0–0.04)
IMM GRANULOCYTES NFR BLD AUTO: 0.2 % (ref 0–0.5)
LDLC SERPL CALC-MCNC: 61.6 MG/DL (ref 63–159)
LYMPHOCYTES # BLD AUTO: 1.3 K/UL (ref 1–4.8)
LYMPHOCYTES NFR BLD: 29.4 % (ref 18–48)
MCH RBC QN AUTO: 28.8 PG (ref 27–31)
MCHC RBC AUTO-ENTMCNC: 32.7 G/DL (ref 32–36)
MCV RBC AUTO: 88 FL (ref 82–98)
MONOCYTES # BLD AUTO: 0.6 K/UL (ref 0.3–1)
MONOCYTES NFR BLD: 13 % (ref 4–15)
NEUTROPHILS # BLD AUTO: 2.4 K/UL (ref 1.8–7.7)
NEUTROPHILS NFR BLD: 52.7 % (ref 38–73)
NONHDLC SERPL-MCNC: 84 MG/DL
NRBC BLD-RTO: 0 /100 WBC
PLATELET # BLD AUTO: 194 K/UL (ref 150–450)
PMV BLD AUTO: 12.2 FL (ref 9.2–12.9)
POTASSIUM SERPL-SCNC: 3.6 MMOL/L (ref 3.5–5.1)
PROT SERPL-MCNC: 7.6 G/DL (ref 6–8.4)
RBC # BLD AUTO: 4.83 M/UL (ref 4.6–6.2)
SODIUM SERPL-SCNC: 141 MMOL/L (ref 136–145)
T4 FREE SERPL-MCNC: 0.98 NG/DL (ref 0.71–1.51)
TRIGL SERPL-MCNC: 112 MG/DL (ref 30–150)
TSH SERPL DL<=0.005 MIU/L-ACNC: 5.55 UIU/ML (ref 0.4–4)
URATE SERPL-MCNC: 7.2 MG/DL (ref 3.4–7)
WBC # BLD AUTO: 4.46 K/UL (ref 3.9–12.7)

## 2023-03-31 PROCEDURE — 83036 HEMOGLOBIN GLYCOSYLATED A1C: CPT | Mod: HCNC | Performed by: INTERNAL MEDICINE

## 2023-03-31 PROCEDURE — 80061 LIPID PANEL: CPT | Mod: HCNC | Performed by: INTERNAL MEDICINE

## 2023-03-31 PROCEDURE — 80053 COMPREHEN METABOLIC PANEL: CPT | Mod: HCNC | Performed by: INTERNAL MEDICINE

## 2023-03-31 PROCEDURE — 85025 COMPLETE CBC W/AUTO DIFF WBC: CPT | Mod: HCNC | Performed by: INTERNAL MEDICINE

## 2023-03-31 PROCEDURE — 84550 ASSAY OF BLOOD/URIC ACID: CPT | Mod: HCNC | Performed by: INTERNAL MEDICINE

## 2023-03-31 PROCEDURE — 84443 ASSAY THYROID STIM HORMONE: CPT | Mod: HCNC | Performed by: INTERNAL MEDICINE

## 2023-03-31 PROCEDURE — 84439 ASSAY OF FREE THYROXINE: CPT | Mod: HCNC | Performed by: INTERNAL MEDICINE

## 2023-03-31 PROCEDURE — 36415 COLL VENOUS BLD VENIPUNCTURE: CPT | Mod: HCNC | Performed by: INTERNAL MEDICINE

## 2023-03-31 RX ORDER — ALLOPURINOL 100 MG/1
100 TABLET ORAL DAILY
Qty: 90 TABLET | Refills: 3 | Status: SHIPPED | OUTPATIENT
Start: 2023-03-31

## 2023-03-31 NOTE — TELEPHONE ENCOUNTER
----- Message from Ye Dudley MD sent at 3/31/2023  3:10 PM CDT -----  Uric acid persistently elevated. Start allopurinol 100 mg daily. Prescription sent to mail order pharmacy. Follow up in 1 month as scheduled. Sometimes starting this medication may precipitate a gout flare. Let the clinic know if this occurs.

## 2023-04-10 ENCOUNTER — HOSPITAL ENCOUNTER (OUTPATIENT)
Dept: RADIOLOGY | Facility: HOSPITAL | Age: 78
Discharge: HOME | End: 2023-04-10
Attending: INTERNAL MEDICINE
Payer: MEDICARE

## 2023-04-10 DIAGNOSIS — R22.32 SUBCUTANEOUS NODULE OF LEFT UPPER EXTREMITY: ICD-10-CM

## 2023-04-10 PROCEDURE — 73030 X-RAY EXAM OF SHOULDER: CPT | Mod: TC,HCNC,LT

## 2023-04-10 PROCEDURE — 73030 X-RAY EXAM OF SHOULDER: CPT | Mod: 26,HCNC,LT, | Performed by: RADIOLOGY

## 2023-04-10 PROCEDURE — 73030 XR SHOULDER TRAUMA 3 VIEW LEFT: ICD-10-PCS | Mod: 26,HCNC,LT, | Performed by: RADIOLOGY

## 2023-04-11 ENCOUNTER — TELEPHONE (OUTPATIENT)
Dept: INTERNAL MEDICINE | Facility: CLINIC | Age: 78
End: 2023-04-11
Payer: MEDICARE

## 2023-04-11 NOTE — TELEPHONE ENCOUNTER
----- Message from Jennyfer Clark MA sent at 4/10/2023  4:18 PM CDT -----    ----- Message -----  From: Ye Dudley MD  Sent: 4/10/2023   3:53 PM CDT  To: [facility] Staff    Evidence of a previous fractured collar bone. Follow up with orthopedics as scheduled.

## 2023-04-12 ENCOUNTER — TELEPHONE (OUTPATIENT)
Dept: INTERNAL MEDICINE | Facility: CLINIC | Age: 78
End: 2023-04-12
Payer: MEDICARE

## 2023-04-12 NOTE — TELEPHONE ENCOUNTER
Pt returning phone call     Original message:  From: Ye Dudley MD  Sent: 4/10/2023   3:53 PM CDT  To: [facility] Staff     Evidence of a previous fractured collar bone. Follow up with orthopedics as scheduled.

## 2023-04-12 NOTE — TELEPHONE ENCOUNTER
----- Message from Jess Madrid sent at 4/12/2023  8:03 AM CDT -----  Contact: Marcelle galarza @ [phone]  Patient is returning a phone call.  Who left a message for the patient: Dr Dudley nurse  Does patient know what this is regarding:  missed call  Would you like a call back, or a response through your [facility] portal?:   Call   Comments:

## 2023-04-13 DIAGNOSIS — I48.0 PAF (PAROXYSMAL ATRIAL FIBRILLATION): Primary | ICD-10-CM

## 2023-04-21 ENCOUNTER — OFFICE VISIT (OUTPATIENT)
Dept: INTERNAL MEDICINE | Facility: CLINIC | Age: 78
End: 2023-04-21
Payer: MEDICARE

## 2023-04-21 VITALS
DIASTOLIC BLOOD PRESSURE: 78 MMHG | WEIGHT: 220.25 LBS | SYSTOLIC BLOOD PRESSURE: 122 MMHG | HEIGHT: 73 IN | BODY MASS INDEX: 29.19 KG/M2 | HEART RATE: 71 BPM | OXYGEN SATURATION: 96 %

## 2023-04-21 DIAGNOSIS — E78.5 HYPERLIPIDEMIA LDL GOAL <100: ICD-10-CM

## 2023-04-21 DIAGNOSIS — M25.512 CHRONIC LEFT SHOULDER PAIN: ICD-10-CM

## 2023-04-21 DIAGNOSIS — I10 ESSENTIAL HYPERTENSION: ICD-10-CM

## 2023-04-21 DIAGNOSIS — R73.01 IMPAIRED FASTING BLOOD SUGAR: ICD-10-CM

## 2023-04-21 DIAGNOSIS — Z00.00 ENCOUNTER FOR ANNUAL PHYSICAL EXAM: Primary | ICD-10-CM

## 2023-04-21 DIAGNOSIS — Z23 NEED FOR SHINGLES VACCINE: ICD-10-CM

## 2023-04-21 DIAGNOSIS — I70.0 ATHEROSCLEROSIS OF AORTA: ICD-10-CM

## 2023-04-21 DIAGNOSIS — E79.0 HYPERURICEMIA: ICD-10-CM

## 2023-04-21 DIAGNOSIS — E03.8 SUBCLINICAL HYPOTHYROIDISM: ICD-10-CM

## 2023-04-21 DIAGNOSIS — G89.29 CHRONIC LEFT SHOULDER PAIN: ICD-10-CM

## 2023-04-21 DIAGNOSIS — Z23 NEED FOR STREPTOCOCCUS PNEUMONIAE VACCINATION: ICD-10-CM

## 2023-04-21 DIAGNOSIS — H02.422 ACQUIRED MYOGENIC PTOSIS OF EYELID, LEFT: ICD-10-CM

## 2023-04-21 DIAGNOSIS — I48.0 PAROXYSMAL ATRIAL FIBRILLATION: ICD-10-CM

## 2023-04-21 PROCEDURE — 99999 PR PBB SHADOW E&M-EST. PATIENT-LVL V: CPT | Mod: PBBFAC,HCNC,, | Performed by: INTERNAL MEDICINE

## 2023-04-21 PROCEDURE — 1101F PR PT FALLS ASSESS DOC 0-1 FALLS W/OUT INJ PAST YR: ICD-10-PCS | Mod: HCNC,CPTII,S$GLB, | Performed by: INTERNAL MEDICINE

## 2023-04-21 PROCEDURE — G0009 PNEUMOCOCCAL POLYSACCHARIDE VACCINE 23-VALENT =>2YO SQ IM: ICD-10-PCS | Mod: HCNC,S$GLB,, | Performed by: INTERNAL MEDICINE

## 2023-04-21 PROCEDURE — 99999 PR PBB SHADOW E&M-EST. PATIENT-LVL V: ICD-10-PCS | Mod: PBBFAC,HCNC,, | Performed by: INTERNAL MEDICINE

## 2023-04-21 PROCEDURE — 3288F FALL RISK ASSESSMENT DOCD: CPT | Mod: HCNC,CPTII,S$GLB, | Performed by: INTERNAL MEDICINE

## 2023-04-21 PROCEDURE — G0009 ADMIN PNEUMOCOCCAL VACCINE: HCPCS | Mod: HCNC,S$GLB,, | Performed by: INTERNAL MEDICINE

## 2023-04-21 PROCEDURE — 1159F PR MEDICATION LIST DOCUMENTED IN MEDICAL RECORD: ICD-10-PCS | Mod: HCNC,CPTII,S$GLB, | Performed by: INTERNAL MEDICINE

## 2023-04-21 PROCEDURE — 1126F AMNT PAIN NOTED NONE PRSNT: CPT | Mod: HCNC,CPTII,S$GLB, | Performed by: INTERNAL MEDICINE

## 2023-04-21 PROCEDURE — 3078F DIAST BP <80 MM HG: CPT | Mod: HCNC,CPTII,S$GLB, | Performed by: INTERNAL MEDICINE

## 2023-04-21 PROCEDURE — 3074F PR MOST RECENT SYSTOLIC BLOOD PRESSURE < 130 MM HG: ICD-10-PCS | Mod: HCNC,CPTII,S$GLB, | Performed by: INTERNAL MEDICINE

## 2023-04-21 PROCEDURE — 99397 PR PREVENTIVE VISIT,EST,65 & OVER: ICD-10-PCS | Mod: HCNC,S$GLB,, | Performed by: INTERNAL MEDICINE

## 2023-04-21 PROCEDURE — 1101F PT FALLS ASSESS-DOCD LE1/YR: CPT | Mod: HCNC,CPTII,S$GLB, | Performed by: INTERNAL MEDICINE

## 2023-04-21 PROCEDURE — 90732 PNEUMOCOCCAL POLYSACCHARIDE VACCINE 23-VALENT =>2YO SQ IM: ICD-10-PCS | Mod: HCNC,S$GLB,, | Performed by: INTERNAL MEDICINE

## 2023-04-21 PROCEDURE — 1159F MED LIST DOCD IN RCRD: CPT | Mod: HCNC,CPTII,S$GLB, | Performed by: INTERNAL MEDICINE

## 2023-04-21 PROCEDURE — 3078F PR MOST RECENT DIASTOLIC BLOOD PRESSURE < 80 MM HG: ICD-10-PCS | Mod: HCNC,CPTII,S$GLB, | Performed by: INTERNAL MEDICINE

## 2023-04-21 PROCEDURE — 3288F PR FALLS RISK ASSESSMENT DOCUMENTED: ICD-10-PCS | Mod: HCNC,CPTII,S$GLB, | Performed by: INTERNAL MEDICINE

## 2023-04-21 PROCEDURE — 3074F SYST BP LT 130 MM HG: CPT | Mod: HCNC,CPTII,S$GLB, | Performed by: INTERNAL MEDICINE

## 2023-04-21 PROCEDURE — 90732 PPSV23 VACC 2 YRS+ SUBQ/IM: CPT | Mod: HCNC,S$GLB,, | Performed by: INTERNAL MEDICINE

## 2023-04-21 PROCEDURE — 1126F PR PAIN SEVERITY QUANTIFIED, NO PAIN PRESENT: ICD-10-PCS | Mod: HCNC,CPTII,S$GLB, | Performed by: INTERNAL MEDICINE

## 2023-04-21 PROCEDURE — 99397 PER PM REEVAL EST PAT 65+ YR: CPT | Mod: HCNC,S$GLB,, | Performed by: INTERNAL MEDICINE

## 2023-04-21 RX ORDER — ZOSTER VACCINE RECOMBINANT, ADJUVANTED 50 MCG/0.5
0.5 KIT INTRAMUSCULAR ONCE
Qty: 1 EACH | Refills: 0 | Status: SHIPPED | OUTPATIENT
Start: 2023-04-21 | End: 2023-04-21

## 2023-04-21 NOTE — PROGRESS NOTES
After obtaining consent, and per orders of Dr. Ye Dudley, injection of Pneumovax 23 given by Jennyfer Clark on the right deltoid. Patient instructed to remain in clinic for 15 minutes afterwards, and to report any adverse reaction to me immediately.

## 2023-04-21 NOTE — PROGRESS NOTES
Subjective:       Patient ID: Ezekiel Mueller is a 77 y.o. male.    Chief Complaint: Follow-up      HPI  Ezekiel Mueller is a 77 y.o. year old male with afib on OAC, HTN, fatty liver, hx of pancreatitis, prediabetes, hx of BCC of R eyelid, colon polyps presents for annual exam. Labwork done, reviewed. Last visit 4 weeks ago, X-rays done with evidence of previous healed fracture deformity at L AC joint lateral clavicle, superimposed posttraumatic DJD. Has orthopedic appointment scheduled. Has dermatology appointment scheduled as well.     Since last visit, also started on allopurinol 100 mg daily for hyperuricemia. Goal uric acid to be maintained <6.0.     Review of Systems   Constitutional:  Negative for activity change, appetite change, chills, fatigue, fever and unexpected weight change.   HENT:  Negative for congestion, rhinorrhea and sore throat.    Eyes:  Negative for visual disturbance.   Respiratory:  Negative for shortness of breath.    Cardiovascular:  Negative for chest pain.   Gastrointestinal:  Negative for abdominal pain, diarrhea, nausea and vomiting.   Genitourinary:  Negative for difficulty urinating and dysuria.   Musculoskeletal:  Positive for arthralgias. Negative for back pain and myalgias.   Skin:  Negative for color change and rash.   Neurological:  Negative for dizziness, weakness and headaches.       Past Medical History:   Diagnosis Date    Basal cell carcinoma 02/2015    R eyelid    Borderline diabetes 2016    Colon polyps     [facility], none on [facility]    Elevated blood uric acid level 2016    Fatty liver     Hypertension     diagnosed 2005    Pancreatitis 07/2015    etiology unclear        Prior to Admission medications    Medication Sig Start Date End Date Taking? Authorizing Provider   allopurinoL (ZYLOPRIM) 100 MG tablet Take 1 tablet (100 mg total) by mouth once daily. 3/31/23   Ye Dudley MD   amLODIPine (NORVASC) 10 MG tablet Take 1 tablet (10 mg total) by  mouth once daily. For blood pressure 3/29/23   Vega Ferrer MD   atorvastatin (LIPITOR) 40 MG tablet Take 1 tablet (40 mg total) by mouth once daily. 3/13/23 3/12/24  Vega Ferrer MD   blood sugar diagnostic Strp 1 strip by Misc.(Non-Drug; Combo Route) route once daily. 2/20/18   Joshua Leija MD   blood-glucose meter Misc 1 kit by Misc.(Non-Drug; Combo Route) route 3 (three) times daily. 2/20/18 9/20/21  Joshua Leija MD   cholecalciferol, vitamin D3, (VITAMIN D3) 50 mcg (2,000 unit) Cap Take 1 capsule by mouth once daily.    Historical Provider   diclofenac sodium (VOLTAREN) 1 % Gel Apply 2 g topically once daily. 7/15/22   KIRK Odom   erythromycin (ROMYCIN) ophthalmic ointment Place into the left eye every evening. For 5 days for infection in left eye 7/14/21   Tiffany Figueredo DNP   gabapentin (NEURONTIN) 100 MG capsule Take 1 capsule (100 mg total) by mouth every 8 (eight) hours as needed (Pain). May take up to 2 tabs every 8 hours if needed for pain 7/15/22 7/15/23  KIRK Odom   hydroCHLOROthiazide (HYDRODIURIL) 25 MG tablet Take 1 tablet (25 mg total) by mouth once daily. 3/29/23   Ye Dudley MD   lancing device (BD LANCET DEVICE) Misc 1 Units by Misc.(Non-Drug; Combo Route) route once daily. Please dispense lancets and strips - # 100 2/19/18 9/20/21  Joshua Leija MD   losartan (COZAAR) 25 MG tablet Take 0.5 tablets (12.5 mg total) by mouth once daily. 3/29/23   Ye Dudley MD   metFORMIN (GLUCOPHAGE) 500 MG tablet Take 1 tablet (500 mg total) by mouth 2 (two) times daily with meals. 1/12/23   Ye Dudley MD   multivitamin with minerals tablet Take 1 tablet by mouth once daily.    Historical Provider   mupirocin (BACTROBAN) 2 % ointment Apply topically 2 (two) times daily. 7/15/22   KIRK Odom   naproxen (NAPROSYN) 375 MG tablet Take 1 tablet (375 mg total) by mouth 2 (two) times daily with meals. 7/20/22   Van Nova Jr., MD   omega-3  "fatty acids/fish oil (FISH OIL-OMEGA-3 FATTY ACIDS) 300-1,000 mg capsule Take by mouth once daily.    Historical Provider   ondansetron (ZOFRAN) 4 MG tablet Take 1 tablet (4 mg total) by mouth every 6 (six) hours. 7/9/22   Halina Goldsmith MD   oxyCODONE-acetaminophen (PERCOCET) 5-325 mg per tablet Take 1 tablet by mouth every 4 (four) hours as needed for Pain.  Patient not taking: Reported on 10/21/2022 7/20/22   Van Nova Jr., MD   rivaroxaban (XARELTO) 20 mg Tab Take 1 tablet (20 mg total) by mouth Daily. 3/2/23   Vega Ferrer MD   sotaloL (BETAPACE) 80 MG tablet Take 1 tablet (80 mg total) by mouth 2 (two) times daily. First dose on Wednesday Feb 2 in AM 1/10/23 1/10/24  Fermín Trevino MD   TRUEPLUS LANCETS 33 gauge Misc USE AS DIRECTED THREE TIMES DAILY AS NEEDED 12/12/18   Joshua Leija MD   vits A,C,E/lutein/minerals (HEALTHY EYES ORAL) Take by mouth once daily.    Historical Provider        Past medical history, surgical history, and family medical history reviewed and updated as appropriate.    Medications and allergies reviewed.     Objective:          Vitals:    04/21/23 1432   BP: 122/78   BP Location: Right arm   Patient Position: Sitting   Pulse: 71   SpO2: 96%   Weight: 99.9 kg (220 lb 3.8 oz)   Height: 6' 1" (1.854 m)     Body mass index is 29.06 kg/m².  Physical Exam  Constitutional:       General: He is not in acute distress.     Appearance: He is well-developed.   HENT:      Head: Normocephalic and atraumatic.      Nose: Nose normal.   Eyes:      General: No scleral icterus.     Extraocular Movements: Extraocular movements intact.   Neck:      Thyroid: No thyromegaly.      Vascular: No JVD.      Trachea: No tracheal deviation.   Cardiovascular:      Rate and Rhythm: Normal rate and regular rhythm.      Heart sounds: Normal heart sounds. No murmur heard.    No friction rub. No gallop.   Pulmonary:      Effort: Pulmonary effort is normal. No respiratory distress.      Breath sounds: Normal " breath sounds. No wheezing or rales.   Abdominal:      General: Bowel sounds are normal. There is no distension.      Palpations: Abdomen is soft. There is no mass.      Tenderness: There is no abdominal tenderness.   Musculoskeletal:         General: Deformity present. No tenderness. Normal range of motion.      Cervical back: Normal range of motion and neck supple.   Lymphadenopathy:      Cervical: No cervical adenopathy.   Skin:     General: Skin is warm and dry.      Findings: No rash.   Neurological:      Mental Status: He is alert and oriented to person, place, and time.      Cranial Nerves: No cranial nerve deficit.      Deep Tendon Reflexes: Reflexes normal.   Psychiatric:         Behavior: Behavior normal.       Lab Results   Component Value Date    WBC 4.46 03/31/2023    HGB 13.9 (L) 03/31/2023    HCT 42.5 03/31/2023     03/31/2023    CHOL 125 03/31/2023    TRIG 112 03/31/2023    HDL 41 03/31/2023    ALT 30 03/31/2023    AST 24 03/31/2023     03/31/2023    K 3.6 03/31/2023     03/31/2023    CREATININE 1.0 03/31/2023    BUN 14 03/31/2023    CO2 32 (H) 03/31/2023    TSH 5.551 (H) 03/31/2023    PSA 0.80 04/23/2021    INR 1.3 (H) 07/09/2022    HGBA1C 6.1 (H) 03/31/2023       Assessment:       1. Encounter for annual physical exam    2. Essential hypertension    3. Paroxysmal atrial fibrillation    4. Hyperuricemia    5. Hyperlipidemia LDL goal <100    6. Impaired fasting blood sugar    7. Atherosclerosis of aorta    8. Chronic left shoulder pain    9. Subclinical hypothyroidism    10. Need for Streptococcus pneumoniae vaccination    11. Need for shingles vaccine    12. Acquired myogenic ptosis of eyelid, left          Plan:     Ezekiel was seen today for follow-up.    Diagnoses and all orders for this visit:    Encounter for annual physical exam    Essential hypertension  Comments:  controlled, no changes to current management.    Paroxysmal atrial fibrillation  Comments:  rate controlled,  follows with cardiology; on sotalol, on xarelto; no changes to current management    Hyperuricemia  Comments:  started on allopurinol 100 mg daily, will recheck with next set of labwork.     Hyperlipidemia LDL goal <100  Comments:  at goal on atorvastatin 40 mg daily    Impaired fasting blood sugar  Comments:  on metformin 500 mg bid; last a1c 6.1, monitor    Atherosclerosis of aorta    Chronic left shoulder pain  Comments:  history of L clarvicular fracture 1972 (trying out for [employer]). had repair done    Subclinical hypothyroidism  Comments:  TSH acceptable for age, no changes to current management. subclinical.    Need for Streptococcus pneumoniae vaccination  -     (In Office Administered) Pneumococcal Polysaccharide Vaccine (23 Valent) (SQ/IM)    Need for shingles vaccine  -     Discontinue: varicella-zoster gE-AS01B, PF, (SHINGRIX, PF,) 50 mcg/0.5 mL injection; Inject 0.5 mLs into the muscle once. for 1 dose  -     varicella-zoster gE-AS01B, PF, (SHINGRIX, PF,) 50 mcg/0.5 mL injection; Inject 0.5 mLs into the muscle once. for 1 dose    Acquired myogenic ptosis of eyelid, left  -     Ambulatory referral/consult to Ophthalmology; Future    Benign physical examination, no issues identified. Will obtain routine labwork and age appropriate health screenings.     Health maintenance reviewed with patient. Due for covid-19 booster (declines). Pneumonia shot today, shingrix prescription printed.     Follow up in about 6 months (around 10/21/2023).    Ye Dudley MD  Internal Medicine / Primary Care  [facility]  4/21/2023

## 2023-04-21 NOTE — PATIENT INSTRUCTIONS
"Pneumonia shot today (PPSV-23)  Prescription for shingles vaccine - "Shingrix", two shot series, 2-6 months apart.   Schedule ophthalmology appointment.  Return to clinic in 6 months or sooner if needed.  "

## 2023-04-24 ENCOUNTER — TELEPHONE (OUTPATIENT)
Dept: OPHTHALMOLOGY | Facility: CLINIC | Age: 78
End: 2023-04-24
Payer: MEDICARE

## 2023-04-24 NOTE — TELEPHONE ENCOUNTER
----- Message from Yudy Stuart MA sent at 4/24/2023 11:08 AM CDT -----  Regarding: FW: Scheduling    ----- Message -----  From: Mikaela Murphy  Sent: 4/24/2023  10:29 AM CDT  To: [facility] Clinical Support Staff  Subject: Scheduling                                       Please assist with scheduling appointment     Acquired myogenic ptosis of eyelid, left [H02.422]

## 2023-04-24 NOTE — TELEPHONE ENCOUNTER
----- Message from Mikaela Murphy sent at 4/24/2023 10:29 AM CDT -----  Regarding: Scheduling  Please assist with scheduling appointment     Acquired myogenic ptosis of eyelid, left [H02.422]

## 2023-05-15 ENCOUNTER — OFFICE VISIT (OUTPATIENT)
Dept: ORTHOPEDICS | Facility: CLINIC | Age: 78
End: 2023-05-15
Payer: MEDICARE

## 2023-05-15 VITALS — HEIGHT: 73 IN | WEIGHT: 220.25 LBS | BODY MASS INDEX: 29.19 KG/M2

## 2023-05-15 DIAGNOSIS — M75.52 SUBACROMIAL BURSITIS OF LEFT SHOULDER JOINT: Primary | ICD-10-CM

## 2023-05-15 DIAGNOSIS — M25.512 ACUTE PAIN OF LEFT SHOULDER: ICD-10-CM

## 2023-05-15 PROCEDURE — 1101F PT FALLS ASSESS-DOCD LE1/YR: CPT | Mod: HCNC,CPTII,S$GLB, | Performed by: PHYSICIAN ASSISTANT

## 2023-05-15 PROCEDURE — 99213 OFFICE O/P EST LOW 20 MIN: CPT | Mod: HCNC,25,S$GLB, | Performed by: PHYSICIAN ASSISTANT

## 2023-05-15 PROCEDURE — 1125F AMNT PAIN NOTED PAIN PRSNT: CPT | Mod: HCNC,CPTII,S$GLB, | Performed by: PHYSICIAN ASSISTANT

## 2023-05-15 PROCEDURE — 3288F FALL RISK ASSESSMENT DOCD: CPT | Mod: HCNC,CPTII,S$GLB, | Performed by: PHYSICIAN ASSISTANT

## 2023-05-15 PROCEDURE — 99999 PR PBB SHADOW E&M-EST. PATIENT-LVL IV: CPT | Mod: PBBFAC,HCNC,, | Performed by: PHYSICIAN ASSISTANT

## 2023-05-15 PROCEDURE — 20610 DRAIN/INJ JOINT/BURSA W/O US: CPT | Mod: HCNC,LT,S$GLB, | Performed by: PHYSICIAN ASSISTANT

## 2023-05-15 PROCEDURE — 1160F PR REVIEW ALL MEDS BY PRESCRIBER/CLIN PHARMACIST DOCUMENTED: ICD-10-PCS | Mod: HCNC,CPTII,S$GLB, | Performed by: PHYSICIAN ASSISTANT

## 2023-05-15 PROCEDURE — 3288F PR FALLS RISK ASSESSMENT DOCUMENTED: ICD-10-PCS | Mod: HCNC,CPTII,S$GLB, | Performed by: PHYSICIAN ASSISTANT

## 2023-05-15 PROCEDURE — 1159F PR MEDICATION LIST DOCUMENTED IN MEDICAL RECORD: ICD-10-PCS | Mod: HCNC,CPTII,S$GLB, | Performed by: PHYSICIAN ASSISTANT

## 2023-05-15 PROCEDURE — 99213 PR OFFICE/OUTPT VISIT, EST, LEVL III, 20-29 MIN: ICD-10-PCS | Mod: HCNC,25,S$GLB, | Performed by: PHYSICIAN ASSISTANT

## 2023-05-15 PROCEDURE — 20610 LARGE JOINT ASPIRATION/INJECTION: L SUBACROMIAL BURSA: ICD-10-PCS | Mod: HCNC,LT,S$GLB, | Performed by: PHYSICIAN ASSISTANT

## 2023-05-15 PROCEDURE — 1160F RVW MEDS BY RX/DR IN RCRD: CPT | Mod: HCNC,CPTII,S$GLB, | Performed by: PHYSICIAN ASSISTANT

## 2023-05-15 PROCEDURE — 1125F PR PAIN SEVERITY QUANTIFIED, PAIN PRESENT: ICD-10-PCS | Mod: HCNC,CPTII,S$GLB, | Performed by: PHYSICIAN ASSISTANT

## 2023-05-15 PROCEDURE — 1101F PR PT FALLS ASSESS DOC 0-1 FALLS W/OUT INJ PAST YR: ICD-10-PCS | Mod: HCNC,CPTII,S$GLB, | Performed by: PHYSICIAN ASSISTANT

## 2023-05-15 PROCEDURE — 99999 PR PBB SHADOW E&M-EST. PATIENT-LVL IV: ICD-10-PCS | Mod: PBBFAC,HCNC,, | Performed by: PHYSICIAN ASSISTANT

## 2023-05-15 PROCEDURE — 1159F MED LIST DOCD IN RCRD: CPT | Mod: HCNC,CPTII,S$GLB, | Performed by: PHYSICIAN ASSISTANT

## 2023-05-15 RX ADMIN — TRIAMCINOLONE ACETONIDE 40 MG: 40 INJECTION, SUSPENSION INTRA-ARTICULAR; INTRAMUSCULAR at 01:05

## 2023-05-15 NOTE — PROGRESS NOTES
SUBJECTIVE:     Chief Complaint & History of Present Illness:  Ezekiel Mueller is a 77 y.o. year old male who presents today with constant left shoulder pain that started about 6 months ago.  He is Right hand dominant. He does not recall a specific injury. The pain is located in the  upper arm aspect of the shoulder.  The pain is described as achy.  It is aggravated by lifting and reaching, overhead activity.  Previous treatments include rest and topical creams and tylenol which have provided minimal relief.  He did have a  shoulder in 1971 and prior surgery.     Review of patient's allergies indicates:  No Known Allergies      Current Outpatient Medications   Medication Sig Dispense Refill    allopurinoL (ZYLOPRIM) 100 MG tablet Take 1 tablet (100 mg total) by mouth once daily. 90 tablet 3    amLODIPine (NORVASC) 10 MG tablet Take 1 tablet (10 mg total) by mouth once daily. For blood pressure 90 tablet 3    atorvastatin (LIPITOR) 40 MG tablet Take 1 tablet (40 mg total) by mouth once daily. 90 tablet 3    blood sugar diagnostic Strp 1 strip by Misc.(Non-Drug; Combo Route) route once daily. 100 strip 1    cholecalciferol, vitamin D3, (VITAMIN D3) 50 mcg (2,000 unit) Cap Take 1 capsule by mouth once daily.      diclofenac sodium (VOLTAREN) 1 % Gel Apply 2 g topically once daily. 50 g 2    erythromycin (ROMYCIN) ophthalmic ointment Place into the left eye every evening. For 5 days for infection in left eye 3.5 g 0    hydroCHLOROthiazide (HYDRODIURIL) 25 MG tablet Take 1 tablet (25 mg total) by mouth once daily. 90 tablet 3    losartan (COZAAR) 25 MG tablet Take 0.5 tablets (12.5 mg total) by mouth once daily. 45 tablet 3    metFORMIN (GLUCOPHAGE) 500 MG tablet Take 1 tablet (500 mg total) by mouth 2 (two) times daily with meals. 180 tablet 3    multivitamin with minerals tablet Take 1 tablet by mouth once daily.      mupirocin (BACTROBAN) 2 % ointment Apply topically 2 (two) times daily. 30 g 1    naproxen  (NAPROSYN) 375 MG tablet Take 1 tablet (375 mg total) by mouth 2 (two) times daily with meals. 60 tablet 0    omega-3 fatty acids/fish oil (FISH OIL-OMEGA-3 FATTY ACIDS) 300-1,000 mg capsule Take by mouth once daily.      ondansetron (ZOFRAN) 4 MG tablet Take 1 tablet (4 mg total) by mouth every 6 (six) hours. 12 tablet 0    rivaroxaban (XARELTO) 20 mg Tab Take 1 tablet (20 mg total) by mouth Daily. 90 tablet 3    sotaloL (BETAPACE) 80 MG tablet Take 1 tablet (80 mg total) by mouth 2 (two) times daily. First dose on Wednesday Feb 2 in AM 60 tablet 11    TRUEPLUS LANCETS 33 gauge Misc USE AS DIRECTED THREE TIMES DAILY AS NEEDED 100 each 11    vits A,C,E/lutein/minerals (HEALTHY EYES ORAL) Take by mouth once daily.      blood-glucose meter Misc 1 kit by Misc.(Non-Drug; Combo Route) route 3 (three) times daily. 1 each 0    lancing device (BD LANCET DEVICE) Misc 1 Units by Misc.(Non-Drug; Combo Route) route once daily. Please dispense lancets and strips - # 100 1 each 0     No current facility-administered medications for this visit.       Past Medical History:   Diagnosis Date    Basal cell carcinoma 02/2015    R eyelid    Borderline diabetes 2016    Colon polyps     [facility], none on [facility]    Elevated blood uric acid level 2016    Fatty liver     Hypertension     diagnosed 2005    Pancreatitis 07/2015    etiology unclear       Past Surgical History:   Procedure Laterality Date    CHOLECYSTECTOMY  2012    COLONOSCOPY      x2    COLONOSCOPY N/A 7/30/2021    Procedure: COLONOSCOPY;  Surgeon: Hector Berry MD;  Location: [address];  Service: Endoscopy;  Laterality: N/A;  fully vaccinated-see immunization record    SKIN CANCER EXCISION         Review of Systems:  ROS:  Constitutional: no fever or chills  Eyes: no visual changes  ENT: no nasal congestion or sore throat  Respiratory: no cough or shortness of breath  Musculoskeletal: no arthralgias or myalgias      OBJECTIVE:      PHYSICAL EXAM:    General: Weight: 99.9 kg (220 lb 3.8 oz) Body mass index is 29.06 kg/m².  Patient is alert, awake and oriented to time, place and person. Mood and affect are appropriate.  Patient does not appear to be in any distress, denies any constitutional symptoms and appears stated age.   HEENT: Pupils are equal and round, sclera are not injected. External examination of ears and nose reveals no abnormalities. Cranial nerves II-X are grossly intact  Neck: examination demonstrates painless active range of motion. Spurling's sign is negative  Skin: no rashes, abrasions or open wounds on the affected extremity   Resp: No respiratory distress or audible wheezing   Psych:  normal mood and behavior  CV: 2+  pulses, all extremities warm and well perfused   Left Shoulder   Skin intact  No warmth or swelling  Tenderness: none  Range of motion is painful   ROM: forward flexion 160, external rotation 50/50, internal rotation L1  Shoulder Strength: biceps 5/5, triceps 5/5, abduction 5/5, adduction 5/5  positive for impingement sign, sensory exam normal, and motor exam normal  Stability tests: normal  Special Tests:    Crossbody test: negative    Neer's positive  Hawkin's positive    Edel's positive  Drop arm negative    IMAGING:  X-rays of the left shoulder, personally reviewed by me, demonstrate mild degenerative changes.  No fracture or dislocation.     ASSESSMENT     1. Subacromial bursitis of left shoulder joint    2. Acute pain of left shoulder      PLAN:     Discussed with the patient at length all the different treatment options available for his left shoulder including anti-inflammatories, acetaminophen, rest, ice, Physical therapy, occasional cortisone injections for temporary relief    - He elected to proceed with left shoulder CSI  - Rest, ice as needed  - HEP  - PT- already had referral, recommend he proceed with scheduling this  - Follow up if symptoms worsen or fail to improve.  Consider MRI if no  improvement with conservative treatment

## 2023-05-16 RX ORDER — TRIAMCINOLONE ACETONIDE 40 MG/ML
40 INJECTION, SUSPENSION INTRA-ARTICULAR; INTRAMUSCULAR
Status: DISCONTINUED | OUTPATIENT
Start: 2023-05-15 | End: 2023-05-16 | Stop reason: HOSPADM

## 2023-05-16 NOTE — PROCEDURES
Large Joint Aspiration/Injection: L subacromial bursa    Date/Time: 5/15/2023 1:00 PM  Performed by: Earnestine Herbert PA-C  Authorized by: Earnestine Herbert PA-C     Consent Done?:  Yes (Verbal)  Indications:  Pain  Prep: patient was prepped and draped in usual sterile fashion    Local anesthetic:  Topical anesthetic    Details:  Needle Size:  22 G  Approach:  Posterior  Location:  Shoulder  Site:  L subacromial bursa  Medications:  40 mg triamcinolone acetonide 40 mg/mL  Patient tolerance:  Patient tolerated the procedure well with no immediate complications

## 2023-05-25 ENCOUNTER — PES CALL (OUTPATIENT)
Dept: ADMINISTRATIVE | Facility: CLINIC | Age: 78
End: 2023-05-25
Payer: MEDICARE

## 2023-05-26 ENCOUNTER — OFFICE VISIT (OUTPATIENT)
Dept: DERMATOLOGY | Facility: CLINIC | Age: 78
End: 2023-05-26
Payer: MEDICARE

## 2023-05-26 VITALS — BODY MASS INDEX: 29.03 KG/M2 | WEIGHT: 220 LBS

## 2023-05-26 DIAGNOSIS — T14.8XXA ABRASION: ICD-10-CM

## 2023-05-26 DIAGNOSIS — L81.4 LENTIGINES: ICD-10-CM

## 2023-05-26 DIAGNOSIS — Z12.83 SKIN EXAM, SCREENING FOR CANCER: ICD-10-CM

## 2023-05-26 DIAGNOSIS — D22.9 NEVUS: ICD-10-CM

## 2023-05-26 DIAGNOSIS — Z85.828 HISTORY OF SKIN CANCER: ICD-10-CM

## 2023-05-26 DIAGNOSIS — L82.1 SEBORRHEIC KERATOSES: ICD-10-CM

## 2023-05-26 DIAGNOSIS — T14.8XXA ABRASION: Primary | ICD-10-CM

## 2023-05-26 PROCEDURE — 99214 PR OFFICE/OUTPT VISIT, EST, LEVL IV, 30-39 MIN: ICD-10-PCS | Mod: HCNC,S$GLB,, | Performed by: DERMATOLOGY

## 2023-05-26 PROCEDURE — 99999 PR PBB SHADOW E&M-EST. PATIENT-LVL IV: CPT | Mod: PBBFAC,HCNC,, | Performed by: DERMATOLOGY

## 2023-05-26 PROCEDURE — 1160F PR REVIEW ALL MEDS BY PRESCRIBER/CLIN PHARMACIST DOCUMENTED: ICD-10-PCS | Mod: HCNC,CPTII,S$GLB, | Performed by: DERMATOLOGY

## 2023-05-26 PROCEDURE — 1126F AMNT PAIN NOTED NONE PRSNT: CPT | Mod: HCNC,CPTII,S$GLB, | Performed by: DERMATOLOGY

## 2023-05-26 PROCEDURE — 3288F FALL RISK ASSESSMENT DOCD: CPT | Mod: HCNC,CPTII,S$GLB, | Performed by: DERMATOLOGY

## 2023-05-26 PROCEDURE — 1160F RVW MEDS BY RX/DR IN RCRD: CPT | Mod: HCNC,CPTII,S$GLB, | Performed by: DERMATOLOGY

## 2023-05-26 PROCEDURE — 1101F PT FALLS ASSESS-DOCD LE1/YR: CPT | Mod: HCNC,CPTII,S$GLB, | Performed by: DERMATOLOGY

## 2023-05-26 PROCEDURE — 1159F MED LIST DOCD IN RCRD: CPT | Mod: HCNC,CPTII,S$GLB, | Performed by: DERMATOLOGY

## 2023-05-26 PROCEDURE — 3288F PR FALLS RISK ASSESSMENT DOCUMENTED: ICD-10-PCS | Mod: HCNC,CPTII,S$GLB, | Performed by: DERMATOLOGY

## 2023-05-26 PROCEDURE — 99214 OFFICE O/P EST MOD 30 MIN: CPT | Mod: HCNC,S$GLB,, | Performed by: DERMATOLOGY

## 2023-05-26 PROCEDURE — 99999 PR PBB SHADOW E&M-EST. PATIENT-LVL IV: ICD-10-PCS | Mod: PBBFAC,HCNC,, | Performed by: DERMATOLOGY

## 2023-05-26 PROCEDURE — 1126F PR PAIN SEVERITY QUANTIFIED, NO PAIN PRESENT: ICD-10-PCS | Mod: HCNC,CPTII,S$GLB, | Performed by: DERMATOLOGY

## 2023-05-26 PROCEDURE — 1101F PR PT FALLS ASSESS DOC 0-1 FALLS W/OUT INJ PAST YR: ICD-10-PCS | Mod: HCNC,CPTII,S$GLB, | Performed by: DERMATOLOGY

## 2023-05-26 PROCEDURE — 1159F PR MEDICATION LIST DOCUMENTED IN MEDICAL RECORD: ICD-10-PCS | Mod: HCNC,CPTII,S$GLB, | Performed by: DERMATOLOGY

## 2023-05-26 RX ORDER — MUPIROCIN 20 MG/G
OINTMENT TOPICAL
Qty: 30 G | Refills: 3 | Status: SHIPPED | OUTPATIENT
Start: 2023-05-26 | End: 2023-05-26 | Stop reason: SDUPTHER

## 2023-05-26 RX ORDER — MUPIROCIN 20 MG/G
OINTMENT TOPICAL
Qty: 30 G | Refills: 3 | Status: SHIPPED | OUTPATIENT
Start: 2023-05-26 | End: 2023-10-12 | Stop reason: SDUPTHER

## 2023-05-26 NOTE — PROGRESS NOTES
Subjective:      Patient ID:  Ezekiel Mueller is a 77 y.o. male who presents for   Chief Complaint   Patient presents with    Skin Check    Lesion     Right arm     Would like skin check.  Has abrasion on right arm from dog scratches (recurrent over a month).     Lesion - Initial  Affected locations: face, scalp, left arm, right arm, chest, torso, back and abdomen  Signs / symptoms: asymptomatic    Review of Systems   Constitutional:  Negative for fever, chills, weight loss, weight gain, fatigue, night sweats and malaise.   Skin:  Negative for daily sunscreen use, activity-related sunscreen use and wears hat.   Hematologic/Lymphatic: Does not bruise/bleed easily.     Objective:   Physical Exam   Constitutional: He appears well-developed and well-nourished. No distress.   Neurological: He is alert and oriented to person, place, and time. He is not disoriented.   Psychiatric: He has a normal mood and affect.   Skin:   Areas Examined (abnormalities noted in diagram):   Head / Face Inspection Performed  Neck Inspection Performed  Chest / Axilla Inspection Performed  Back Inspection Performed  RUE Inspected  LUE Inspection Performed               Diagram Legend     Erythematous scaling macule/papule c/w actinic keratosis       Vascular papule c/w angioma      Pigmented verrucoid papule/plaque c/w seborrheic keratosis      Yellow umbilicated papule c/w sebaceous hyperplasia      Irregularly shaped tan macule c/w lentigo     1-2 mm smooth white papules consistent with Milia      Movable subcutaneous cyst with punctum c/w epidermal inclusion cyst      Subcutaneous movable cyst c/w pilar cyst      Firm pink to brown papule c/w dermatofibroma      Pedunculated fleshy papule(s) c/w skin tag(s)      Evenly pigmented macule c/w junctional nevus     Mildly variegated pigmented, slightly irregular-bordered macule c/w mildly atypical nevus      Flesh colored to evenly pigmented papule c/w intradermal nevus       Pink pearly  "papule/plaque c/w basal cell carcinoma      Erythematous hyperkeratotic cursted plaque c/w SCC      Surgical scar with no sign of skin cancer recurrence      Open and closed comedones      Inflammatory papules and pustules      Verrucoid papule consistent consistent with wart     Erythematous eczematous patches and plaques     Dystrophic onycholytic nail with subungual debris c/w onychomycosis     Umbilicated papule    Erythematous-base heme-crusted tan verrucoid plaque consistent with inflamed seborrheic keratosis     Erythematous Silvery Scaling Plaque c/w Psoriasis     See annotation      Assessment / Plan:        Abrasion right arm  -     mupirocin (BACTROBAN) 2 % ointment; Use qd  Dispense: 30 g; Refill: 3  Use with band aid, change daily  Call if not resolved     Skin exam, screening for cancer  History of skin cancer bcc right eyelid  -     Ambulatory referral/consult to Dermatology  Area(s) of previous NMSC evaluated with no signs of recurrence.    Upper body skin examination performed today including at least 6 points as noted in physical examination. No lesions suspicious for malignancy noted.    Recommend daily sun protection/avoidance and use of at least SPF 30, broad spectrum sunscreen (OTC drug).       Seborrheic keratoses  Seborrheic keratosis scattered, told benign no treatment needed.  Brochure provided.        Nevus  The "ABCD" rules to observe pigmented lesions were reviewed.      Lentigines  The "ABCD" rules to observe pigmented lesions were reviewed.               Follow up in about 6 months (around 11/26/2023).  "

## 2023-05-30 ENCOUNTER — CLINICAL SUPPORT (OUTPATIENT)
Dept: REHABILITATION | Facility: HOSPITAL | Age: 78
End: 2023-05-30
Payer: MEDICARE

## 2023-05-30 DIAGNOSIS — M25.512 CHRONIC LEFT SHOULDER PAIN: ICD-10-CM

## 2023-05-30 DIAGNOSIS — R52 PAIN AGGRAVATED BY LIFTING: ICD-10-CM

## 2023-05-30 DIAGNOSIS — G89.29 CHRONIC LEFT SHOULDER PAIN: ICD-10-CM

## 2023-05-30 DIAGNOSIS — R29.898 DECREASED STRENGTH OF UPPER EXTREMITY: ICD-10-CM

## 2023-05-30 DIAGNOSIS — M25.612 DECREASED ROM OF LEFT SHOULDER: ICD-10-CM

## 2023-05-30 PROCEDURE — 97112 NEUROMUSCULAR REEDUCATION: CPT

## 2023-05-30 PROCEDURE — 97162 PT EVAL MOD COMPLEX 30 MIN: CPT

## 2023-05-30 NOTE — PLAN OF CARE
[facility]   Physical Therapy Initial Evaluation      Name: Ezekiel Mueller  Clinic Number: [id number]    Therapy Diagnosis:   Encounter Diagnoses   Name Primary?    Chronic left shoulder pain     Decreased ROM of left shoulder     Decreased strength of upper extremity     Pain aggravated by lifting         Physician: Ye Dudley MD    Physician Orders: PT Eval and Treat left shoulder  Medical Diagnosis from Referral: M25.512,G89.29 (ICD-10-CM) - Chronic left shoulder pain  Evaluation Date: 5/30/2023  Authorization Period Expiration: 3/28/2024  Plan of Care Expiration: 8/4/2023  Progress Note Due: 10th visit  Visit # / Visits authorized: 1/ 1   FOTO: 1/3    Precautions: Standard     Time In: 7:01 AM  Time Out: 7:56 AM  Total Appointment Time (timed & untimed codes): 55 minutes    Subjective     Date of onset: Over 6 months ago     History of current condition - Ezekiel reports to the clinic with complaints of right shoulder/arm pain that began about 6 months ago and without mechanism of injury. He reports the pain came out of no where and had progressively gotten worse. Patient went to MD where he was given an injection which he states provided some relief but pain is still present. He reports his pain in more into his arm between the shoulder and the elbow. His greatest limiting factor is reaching and lifting tasks when placing his arm in front of him and to the side.    Falls: One fall from a ladder - fractured two ribs.     Imaging: See imaging section.     Prior Therapy: None   Social History: lives with their spouse  Occupation: Working - manager for [employer]   Prior Level of Function: Patient was independent in all overhead activity performance, lifting, dressing, and self care.   Current Level of Function: Patient is moderately impaired in overhead activity performance, lifting, and reaching secondary to decreased left shoulder range of motion, gross upper extremity  strength, rotator cuff and periscapular motor control, and increased pain levels.     Pain:  Current 0/10, worst 9/10, best 0/10   Location: left shoulder and upper arm    Description: Sharp and Shooting  Aggravating Factors: Lifting and Reaching  Easing Factors: heating pad, injection, and nothing    Patients goals: Stop the pain with reaching and lifting     Medical History:   Past Medical History:   Diagnosis Date    Basal cell carcinoma 02/2015    R eyelid    Borderline diabetes 2016    Colon polyps     [facility], none on repeat    Elevated blood uric acid level 2016    Fatty liver     Hypertension     diagnosed 2005    Pancreatitis 07/2015    etiology unclear     Surgical History:   Ezekiel Muellre  has a past surgical history that includes Skin cancer excision; Cholecystectomy (2012); Colonoscopy; and Colonoscopy (N/A, 7/30/2021).    Medications:   Ezekiel has a current medication list which includes the following prescription(s): allopurinol, amlodipine, atorvastatin, blood sugar diagnostic, blood-glucose meter, cholecalciferol (vitamin d3), diclofenac sodium, erythromycin, hydrochlorothiazide, lancing device, losartan, metformin, multivitamin with minerals, mupirocin, naproxen, fish oil-omega-3 fatty acids, ondansetron, rivaroxaban, sotalol, trueplus lancets, and vits a,c,e/lutein/minerals.    Allergies:   Review of patient's allergies indicates:  No Known Allergies     Objective      Posture: forward head/rounded shoulders    Sensation: dermatomes intact    Palpation: TTP near bicep tendon and supraspinatus insertion    Mobility: GH joint - hypomobile in all planes but greatest limitation noted with inferior and posterior glides    Shoulder Range of Motion: * Denotes Pain   R Active L Active    Flexion 165  115*   Abduction 150 105*   ER 35 25*   IR 50 50     Functional assessment:  ER: Equal bilaterally (C6)  IR: Equal bilaterally (T12)    Upper Extremity Strength:    Right  Left    Shoulder FLEX 3-/5 Shoulder FLEX 3-/5   Shoulder ABD 3-/5 Shoulder ABD 3-/5   Shoulder ER 3+/5 Shoulder ER 3+/5   Shoulder IR 3+/5 Shoulder IR 3+/5   Elbow FLEX 4/5 Elbow Flex 4/5   Elbow EXT 4/5 Elbow EXT 4/5     Special Tests:  Painful Arc: Pos  Empty can: Pos  Neer's: Unable to get into testing position  Hawkin's José Luis: Pos     Limitation/Restriction for FOTO Shoulder Survey    Therapist reviewed FOTO scores for Ezekiel Mueller on 5/30/2023.   FOTO documents entered into EPIC - see Media section.    Limitation Score: 48%         Treatment     Total Treatment time (time-based codes) separate from Evaluation: 23 minutes     Ezekiel received the treatments listed below:      therapeutic exercises to develop strength, endurance, ROM, flexibility, posture, and core stabilization for 00 minutes including:     manual therapy techniques: Joint mobilizations and Manual traction were applied to the: left glenohumeral joint for 00 minutes, including:     neuromuscular re-education activities to improve: Balance, Coordination, Kinesthetic, Sense, Proprioception, Posture, and Motor Control for 23 minutes, including:   Education on plan of care, prognosis, activity modification, and home exercise program  Scapular squeeze 2 x 10 with 5 sec hold (maximal cueing to perform)  Supine shoulder flexion active assisted range of motion with 10 sec hold 4'    therapeutic activities to improve functional performance for 00 minutes, including:     Patient Education and Home Exercises     Education provided:   - Activity modification  - Plan of care  - Home exercise program  - Prognosis  - Functional anatomy    Written Home Exercises Provided: yes. Exercises were reviewed and Ezekiel was able to demonstrate them prior to the end of the session.  Ezekiel demonstrated good  understanding of the education provided. See EMR under Patient Instructions for exercises provided during therapy sessions.    Assessment     Ezekiel is a 77 y.o. male  referred to outpatient Physical Therapy with a medical diagnosis of M25.512,G89.29 (ICD-10-CM) - Chronic left shoulder pain. Patient presents with decreased left shoulder range of motion, gross upper extremity strength, periscapular and rotator cuff motor control, and increased pain. Patient is mainly limited with functional reaching and lifting tasks which limits his daily and work activities. Upon evaluation, he presents with hypomobility in all glenohumeral joint planes, painful arc, and greatest difficulty with eccentric motor control with flexion and abduction positions likely indicating possible rotator cuff pathology/impingement. Patient prognosis is Fair. Patient will benefit from skilled outpatient Physical Therapy to address the deficits stated above and in the chart below, provide patient /family education, and to maximize patientt's level of independence.     Plan of care discussed with patient: Yes  Patient's spiritual, cultural and educational needs considered and patient is agreeable to the plan of care and goals as stated below:     Anticipated Barriers for therapy: Therapy frequency    Medical Necessity is demonstrated by the following  History  Co-morbidities and personal factors that may impact the plan of care [] LOW: no personal factors / co-morbidities  [] MODERATE: 1-2 personal factors / co-morbidities  [x] HIGH: 3+ personal factors / co-morbidities    Moderate / High Support Documentation:   Co-morbidities affecting plan of care: See past medical history    Personal Factors:   age  lifestyle     Examination  Body Structures and Functions, activity limitations and participation restrictions that may impact the plan of care [] LOW: addressing 1-2 elements  [] MODERATE: 3+ elements  [x] HIGH: 4+ elements (please support below)    Moderate / High Support Documentation: range of motion, strength, motor control, lifestyle, reaching/carrying/lifting tasks     Clinical Presentation [] LOW:  stable  [x] MODERATE: Evolving  [] HIGH: Unstable     Decision Making/ Complexity Score: moderate       Goals: Short Term Goals: 4 weeks   1. Patient will reduce maximal pain rating to < 3/10 pain to facilitate ability to sleep through the night and recover from PT interventions.   2. Patient will demonstrate > 130 degrees flexion to facilitate ability to perform overhead activities.    3. Patient will demonstrate > 60 degrees external rotation to facilitate grooming tasks without restrictions.     Long Term Goals: 8-10 weeks   1. Patient will demonstrate > 160 degrees flexion to facilitate ability to perform overhead activities.    2. Patient will demonstrate >/= 4/5 upper quarter and periscapular strength to facilitate pain free lifting tasks.   3. Patient will improve FOTO limitation score to at least 31% indicating a clinically significant change in function.  4. Patient will be able to reach/lift at least 5# overhead 10x with < 3/10 pain to improve overhead lifting/reaching tasks.     Plan     Plan of care Certification: 5/30/2023 to 8/4/2023.    Outpatient Physical Therapy 2 times weekly for 8-10 weeks to include the following interventions: Manual Therapy, Moist Heat/ Ice, Neuromuscular Re-ed, Patient Education, Self Care, Therapeutic Activities, and Therapeutic Exercise.     Funmilayo Barrera, PT, DPT

## 2023-06-05 ENCOUNTER — CLINICAL SUPPORT (OUTPATIENT)
Dept: REHABILITATION | Facility: HOSPITAL | Age: 78
End: 2023-06-05
Payer: MEDICARE

## 2023-06-05 DIAGNOSIS — R52 PAIN AGGRAVATED BY LIFTING: ICD-10-CM

## 2023-06-05 DIAGNOSIS — R29.898 DECREASED STRENGTH OF UPPER EXTREMITY: ICD-10-CM

## 2023-06-05 DIAGNOSIS — M25.612 DECREASED ROM OF LEFT SHOULDER: Primary | ICD-10-CM

## 2023-06-05 PROCEDURE — 97112 NEUROMUSCULAR REEDUCATION: CPT

## 2023-06-05 NOTE — PROGRESS NOTES
"[facility]   Physical Therapy Treatment Note      Name: Ezekiel Hernandezoit  Clinic Number: [id number]    Therapy Diagnosis:   Encounter Diagnoses   Name Primary?    Decreased ROM of left shoulder Yes    Decreased strength of upper extremity     Pain aggravated by lifting      Physician: Ye Dudley MD    Visit Date: 6/5/2023    Physician Orders: PT Eval and Treat left shoulder  Medical Diagnosis from Referral: M25.512,G89.29 (ICD-10-CM) - Chronic left shoulder pain  Evaluation Date: 5/30/2023  Authorization Period Expiration: 12/31/2023  Plan of Care Expiration: 8/4/2023  Progress Note Due: 10th visit  Visit # / Visits authorized: 1/20 + eval  FOTO: 1/3     Precautions: Standard     PTA Visit #: 0/5     Time In: 10:01 AM  Time Out: 10:56 AM  Total Billable Time: 23 minutes    Subjective     Pt reports: Ezekiel reports that he is feeling much better since his initial evaluation as he is able to reach overhead and to the side without any pain. He states "I think the injection is helping and I am starting the feel the relief".   He was compliant with home exercise program.  Response to previous treatment: No adverse reactions  Functional change: Ongoing    Pain: 0/10  Location: left shoulder      Objective      Objective Measures updated at progress report unless specified.     Treatment     Ezekiel received the treatments listed below:      therapeutic exercises to develop strength, endurance, ROM, flexibility, posture, and core stabilization for 19 minutes including:   Supine shoulder flexion active assisted range of motion with 10 sec hold 4'  Supine shoulder external rotation active assisted range of motion 10 sec hold 4'  Pulley flexion/scaption/abduction 3' each direction    manual therapy techniques: Joint mobilizations and Manual traction were applied to the: left glenohumeral joint for 11 minutes, including:   Glenohumeral joint mobilizations inferior and posterior  Passive range of motion " in all planes    neuromuscular re-education activities to improve: Balance, Coordination, Kinesthetic, Sense, Proprioception, Posture, and Motor Control for 23 minutes, including:   Education on plan of care, prognosis, activity modification, and home exercise program  Scapular squeeze 3 x 10 with 5 sec hold Green TB (moderate cueing to perform)  Standing shoulder extension Green TB 3 x 10 with 5 sec hold  Standing internal rotation/external rotation walkouts Green TB 3 x 10 each (maximal cueing to perform)     therapeutic activities to improve functional performance for 00 minutes, including:     Patient Education and Home Exercises       Education provided:   - Activity modification  - Plan of care  - Home exercise program  - Prognosis  - Functional anatomy    Written Home Exercises Provided: Patient instructed to cont prior HEP. Exercises were reviewed and Ezekiel was able to demonstrate them prior to the end of the session.  Ezekiel demonstrated good  understanding of the education provided. See EMR under Patient Instructions for exercises provided during therapy sessions    Assessment     Ezekiel presents to the clinic for first follow up since initial evaluation with subjective improvement of shoulder symptoms. Despite reduction in pain, he still demonstrates significant reduction in active range of motion as compared to non-involved side; therefore patient in agreement to continue with physical therapy interventions to address these range of motion deficits and improve reaching and lifting tasks. He tolerated all initial exercises without any adverse reactions. Educated patient to continue with home exercise program and will continue to address range of motion and strength deficits at subsequent sessions.     Ezekiel Is progressing well towards his goals.   Pt prognosis is Good.     Pt will continue to benefit from skilled outpatient physical therapy to address the deficits listed in the problem list box on initial  evaluation, provide pt/family education and to maximize pt's level of independence in the home and community environment.     Pt's spiritual, cultural and educational needs considered and pt agreeable to plan of care and goals.     Anticipated Barriers for therapy: Therapy frequency    Goals: Short Term Goals: 4 weeks (Progressing, Not Met)  1. Patient will reduce maximal pain rating to < 3/10 pain to facilitate ability to sleep through the night and recover from PT interventions.   2. Patient will demonstrate > 130 degrees flexion to facilitate ability to perform overhead activities.    3. Patient will demonstrate > 60 degrees external rotation to facilitate grooming tasks without restrictions.      Long Term Goals: 8-10 weeks (Progressing, Not Met)  1. Patient will demonstrate > 160 degrees flexion to facilitate ability to perform overhead activities.    2. Patient will demonstrate >/= 4/5 upper quarter and periscapular strength to facilitate pain free lifting tasks.   3. Patient will improve FOTO limitation score to at least 31% indicating a clinically significant change in function.  4. Patient will be able to reach/lift at least 5# overhead 10x with < 3/10 pain to improve overhead lifting/reaching tasks.     Plan     Plan of care Certification: 5/30/2023 to 8/4/2023.     Outpatient Physical Therapy 2 times weekly for 8-10 weeks to include the following interventions: Manual Therapy, Moist Heat/ Ice, Neuromuscular Re-ed, Patient Education, Self Care, Therapeutic Activities, and Therapeutic Exercise.      Funmilayo Barrera, PT, DPT

## 2023-06-05 NOTE — PROGRESS NOTES
"[facility]   Physical Therapy Treatment Note     Name: Ezekiel Mueller  Clinic Number: [id number]    Therapy Diagnosis: No diagnosis found.  Physician: Ye Dudley MD    Visit Date: 6/5/2023    Physician Orders: PT Eval and Treat left shoulder  Medical Diagnosis from Referral: M25.512,G89.29 (ICD-10-CM) - Chronic left shoulder pain  Evaluation Date: 5/30/2023  Authorization Period Expiration: 3/28/2024  Plan of Care Expiration: 8/4/2023  Progress Note Due: 10th visit  Visit # / Visits authorized: 1 / 20 + eval   FOTO: 1/3    PTA Visit #: 1 / 5     Time In: ***  Time Out: ***  Total Treatment Time: *** minutes  Total Billable Time: *** minutes    Precautions: Standard    SUBJECTIVE     Patient reports: ***.  He {Actions; was/was not:30558} compliant with home exercise program.  Response to previous treatment: ***  Functional change: ***    Pain: {0-10:98745::"0"}/10, currently  Location: {RIGHT/LEFT/BILATERAL:34253} {LOCATION ON BODY:14101}   Patient goals: Stop the pain with reaching and lifting    OBJECTIVE     Objective Measures updated at progress report unless specified.     Treatment     Ezekiel received the treatments listed below:      therapeutic exercises to develop {AMB PT PROGRESS OBJECTIVE:67135} for *** minutes including:  ***    manual therapy techniques: {AMB PT PROGRESS MANUAL THERAPY:72324} were applied to the: *** for *** minutes, including:  ***    neuromuscular re-education activities to improve: {AMB PT PROGRESS NEURO RE-ED:37809} for *** minutes. The following activities were included:  Education on plan of care, prognosis, activity modification, and home exercise program  Scapular squeeze 2 x 10 with 5 sec hold (maximal cueing to perform)  Supine shoulder flexion active assisted range of motion with 10 sec hold 4'    therapeutic activities to improve functional performance for ***  minutes, including:  ***    gait training to improve functional mobility and safety for " "***  minutes, including:  ***    direct contact modalities after being cleared for contraindications: {AMB PT PROGRESS DIRECT CONTACT MODES:15092}    supervised modalities after being cleared for contradictions: {AMB PT SUPERVISED MODES:32339}    hot pack for *** minutes to ***.    cold pack for *** minutes to ***.    Patient Education and Home Exercises     Home Exercises Provided and Patient Education Provided     Education provided:   - ***    Written Home Exercises Provided: {Blank single:73844::"yes","Patient instructed to cont prior HEP"}. Exercises were reviewed and Ezeikel was able to demonstrate them prior to the end of the session.  Ezekiel demonstrated {Desc; good/fair/poor:36987} understanding of the education provided. See EMR under Patient Instructions for exercises provided during therapy sessions    ASSESSMENT     ***    Ezekiel Is progressing well towards his goals.   Pt prognosis is Fair.     Pt will continue to benefit from skilled outpatient physical therapy to address the deficits listed in the problem list box on initial evaluation, provide pt/family education and to maximize pt's level of independence in the home and community environment.     Pt's spiritual, cultural and educational needs considered and pt agreeable to plan of care and goals.     Anticipated barriers to physical therapy: Therapy frequency    Goals:   Short Term Goals: 4 weeks   1. Patient will reduce maximal pain rating to < 3/10 pain to facilitate ability to sleep through the night and recover from PT interventions.   2. Patient will demonstrate > 130 degrees flexion to facilitate ability to perform overhead activities.    3. Patient will demonstrate > 60 degrees external rotation to facilitate grooming tasks without restrictions.      Long Term Goals: 8-10 weeks   1. Patient will demonstrate > 160 degrees flexion to facilitate ability to perform overhead activities.    2. Patient will demonstrate >/= 4/5 upper quarter and " periscapular strength to facilitate pain free lifting tasks.   3. Patient will improve FOTO limitation score to at least 31% indicating a clinically significant change in function.  4. Patient will be able to reach/lift at least 5# overhead 10x with < 3/10 pain to improve overhead lifting/reaching tasks.     PLAN     Plan of Care Certification: 5/30/2023 to 8/4/2023.     Outpatient Physical Therapy 2 times weekly for 8-10 weeks to include the following interventions: Manual Therapy, Moist Heat/ Ice, Neuromuscular Re-ed, Patient Education, Self Care, Therapeutic Activities, and Therapeutic Exercise.     Johanne Stark, PTA

## 2023-06-13 ENCOUNTER — TELEPHONE (OUTPATIENT)
Dept: ELECTROPHYSIOLOGY | Facility: CLINIC | Age: 78
End: 2023-06-13
Payer: MEDICARE

## 2023-06-13 NOTE — TELEPHONE ENCOUNTER
Called pt in regards to message below. No answer, left a message with this information and call back #.

## 2023-06-13 NOTE — TELEPHONE ENCOUNTER
----- Message from Carmen Pitts RN sent at 6/13/2023 11:47 AM CDT -----  Regarding: event monitor  Patient has appt. With Dr. Trevino in August. Notes say to schedule event monitor prior to appt. I do not see where it has been scheduled. Order is in. Please schedule.

## 2023-06-14 ENCOUNTER — TELEPHONE (OUTPATIENT)
Dept: ELECTROPHYSIOLOGY | Facility: CLINIC | Age: 78
End: 2023-06-14
Payer: MEDICARE

## 2023-06-14 NOTE — TELEPHONE ENCOUNTER
Pt returned my call and scheduled Zio before appointment with Dr. Trevino. Pt verbally confirmed appointment date, time, and location, and thanked me for calling.

## 2023-06-16 ENCOUNTER — CLINICAL SUPPORT (OUTPATIENT)
Dept: CARDIOLOGY | Facility: HOSPITAL | Age: 78
End: 2023-06-16
Attending: INTERNAL MEDICINE
Payer: MEDICARE

## 2023-06-16 DIAGNOSIS — I48.0 PAROXYSMAL ATRIAL FIBRILLATION: ICD-10-CM

## 2023-06-16 DIAGNOSIS — I10 ESSENTIAL HYPERTENSION: ICD-10-CM

## 2023-06-16 PROCEDURE — 93248 CV CARDIAC MONITOR - 3-15 DAY ADULT (CUPID ONLY): ICD-10-PCS | Mod: ,,, | Performed by: INTERNAL MEDICINE

## 2023-06-16 PROCEDURE — 93248 EXT ECG>7D<15D REV&INTERPJ: CPT | Mod: ,,, | Performed by: INTERNAL MEDICINE

## 2023-06-20 ENCOUNTER — DOCUMENTATION ONLY (OUTPATIENT)
Dept: REHABILITATION | Facility: HOSPITAL | Age: 78
End: 2023-06-20
Payer: MEDICARE

## 2023-07-11 LAB
OHS CV EVENT MONITOR DAY: 13
OHS CV HOLTER HOOKUP DATE: NORMAL
OHS CV HOLTER HOOKUP TIME: NORMAL
OHS CV HOLTER LENGTH DECIMAL HOURS: 315
OHS CV HOLTER LENGTH HOURS: 3
OHS CV HOLTER LENGTH MINUTES: 0
OHS CV HOLTER SCAN DATE: NORMAL
OHS CV HOLTER SINUS AVERAGE HR: 62 BPM
OHS CV HOLTER SINUS MAX HR: 144 BPM
OHS CV HOLTER SINUS MIN HR: 48 BPM
OHS CV HOLTER STUDY END DATE: NORMAL
OHS CV HOLTER STUDY END TIME: NORMAL

## 2023-07-25 ENCOUNTER — PES CALL (OUTPATIENT)
Dept: ADMINISTRATIVE | Facility: CLINIC | Age: 78
End: 2023-07-25
Payer: MEDICARE

## 2023-08-22 ENCOUNTER — TELEPHONE (OUTPATIENT)
Dept: ELECTROPHYSIOLOGY | Facility: CLINIC | Age: 78
End: 2023-08-22
Payer: MEDICARE

## 2023-08-23 NOTE — PROGRESS NOTES
Subjective:     HPI    I had the pleasure of seeing Ezekiel Mueller in follow-up for his history of AF. He was last seen in 8/2022. 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 He was seen by cardiology several hours later, at which point he was back in sinus.    Echo in 9/2021 showed an EF of 60% and normal LA size. [facility]. A 48 hour Holter performed in 10/2021 showed sinus rhythm with an average HR of 74 bpm, no arrhythmias. A 2 week Zio monitor performed in 11/2021 showed an AF burden of 7%, with the longest episode lasting 15h 26m. Average HR in  bpm. He was asymptomatic while wearing it. He is on xarelto for stroke prophylaxis.    At his visit with me in 1/2022 sotalol 80 mg bid was started. At his 8/2022 visit, Mr. Mueller denied recurrent AF. He takes his BP regularly and states he has not had any indications of AF. No bleeding issues on xarelto.    A 2 week Zio in 6/2023 showed sinus rhythm average HR 62 bpm, no sustained arrhythmias.    Today in the office Mr. Mueller denies recurrent AF.    My interpretation of today's ECG is sinus rhythm at 62 bpm with RBBB and QTc of 456 ms.    Review of Systems   Constitutional: Negative for decreased appetite, malaise/fatigue, weight gain and weight loss.   HENT:  Negative for sore throat.    Eyes:  Negative for blurred vision.   Cardiovascular:  Negative for chest pain, dyspnea on exertion, irregular heartbeat, leg swelling, near-syncope, orthopnea, palpitations, paroxysmal nocturnal dyspnea and syncope.   Respiratory:  Negative for shortness of breath.    Skin:  Negative for rash.   Musculoskeletal:  Negative for arthritis.   Gastrointestinal:  Negative for abdominal pain.   Neurological:  Negative for focal weakness.   Psychiatric/Behavioral:  Negative for altered mental status.         Objective:    Physical  Exam  Vitals and nursing note reviewed.   Constitutional:       General: He is not in acute distress.     Appearance: He is well-developed.   HENT:      Head: Normocephalic and atraumatic.   Eyes:      General: No scleral icterus.     Pupils: Pupils are equal, round, and reactive to light.   Neck:      Thyroid: No thyromegaly.   Cardiovascular:      Rate and Rhythm: Regular rhythm.      Pulses: Normal pulses.      Heart sounds: Normal heart sounds. No murmur heard.     No friction rub. No gallop.   Pulmonary:      Effort: Pulmonary effort is normal.      Breath sounds: Normal breath sounds.   Abdominal:      General: Bowel sounds are normal. There is no distension.      Palpations: Abdomen is soft.      Tenderness: There is no abdominal tenderness.   Musculoskeletal:      Cervical back: Neck supple.   Skin:     General: Skin is warm and dry.      Findings: No rash.   Neurological:      Mental Status: He is alert and oriented to person, place, and time.   Psychiatric:         Behavior: Behavior normal.         Assessment:       1. Paroxysmal atrial fibrillation    2. Hyperlipidemia LDL goal <100    3. Essential hypertension         Plan:       In summary, Ezekiel Mueller is a 77 year old male with a history of symptomatic PAF. His [id number] Score is 3 (age, HTN) so he should continue xarelto. Financial burdens of this med are significant: instructed pt to contact insurance to see whether eliquis or pradaxa is less expensive and if so we can switch meds.    Mr. Mueller had a 7% AF burden based on Zio and prior to antiarrhythmic initiation. He is now on sotalol 80 mg bid, with no recurrent arrhythmias.    Plan to hold the course, follow-up 12 months.    Thank you for allowing me to participate in the care of this patient. Please do not hesitate to call me with any questions or concerns.

## 2023-08-25 ENCOUNTER — TELEPHONE (OUTPATIENT)
Dept: ELECTROPHYSIOLOGY | Facility: CLINIC | Age: 78
End: 2023-08-25
Payer: MEDICARE

## 2023-08-28 ENCOUNTER — HOSPITAL ENCOUNTER (OUTPATIENT)
Dept: CARDIOLOGY | Facility: CLINIC | Age: 78
Discharge: HOME | End: 2023-08-28
Payer: MEDICARE

## 2023-08-28 ENCOUNTER — OFFICE VISIT (OUTPATIENT)
Dept: ELECTROPHYSIOLOGY | Facility: CLINIC | Age: 78
End: 2023-08-28
Payer: MEDICARE

## 2023-08-28 VITALS
HEIGHT: 73 IN | HEART RATE: 62 BPM | WEIGHT: 220.69 LBS | DIASTOLIC BLOOD PRESSURE: 76 MMHG | SYSTOLIC BLOOD PRESSURE: 118 MMHG | BODY MASS INDEX: 29.25 KG/M2

## 2023-08-28 DIAGNOSIS — E78.5 HYPERLIPIDEMIA LDL GOAL <100: ICD-10-CM

## 2023-08-28 DIAGNOSIS — I48.0 PAROXYSMAL ATRIAL FIBRILLATION: Primary | ICD-10-CM

## 2023-08-28 DIAGNOSIS — I48.0 PAF (PAROXYSMAL ATRIAL FIBRILLATION): ICD-10-CM

## 2023-08-28 DIAGNOSIS — I10 ESSENTIAL HYPERTENSION: ICD-10-CM

## 2023-08-28 PROCEDURE — 3288F FALL RISK ASSESSMENT DOCD: CPT | Mod: HCNC,CPTII,S$GLB, | Performed by: INTERNAL MEDICINE

## 2023-08-28 PROCEDURE — 1101F PR PT FALLS ASSESS DOC 0-1 FALLS W/OUT INJ PAST YR: ICD-10-PCS | Mod: HCNC,CPTII,S$GLB, | Performed by: INTERNAL MEDICINE

## 2023-08-28 PROCEDURE — 3288F PR FALLS RISK ASSESSMENT DOCUMENTED: ICD-10-PCS | Mod: HCNC,CPTII,S$GLB, | Performed by: INTERNAL MEDICINE

## 2023-08-28 PROCEDURE — 3078F DIAST BP <80 MM HG: CPT | Mod: HCNC,CPTII,S$GLB, | Performed by: INTERNAL MEDICINE

## 2023-08-28 PROCEDURE — 99214 PR OFFICE/OUTPT VISIT, EST, LEVL IV, 30-39 MIN: ICD-10-PCS | Mod: HCNC,S$GLB,, | Performed by: INTERNAL MEDICINE

## 2023-08-28 PROCEDURE — 99214 OFFICE O/P EST MOD 30 MIN: CPT | Mod: HCNC,S$GLB,, | Performed by: INTERNAL MEDICINE

## 2023-08-28 PROCEDURE — 3074F PR MOST RECENT SYSTOLIC BLOOD PRESSURE < 130 MM HG: ICD-10-PCS | Mod: HCNC,CPTII,S$GLB, | Performed by: INTERNAL MEDICINE

## 2023-08-28 PROCEDURE — 1126F AMNT PAIN NOTED NONE PRSNT: CPT | Mod: HCNC,CPTII,S$GLB, | Performed by: INTERNAL MEDICINE

## 2023-08-28 PROCEDURE — 1159F MED LIST DOCD IN RCRD: CPT | Mod: HCNC,CPTII,S$GLB, | Performed by: INTERNAL MEDICINE

## 2023-08-28 PROCEDURE — 1101F PT FALLS ASSESS-DOCD LE1/YR: CPT | Mod: HCNC,CPTII,S$GLB, | Performed by: INTERNAL MEDICINE

## 2023-08-28 PROCEDURE — 99999 PR PBB SHADOW E&M-EST. PATIENT-LVL III: CPT | Mod: PBBFAC,HCNC,, | Performed by: INTERNAL MEDICINE

## 2023-08-28 PROCEDURE — 93005 ELECTROCARDIOGRAM TRACING: CPT | Mod: HCNC,S$GLB,, | Performed by: INTERNAL MEDICINE

## 2023-08-28 PROCEDURE — 99999 PR PBB SHADOW E&M-EST. PATIENT-LVL III: ICD-10-PCS | Mod: PBBFAC,HCNC,, | Performed by: INTERNAL MEDICINE

## 2023-08-28 PROCEDURE — 1126F PR PAIN SEVERITY QUANTIFIED, NO PAIN PRESENT: ICD-10-PCS | Mod: HCNC,CPTII,S$GLB, | Performed by: INTERNAL MEDICINE

## 2023-08-28 PROCEDURE — 93010 RHYTHM STRIP: ICD-10-PCS | Mod: HCNC,S$GLB,, | Performed by: INTERNAL MEDICINE

## 2023-08-28 PROCEDURE — 1159F PR MEDICATION LIST DOCUMENTED IN MEDICAL RECORD: ICD-10-PCS | Mod: HCNC,CPTII,S$GLB, | Performed by: INTERNAL MEDICINE

## 2023-08-28 PROCEDURE — 3078F PR MOST RECENT DIASTOLIC BLOOD PRESSURE < 80 MM HG: ICD-10-PCS | Mod: HCNC,CPTII,S$GLB, | Performed by: INTERNAL MEDICINE

## 2023-08-28 PROCEDURE — 3074F SYST BP LT 130 MM HG: CPT | Mod: HCNC,CPTII,S$GLB, | Performed by: INTERNAL MEDICINE

## 2023-08-28 PROCEDURE — 93005 RHYTHM STRIP: ICD-10-PCS | Mod: HCNC,S$GLB,, | Performed by: INTERNAL MEDICINE

## 2023-08-28 PROCEDURE — 93010 ELECTROCARDIOGRAM REPORT: CPT | Mod: HCNC,S$GLB,, | Performed by: INTERNAL MEDICINE

## 2023-09-11 ENCOUNTER — OFFICE VISIT (OUTPATIENT)
Dept: OPTOMETRY | Facility: CLINIC | Age: 78
End: 2023-09-11
Payer: MEDICARE

## 2023-09-11 DIAGNOSIS — E11.9 TYPE 2 DIABETES MELLITUS WITHOUT RETINOPATHY: Primary | ICD-10-CM

## 2023-09-11 DIAGNOSIS — H25.13 NUCLEAR SCLEROSIS, BILATERAL: ICD-10-CM

## 2023-09-11 DIAGNOSIS — Z13.5 GLAUCOMA SCREENING: ICD-10-CM

## 2023-09-11 DIAGNOSIS — H52.4 PRESBYOPIA: ICD-10-CM

## 2023-09-11 PROCEDURE — 1125F AMNT PAIN NOTED PAIN PRSNT: CPT | Mod: CPTII,S$GLB,, | Performed by: OPTOMETRIST

## 2023-09-11 PROCEDURE — 92015 PR REFRACTION: ICD-10-PCS | Mod: S$GLB,,, | Performed by: OPTOMETRIST

## 2023-09-11 PROCEDURE — 92015 DETERMINE REFRACTIVE STATE: CPT | Mod: S$GLB,,, | Performed by: OPTOMETRIST

## 2023-09-11 PROCEDURE — 99999 PR PBB SHADOW E&M-EST. PATIENT-LVL III: ICD-10-PCS | Mod: PBBFAC,,, | Performed by: OPTOMETRIST

## 2023-09-11 PROCEDURE — 99999 PR PBB SHADOW E&M-EST. PATIENT-LVL III: CPT | Mod: PBBFAC,,, | Performed by: OPTOMETRIST

## 2023-09-11 PROCEDURE — 1159F MED LIST DOCD IN RCRD: CPT | Mod: CPTII,S$GLB,, | Performed by: OPTOMETRIST

## 2023-09-11 PROCEDURE — 3288F FALL RISK ASSESSMENT DOCD: CPT | Mod: CPTII,S$GLB,, | Performed by: OPTOMETRIST

## 2023-09-11 PROCEDURE — 92014 COMPRE OPH EXAM EST PT 1/>: CPT | Mod: S$GLB,,, | Performed by: OPTOMETRIST

## 2023-09-11 PROCEDURE — 1101F PR PT FALLS ASSESS DOC 0-1 FALLS W/OUT INJ PAST YR: ICD-10-PCS | Mod: CPTII,S$GLB,, | Performed by: OPTOMETRIST

## 2023-09-11 PROCEDURE — 1101F PT FALLS ASSESS-DOCD LE1/YR: CPT | Mod: CPTII,S$GLB,, | Performed by: OPTOMETRIST

## 2023-09-11 PROCEDURE — 1160F RVW MEDS BY RX/DR IN RCRD: CPT | Mod: CPTII,S$GLB,, | Performed by: OPTOMETRIST

## 2023-09-11 PROCEDURE — 3288F PR FALLS RISK ASSESSMENT DOCUMENTED: ICD-10-PCS | Mod: CPTII,S$GLB,, | Performed by: OPTOMETRIST

## 2023-09-11 PROCEDURE — 1159F PR MEDICATION LIST DOCUMENTED IN MEDICAL RECORD: ICD-10-PCS | Mod: CPTII,S$GLB,, | Performed by: OPTOMETRIST

## 2023-09-11 PROCEDURE — 1160F PR REVIEW ALL MEDS BY PRESCRIBER/CLIN PHARMACIST DOCUMENTED: ICD-10-PCS | Mod: CPTII,S$GLB,, | Performed by: OPTOMETRIST

## 2023-09-11 PROCEDURE — 92014 PR EYE EXAM, EST PATIENT,COMPREHESV: ICD-10-PCS | Mod: S$GLB,,, | Performed by: OPTOMETRIST

## 2023-09-11 PROCEDURE — 1125F PR PAIN SEVERITY QUANTIFIED, PAIN PRESENT: ICD-10-PCS | Mod: CPTII,S$GLB,, | Performed by: OPTOMETRIST

## 2023-09-11 NOTE — PROGRESS NOTES
HPI    Patient is here today for an annual eye exam. Patient's last eye exam was   02/22/2022. Patient states he is having difficult reading small prints.   Patient is not experiencing any flashes or floaters in his vision. Patient   states he is feeling pain in OS. He rates it a 8/10. Patient states OS is   very dry and currently using Systane Ultra PF for relief. Patient states   he experiences glaring. Patient states he had eye lid surgery in OS in   1987.  Last edited by Nohelia Ignacio on 9/11/2023  9:09 AM.            Assessment /Plan     For exam results, see Encounter Report.    Type 2 diabetes mellitus without retinopathy    Nuclear sclerosis, bilateral    Glaucoma screening    Presbyopia        1. [facility]uced [facility] 2 to trauma [facility]: k scarring/carlie/lag/exposure K.  Advised Ats QID/sleep mask at night to protect eye  2. 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  Pt wishes surgery  3. DM- WITHOUT RETINOPATHY.      PLAN:    Surgical consult--Dr Lopez

## 2023-10-12 DIAGNOSIS — T14.8XXA ABRASION: ICD-10-CM

## 2023-10-12 DIAGNOSIS — I10 ESSENTIAL HYPERTENSION: ICD-10-CM

## 2023-10-12 DIAGNOSIS — I10 HTN, GOAL BELOW 140/90: ICD-10-CM

## 2023-10-12 RX ORDER — MUPIROCIN 20 MG/G
OINTMENT TOPICAL
Qty: 30 G | Refills: 3 | Status: SHIPPED | OUTPATIENT
Start: 2023-10-12

## 2023-10-12 RX ORDER — AMLODIPINE BESYLATE 10 MG/1
10 TABLET ORAL DAILY
Qty: 90 TABLET | Refills: 3 | OUTPATIENT
Start: 2023-10-12

## 2023-10-12 RX ORDER — HYDROCHLOROTHIAZIDE 25 MG/1
25 TABLET ORAL DAILY
Qty: 90 TABLET | Refills: 1 | Status: SHIPPED | OUTPATIENT
Start: 2023-10-12 | End: 2023-12-30 | Stop reason: SDUPTHER

## 2023-10-12 RX ORDER — SOTALOL HYDROCHLORIDE 80 MG/1
80 TABLET ORAL 2 TIMES DAILY
Qty: 60 TABLET | Refills: 11 | Status: SHIPPED | OUTPATIENT
Start: 2023-10-12 | End: 2024-10-11

## 2023-10-12 RX ORDER — LOSARTAN POTASSIUM 25 MG/1
12.5 TABLET ORAL DAILY
Qty: 45 TABLET | Refills: 1 | Status: SHIPPED | OUTPATIENT
Start: 2023-10-12 | End: 2023-12-30 | Stop reason: SDUPTHER

## 2023-10-12 NOTE — TELEPHONE ENCOUNTER
Care Due:                  Date            Visit Type   Department     Provider  --------------------------------------------------------------------------------                                EP -                              PRIMARY      [facility] INTERNAL  Last Visit: 04-      CARE ([facility])   KATHY Dudley                              [facility]                              PRIMARY      [facility] INTERNAL  Next Visit: 10-      CARE ([facility])   KATHY Dudley                                                            Last  Test          Frequency    Reason                     Performed    Due Date  --------------------------------------------------------------------------------    HBA1C.......  6 months...  metFORMIN................  03- 09-    [employer] Embedded Care Due Messages. Reference number: [id number].   10/12/2023 6:13:59 AM CDT

## 2023-10-12 NOTE — TELEPHONE ENCOUNTER
Provider Staff:  Action required for this patient     Please see care gap opportunities below in Care Due Message.    Thanks!  [facility]     Appointments      Date Provider   Last Visit   4/21/2023 Ye Dudley MD   Next Visit   10/20/2023 Ye Dudley MD     Refill Decision Note   Ezekiel Mueller  is requesting a refill authorization.  Brief Assessment and Rationale for Refill:  Approve     Medication Therapy Plan:         Pharmacist review requested: Yes   Extended chart review required: Yes   Comments:     Note composed:2:15 PM 10/12/2023             Appointments     Last Visit   4/21/2023 Ye Dudley MD   Next Visit   10/20/2023 Ye Dudley MD

## 2023-10-12 NOTE — TELEPHONE ENCOUNTER
Refill Routing Note   Medication(s) are not appropriate for processing by [facility] for the following reason(s):      Drug-disease interaction     [facility] action(s):  Approve  Defer Care Due:  Labs due            Pharmacist review requested: Yes     Appointments  past 12m or future 3m with PCP    Date Provider   Last Visit   4/21/2023 Ye Dudley MD   Next Visit   10/20/2023 Ye Dudley MD   ED visits in past 90 days: 0        Note composed:12:34 PM 10/12/2023

## 2023-10-20 ENCOUNTER — OFFICE VISIT (OUTPATIENT)
Dept: INTERNAL MEDICINE | Facility: CLINIC | Age: 78
End: 2023-10-20
Payer: MEDICARE

## 2023-10-20 ENCOUNTER — LAB VISIT (OUTPATIENT)
Dept: LAB | Facility: HOSPITAL | Age: 78
End: 2023-10-20
Attending: INTERNAL MEDICINE
Payer: MEDICARE

## 2023-10-20 VITALS
HEIGHT: 73 IN | SYSTOLIC BLOOD PRESSURE: 110 MMHG | WEIGHT: 215.63 LBS | BODY MASS INDEX: 28.58 KG/M2 | HEART RATE: 70 BPM | OXYGEN SATURATION: 96 % | DIASTOLIC BLOOD PRESSURE: 64 MMHG

## 2023-10-20 DIAGNOSIS — E11.65 TYPE 2 DIABETES MELLITUS WITH HYPERGLYCEMIA, WITHOUT LONG-TERM CURRENT USE OF INSULIN: ICD-10-CM

## 2023-10-20 DIAGNOSIS — I48.0 PAROXYSMAL ATRIAL FIBRILLATION: ICD-10-CM

## 2023-10-20 DIAGNOSIS — E03.8 SUBCLINICAL HYPOTHYROIDISM: ICD-10-CM

## 2023-10-20 DIAGNOSIS — I10 ESSENTIAL HYPERTENSION: ICD-10-CM

## 2023-10-20 DIAGNOSIS — L98.9 SKIN LESION: ICD-10-CM

## 2023-10-20 DIAGNOSIS — Z85.828 HISTORY OF BASAL CELL CARCINOMA: ICD-10-CM

## 2023-10-20 DIAGNOSIS — E79.0 HYPERURICEMIA: ICD-10-CM

## 2023-10-20 DIAGNOSIS — Z09 FOLLOW-UP EXAM: Primary | ICD-10-CM

## 2023-10-20 DIAGNOSIS — S41.101A NON-HEALING WOUND OF RIGHT UPPER EXTREMITY: ICD-10-CM

## 2023-10-20 DIAGNOSIS — E78.5 HYPERLIPIDEMIA LDL GOAL <100: ICD-10-CM

## 2023-10-20 LAB
ANION GAP SERPL CALC-SCNC: 12 MMOL/L (ref 8–16)
BUN SERPL-MCNC: 13 MG/DL (ref 8–23)
CALCIUM SERPL-MCNC: 10.2 MG/DL (ref 8.7–10.5)
CHLORIDE SERPL-SCNC: 101 MMOL/L (ref 95–110)
CO2 SERPL-SCNC: 28 MMOL/L (ref 23–29)
CREAT SERPL-MCNC: 1 MG/DL (ref 0.5–1.4)
EST. GFR  (NO RACE VARIABLE): >60 ML/MIN/1.73 M^2
ESTIMATED AVG GLUCOSE: 123 MG/DL (ref 68–131)
GLUCOSE SERPL-MCNC: 89 MG/DL (ref 70–110)
HBA1C MFR BLD: 5.9 % (ref 4–5.6)
POTASSIUM SERPL-SCNC: 3.9 MMOL/L (ref 3.5–5.1)
SODIUM SERPL-SCNC: 141 MMOL/L (ref 136–145)
TSH SERPL DL<=0.005 MIU/L-ACNC: 3.27 UIU/ML (ref 0.4–4)
URATE SERPL-MCNC: 7.9 MG/DL (ref 3.4–7)

## 2023-10-20 PROCEDURE — 1159F MED LIST DOCD IN RCRD: CPT | Mod: HCNC,CPTII,S$GLB, | Performed by: INTERNAL MEDICINE

## 2023-10-20 PROCEDURE — 99214 OFFICE O/P EST MOD 30 MIN: CPT | Mod: HCNC,S$GLB,, | Performed by: INTERNAL MEDICINE

## 2023-10-20 PROCEDURE — 3078F PR MOST RECENT DIASTOLIC BLOOD PRESSURE < 80 MM HG: ICD-10-PCS | Mod: HCNC,CPTII,S$GLB, | Performed by: INTERNAL MEDICINE

## 2023-10-20 PROCEDURE — 3288F PR FALLS RISK ASSESSMENT DOCUMENTED: ICD-10-PCS | Mod: HCNC,CPTII,S$GLB, | Performed by: INTERNAL MEDICINE

## 2023-10-20 PROCEDURE — 1126F AMNT PAIN NOTED NONE PRSNT: CPT | Mod: HCNC,CPTII,S$GLB, | Performed by: INTERNAL MEDICINE

## 2023-10-20 PROCEDURE — 99999 PR PBB SHADOW E&M-EST. PATIENT-LVL V: CPT | Mod: PBBFAC,HCNC,, | Performed by: INTERNAL MEDICINE

## 2023-10-20 PROCEDURE — 3074F PR MOST RECENT SYSTOLIC BLOOD PRESSURE < 130 MM HG: ICD-10-PCS | Mod: HCNC,CPTII,S$GLB, | Performed by: INTERNAL MEDICINE

## 2023-10-20 PROCEDURE — 99214 PR OFFICE/OUTPT VISIT, EST, LEVL IV, 30-39 MIN: ICD-10-PCS | Mod: HCNC,S$GLB,, | Performed by: INTERNAL MEDICINE

## 2023-10-20 PROCEDURE — 83036 HEMOGLOBIN GLYCOSYLATED A1C: CPT | Mod: HCNC | Performed by: INTERNAL MEDICINE

## 2023-10-20 PROCEDURE — 84443 ASSAY THYROID STIM HORMONE: CPT | Mod: HCNC | Performed by: INTERNAL MEDICINE

## 2023-10-20 PROCEDURE — 99999 PR PBB SHADOW E&M-EST. PATIENT-LVL V: ICD-10-PCS | Mod: PBBFAC,HCNC,, | Performed by: INTERNAL MEDICINE

## 2023-10-20 PROCEDURE — 1159F PR MEDICATION LIST DOCUMENTED IN MEDICAL RECORD: ICD-10-PCS | Mod: HCNC,CPTII,S$GLB, | Performed by: INTERNAL MEDICINE

## 2023-10-20 PROCEDURE — 1101F PR PT FALLS ASSESS DOC 0-1 FALLS W/OUT INJ PAST YR: ICD-10-PCS | Mod: HCNC,CPTII,S$GLB, | Performed by: INTERNAL MEDICINE

## 2023-10-20 PROCEDURE — 84550 ASSAY OF BLOOD/URIC ACID: CPT | Mod: HCNC | Performed by: INTERNAL MEDICINE

## 2023-10-20 PROCEDURE — 1101F PT FALLS ASSESS-DOCD LE1/YR: CPT | Mod: HCNC,CPTII,S$GLB, | Performed by: INTERNAL MEDICINE

## 2023-10-20 PROCEDURE — 3074F SYST BP LT 130 MM HG: CPT | Mod: HCNC,CPTII,S$GLB, | Performed by: INTERNAL MEDICINE

## 2023-10-20 PROCEDURE — 36415 COLL VENOUS BLD VENIPUNCTURE: CPT | Mod: HCNC | Performed by: INTERNAL MEDICINE

## 2023-10-20 PROCEDURE — 3288F FALL RISK ASSESSMENT DOCD: CPT | Mod: HCNC,CPTII,S$GLB, | Performed by: INTERNAL MEDICINE

## 2023-10-20 PROCEDURE — 1126F PR PAIN SEVERITY QUANTIFIED, NO PAIN PRESENT: ICD-10-PCS | Mod: HCNC,CPTII,S$GLB, | Performed by: INTERNAL MEDICINE

## 2023-10-20 PROCEDURE — 80048 BASIC METABOLIC PNL TOTAL CA: CPT | Mod: HCNC | Performed by: INTERNAL MEDICINE

## 2023-10-20 PROCEDURE — 3078F DIAST BP <80 MM HG: CPT | Mod: HCNC,CPTII,S$GLB, | Performed by: INTERNAL MEDICINE

## 2023-10-20 NOTE — PATIENT INSTRUCTIONS
Labs today  Urine today  Schedule dermatology appointment (Dr. Kylie Alcaraz)  Return to clinic in 6 months or sooner if needed.
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
## 2023-11-22 ENCOUNTER — PATIENT OUTREACH (OUTPATIENT)
Dept: ADMINISTRATIVE | Facility: HOSPITAL | Age: 78
End: 2023-11-22
Payer: MEDICARE

## 2023-12-15 ENCOUNTER — OFFICE VISIT (OUTPATIENT)
Dept: DERMATOLOGY | Facility: CLINIC | Age: 78
End: 2023-12-15
Payer: MEDICARE

## 2023-12-15 VITALS — BODY MASS INDEX: 28.37 KG/M2 | WEIGHT: 215 LBS

## 2023-12-15 DIAGNOSIS — Z85.828 HISTORY OF BASAL CELL CARCINOMA: ICD-10-CM

## 2023-12-15 DIAGNOSIS — L98.9 SKIN LESION: ICD-10-CM

## 2023-12-15 DIAGNOSIS — S41.101A NON-HEALING WOUND OF RIGHT UPPER EXTREMITY: ICD-10-CM

## 2023-12-15 DIAGNOSIS — L81.4 LENTIGINES: ICD-10-CM

## 2023-12-15 DIAGNOSIS — L82.1 SEBORRHEIC KERATOSES: ICD-10-CM

## 2023-12-15 DIAGNOSIS — D48.5 NEOPLASM OF UNCERTAIN BEHAVIOR OF SKIN: Primary | ICD-10-CM

## 2023-12-15 DIAGNOSIS — D22.9 NEVUS OF MULTIPLE SITES: ICD-10-CM

## 2023-12-15 PROCEDURE — 3288F FALL RISK ASSESSMENT DOCD: CPT | Mod: HCNC,CPTII,S$GLB, | Performed by: DERMATOLOGY

## 2023-12-15 PROCEDURE — 88341 IMHCHEM/IMCYTCHM EA ADD ANTB: CPT | Mod: 59,HCNC | Performed by: PATHOLOGY

## 2023-12-15 PROCEDURE — 88342 IMHCHEM/IMCYTCHM 1ST ANTB: CPT | Mod: HCNC | Performed by: PATHOLOGY

## 2023-12-15 PROCEDURE — 88342 IMHCHEM/IMCYTCHM 1ST ANTB: CPT | Mod: 26,HCNC,, | Performed by: PATHOLOGY

## 2023-12-15 PROCEDURE — 11102 PR TANGENTIAL BIOPSY, SKIN, SINGLE LESION: ICD-10-PCS | Mod: HCNC,S$GLB,, | Performed by: DERMATOLOGY

## 2023-12-15 PROCEDURE — 99214 PR OFFICE/OUTPT VISIT, EST, LEVL IV, 30-39 MIN: ICD-10-PCS | Mod: 25,HCNC,S$GLB, | Performed by: DERMATOLOGY

## 2023-12-15 PROCEDURE — 99214 OFFICE O/P EST MOD 30 MIN: CPT | Mod: 25,HCNC,S$GLB, | Performed by: DERMATOLOGY

## 2023-12-15 PROCEDURE — 1101F PT FALLS ASSESS-DOCD LE1/YR: CPT | Mod: HCNC,CPTII,S$GLB, | Performed by: DERMATOLOGY

## 2023-12-15 PROCEDURE — 1126F PR PAIN SEVERITY QUANTIFIED, NO PAIN PRESENT: ICD-10-PCS | Mod: HCNC,CPTII,S$GLB, | Performed by: DERMATOLOGY

## 2023-12-15 PROCEDURE — 1101F PR PT FALLS ASSESS DOC 0-1 FALLS W/OUT INJ PAST YR: ICD-10-PCS | Mod: HCNC,CPTII,S$GLB, | Performed by: DERMATOLOGY

## 2023-12-15 PROCEDURE — 3288F PR FALLS RISK ASSESSMENT DOCUMENTED: ICD-10-PCS | Mod: HCNC,CPTII,S$GLB, | Performed by: DERMATOLOGY

## 2023-12-15 PROCEDURE — 1126F AMNT PAIN NOTED NONE PRSNT: CPT | Mod: HCNC,CPTII,S$GLB, | Performed by: DERMATOLOGY

## 2023-12-15 PROCEDURE — 1160F RVW MEDS BY RX/DR IN RCRD: CPT | Mod: HCNC,CPTII,S$GLB, | Performed by: DERMATOLOGY

## 2023-12-15 PROCEDURE — 1159F MED LIST DOCD IN RCRD: CPT | Mod: HCNC,CPTII,S$GLB, | Performed by: DERMATOLOGY

## 2023-12-15 PROCEDURE — 99999 PR PBB SHADOW E&M-EST. PATIENT-LVL III: ICD-10-PCS | Mod: PBBFAC,HCNC,, | Performed by: DERMATOLOGY

## 2023-12-15 PROCEDURE — 11103 PR TANGENTIAL BIOPSY, SKIN, EA ADDTL LESION: ICD-10-PCS | Mod: HCNC,S$GLB,, | Performed by: DERMATOLOGY

## 2023-12-15 PROCEDURE — 88305 TISSUE EXAM BY PATHOLOGIST: CPT | Mod: 59,HCNC | Performed by: PATHOLOGY

## 2023-12-15 PROCEDURE — 11103 TANGNTL BX SKIN EA SEP/ADDL: CPT | Mod: HCNC,S$GLB,, | Performed by: DERMATOLOGY

## 2023-12-15 PROCEDURE — 11102 TANGNTL BX SKIN SINGLE LES: CPT | Mod: HCNC,S$GLB,, | Performed by: DERMATOLOGY

## 2023-12-15 PROCEDURE — 88341 PR IHC OR ICC EACH ADD'L SINGLE ANTIBODY  STAINPR: ICD-10-PCS | Mod: 26,HCNC,, | Performed by: PATHOLOGY

## 2023-12-15 PROCEDURE — 88305 TISSUE EXAM BY PATHOLOGIST: ICD-10-PCS | Mod: 26,HCNC,, | Performed by: PATHOLOGY

## 2023-12-15 PROCEDURE — 88342 CHG IMMUNOCYTOCHEMISTRY: ICD-10-PCS | Mod: 26,HCNC,, | Performed by: PATHOLOGY

## 2023-12-15 PROCEDURE — 99999 PR PBB SHADOW E&M-EST. PATIENT-LVL III: CPT | Mod: PBBFAC,HCNC,, | Performed by: DERMATOLOGY

## 2023-12-15 PROCEDURE — 1160F PR REVIEW ALL MEDS BY PRESCRIBER/CLIN PHARMACIST DOCUMENTED: ICD-10-PCS | Mod: HCNC,CPTII,S$GLB, | Performed by: DERMATOLOGY

## 2023-12-15 PROCEDURE — 88305 TISSUE EXAM BY PATHOLOGIST: CPT | Mod: 26,HCNC,, | Performed by: PATHOLOGY

## 2023-12-15 PROCEDURE — 88341 IMHCHEM/IMCYTCHM EA ADD ANTB: CPT | Mod: 26,HCNC,, | Performed by: PATHOLOGY

## 2023-12-15 PROCEDURE — 1159F PR MEDICATION LIST DOCUMENTED IN MEDICAL RECORD: ICD-10-PCS | Mod: HCNC,CPTII,S$GLB, | Performed by: DERMATOLOGY

## 2023-12-15 NOTE — PROGRESS NOTES
Subjective:      Patient ID:  Ezekiel Mueller is a 78 y.o. male who presents for   Chief Complaint   Patient presents with    Follow-up     UBSE     Would like skin check, spots on upper back and right arm which bleeds sometimes.     Follow-up - Follow-up  Affected locations: face, chest, torso and right arm  Signs / symptoms: non-healing    Spot      Review of Systems   Constitutional:  Negative for fever, chills, weight loss, weight gain, fatigue, night sweats and malaise.   Skin:  Negative for daily sunscreen use, activity-related sunscreen use and wears hat.   Hematologic/Lymphatic: Does not bruise/bleed easily.       Objective:   Physical Exam   Constitutional: He appears well-developed and well-nourished. No distress.   Neurological: He is alert and oriented to person, place, and time. He is not disoriented.   Psychiatric: He has a normal mood and affect.   Skin:   Areas Examined (abnormalities noted in diagram):   Head / Face Inspection Performed  Neck Inspection Performed  Chest / Axilla Inspection Performed  Back Inspection Performed  RUE Inspected  LUE Inspection Performed                 Diagram Legend     Erythematous scaling macule/papule c/w actinic keratosis       Vascular papule c/w angioma      Pigmented verrucoid papule/plaque c/w seborrheic keratosis      Yellow umbilicated papule c/w sebaceous hyperplasia      Irregularly shaped tan macule c/w lentigo     1-2 mm smooth white papules consistent with Milia      Movable subcutaneous cyst with punctum c/w epidermal inclusion cyst      Subcutaneous movable cyst c/w pilar cyst      Firm pink to brown papule c/w dermatofibroma      Pedunculated fleshy papule(s) c/w skin tag(s)      Evenly pigmented macule c/w junctional nevus     Mildly variegated pigmented, slightly irregular-bordered macule c/w mildly atypical nevus      Flesh colored to evenly pigmented papule c/w intradermal nevus       Pink pearly papule/plaque c/w basal cell carcinoma       Erythematous hyperkeratotic cursted plaque c/w SCC      Surgical scar with no sign of skin cancer recurrence      Open and closed comedones      Inflammatory papules and pustules      Verrucoid papule consistent consistent with wart     Erythematous eczematous patches and plaques     Dystrophic onycholytic nail with subungual debris c/w onychomycosis     Umbilicated papule    Erythematous-base heme-crusted tan verrucoid plaque consistent with inflamed seborrheic keratosis     Erythematous Silvery Scaling Plaque c/w Psoriasis     See annotation      Assessment / Plan:      Pathology Orders:       Normal Orders This Visit    Specimen to Pathology, Dermatology     Questions:    Procedure Type: Dermatology and skin neoplasms    Number of Specimens: 2    ------------------------: -------------------------    Spec 1 Procedure: Biopsy    Spec 1 Clinical Impression: r/o bcc    Spec 1 Source: right upper back    ------------------------: -------------------------    Spec 2 Procedure: Biopsy    Spec 2 Clinical Impression: r/o bcc    Spec 2 Source: right arm    Release to patient:           Neoplasm of uncertain behavior of skin  -     Specimen to Pathology, Dermatology  Right upper back  Shave biopsy performed after verbal consent including risk of infection, scar, recurrence, need for additional treatment of site. Area prepped with alcohol, anesthetized with 1% lidocaine with epinephrine. . Hemostasis achieved with monsels. No complications. Dressing applied. Wound care explained. Will need further rx if + ca      Right arm  Shave biopsy performed after verbal consent including risk of infection, scar, recurrence, need for additional treatment of site. Area prepped with alcohol, anesthetized with 1% lidocaine with epinephrine. . Hemostasis achieved with monsels. No complications. Dressing applied. Wound care explained. Will need further rx if + ca        Non-healing wound of right upper extremity  Comments:  right shoulder,  "lesion present for 6+ months, referral placed to dermatology  Orders:  -     Ambulatory referral/consult to Dermatology    Skin lesion  -     Ambulatory referral/consult to Dermatology    History of basal cell carcinoma  -     Ambulatory referral/consult to Dermatology  Right eyelid  Area(s) of previous NMSC evaluated with no signs of recurrence.    Recommend daily sun protection/avoidance and use of at least SPF 30, broad spectrum sunscreen (OTC drug).       Seborrheic keratoses  Seborrheic keratosis scattered, told benign no treatment needed.        Nevus of multiple sites  The "ABCD" rules to observe pigmented lesions were reviewed.      Lentigines  The "ABCD" rules to observe pigmented lesions were reviewed.               Follow up in about 6 months (around 6/15/2024).  "

## 2023-12-22 LAB
FINAL PATHOLOGIC DIAGNOSIS: NORMAL
GROSS: NORMAL
Lab: NORMAL
MICROSCOPIC EXAM: NORMAL

## 2023-12-22 NOTE — PROGRESS NOTES
The biopsies are skin cancers, he will need mohs surgery.  7 d ago   Final Pathologic Diagnosis 1. Skin, right upper back, shave biopsy:  - BASAL CELL CARCINOMA WITH MIXED SUPERFICIAL AND NODULAR GROWTH PATTERN.  - THE TUMOR EXTENDS TO THE DEEP AND LATERAL BIOPSY MARGINS.    This lesion is skin cancer. You will be contacted regarding treatment.    2. Skin, right arm, shave biopsy:  - BASAL CELL CARCINOMA WITH MIXED NODULAR AND INFILTRATIVE GROWTH PATTERN.  - THE TUMOR EXTENDS TO THE DEEP AND LATERAL BIOPSY MARGINS.

## 2024-01-09 NOTE — TELEPHONE ENCOUNTER
Try calling pt, no answer, left vm for pt to give us a call back or to give the pharmacy a call about the refusal of the medication. Also sent pt a message on the portal.   Show Applicator Variable?: Yes Post-Care Instructions: I reviewed with the patient in detail post-care instructions. Patient is to wear sunprotection, and avoid picking at any of the treated lesions. Pt may apply Vaseline to crusted or scabbing areas. Render Note In Bullet Format When Appropriate: No Duration Of Freeze Thaw-Cycle (Seconds): 3 Number Of Freeze-Thaw Cycles: 1 freeze-thaw cycle Consent: The patient's consent was obtained including but not limited to risks of crusting, scabbing, blistering, scarring, darker or lighter pigmentary change, recurrence, incomplete removal and infection. Detail Level: Detailed

## 2024-01-30 ENCOUNTER — OFFICE VISIT (OUTPATIENT)
Dept: OPHTHALMOLOGY | Facility: CLINIC | Age: 79
End: 2024-01-30
Attending: OPHTHALMOLOGY
Payer: MEDICARE

## 2024-01-30 DIAGNOSIS — H17.9 CORNEAL SCAR WITH OPACITY: ICD-10-CM

## 2024-01-30 DIAGNOSIS — H18.9 EXPOSURE KERATOPATHY: ICD-10-CM

## 2024-01-30 DIAGNOSIS — H35.373 EPIRETINAL MEMBRANE (ERM) OF BOTH EYES: ICD-10-CM

## 2024-01-30 DIAGNOSIS — H02.22B MECHANICAL LAGOPHTHALMOS OF UPPER AND LOWER EYELID OF LEFT EYE: ICD-10-CM

## 2024-01-30 DIAGNOSIS — H25.13 NUCLEAR SCLEROSIS, BILATERAL: Primary | ICD-10-CM

## 2024-01-30 PROCEDURE — 1126F AMNT PAIN NOTED NONE PRSNT: CPT | Mod: CPTII,S$GLB,, | Performed by: OPHTHALMOLOGY

## 2024-01-30 PROCEDURE — 99204 OFFICE O/P NEW MOD 45 MIN: CPT | Mod: S$GLB,,, | Performed by: OPHTHALMOLOGY

## 2024-01-30 PROCEDURE — 1159F MED LIST DOCD IN RCRD: CPT | Mod: CPTII,S$GLB,, | Performed by: OPHTHALMOLOGY

## 2024-01-30 PROCEDURE — 1101F PT FALLS ASSESS-DOCD LE1/YR: CPT | Mod: CPTII,S$GLB,, | Performed by: OPHTHALMOLOGY

## 2024-01-30 PROCEDURE — 3288F FALL RISK ASSESSMENT DOCD: CPT | Mod: CPTII,S$GLB,, | Performed by: OPHTHALMOLOGY

## 2024-01-30 PROCEDURE — 1160F RVW MEDS BY RX/DR IN RCRD: CPT | Mod: CPTII,S$GLB,, | Performed by: OPHTHALMOLOGY

## 2024-01-30 PROCEDURE — 92136 OPHTHALMIC BIOMETRY: CPT | Mod: RT,S$GLB,, | Performed by: OPHTHALMOLOGY

## 2024-01-30 PROCEDURE — 99999 PR PBB SHADOW E&M-EST. PATIENT-LVL III: CPT | Mod: PBBFAC,,, | Performed by: OPHTHALMOLOGY

## 2024-01-30 RX ORDER — PREDNISOLONE ACETATE-GATIFLOXACIN-BROMFENAC .75; 5; 1 MG/ML; MG/ML; MG/ML
1 SUSPENSION/ DROPS OPHTHALMIC 3 TIMES DAILY
Qty: 5 ML | Refills: 3 | Status: SHIPPED | OUTPATIENT
Start: 2024-01-30 | End: 2024-05-16 | Stop reason: ALTCHOICE

## 2024-01-30 RX ORDER — CYCLOP/TROP/PROPA/PHEN/KET/WAT 1-1-0.1%
1 DROPS (EA) OPHTHALMIC (EYE)
Status: CANCELLED | OUTPATIENT
Start: 2024-01-30

## 2024-01-30 RX ORDER — MOXIFLOXACIN 5 MG/ML
1 SOLUTION/ DROPS OPHTHALMIC
Status: CANCELLED | OUTPATIENT
Start: 2024-01-30

## 2024-01-30 RX ORDER — PHENYLEPHRINE HYDROCHLORIDE 100 MG/ML
1 SOLUTION/ DROPS OPHTHALMIC
Status: CANCELLED | OUTPATIENT
Start: 2024-01-30

## 2024-01-30 NOTE — PROGRESS NOTES
HPI    Patient present today for Cataract Evaluation   Pt state blurry [facility] OU. Glare OU.   Floaters OU. Denies flashes. Pain OU while sleeping   Last edited by Jadon Elder on 1/30/2024  1:09 PM.            Assessment /Plan     For exam results, see Encounter Report.    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 Now with exposure keratopathy and K scarring and NV centrally OS  OS now has poor potential related to corneal scarring and thinning.  ERM noted OU with macular pucker and folds OU. May need retina eval later...    Visually Significant Cataract: Patient reports decreased vision consistent with the clinical amount of lenticular opacity, which reaches the level of visual significance and affects activities of daily living.     Specifically, this patient describes difficulty with:  - driving safely at night  - reading road signs  - reading small print  - deciphering medicine bottles  - reading the newspaper  - using the phone  - reading texts     Risks, benefits, and alternatives to cataract surgery were discussed and the consent reviewed. IOL options were discussed, including ATIOLs and the associated side effects and additional patient cost associated with them.   IOL Selections:   Right eye  IOL: CNA0T0 23.5     Left eye  IOL: NA    Pt wishes to have RIGHT eye done first.    OS WITH K SCARRING AND EXPOSURE

## 2024-02-01 DIAGNOSIS — E78.5 HYPERLIPIDEMIA LDL GOAL <100: ICD-10-CM

## 2024-02-01 DIAGNOSIS — I70.0 ATHEROSCLEROSIS OF AORTA: ICD-10-CM

## 2024-02-01 RX ORDER — ATORVASTATIN CALCIUM 40 MG/1
TABLET, FILM COATED ORAL
Refills: 0 | OUTPATIENT
Start: 2024-02-01

## 2024-02-01 NOTE — TELEPHONE ENCOUNTER
Care Due:                  Date            Visit Type   Department     Provider  --------------------------------------------------------------------------------                                EP -                              PRIMARY      [facility] INTERNAL  Last Visit: 10-      CARE ([facility])   KATHY Dudley                               -                              PRIMARY      [facility] INTERNAL  Next Visit: 04-      CARE ([facility])   KATHY Dudley                                                            Last  Test          Frequency    Reason                     Performed    Due Date  --------------------------------------------------------------------------------    HBA1C.......  6 months...  metFORMIN................  10-   04-    [employer] Embedded Care Due Messages. Reference number: [id number].   2/01/2024 9:40:10 AM CST

## 2024-02-01 NOTE — TELEPHONE ENCOUNTER
Refill Decision Note   Ezekiel Mueller  is requesting a refill authorization.  Brief Assessment and Rationale for Refill:  Quick Discontinue     Medication Therapy Plan: Pharmacy is requesting new scripts for the following medications without required information, (sig/ frequency/qty/etc)     Medication Reconciliation Completed: No   Comments:     No Care Gaps recommended.     Note composed:9:49 AM 02/01/2024

## 2024-02-07 ENCOUNTER — PROCEDURE VISIT (OUTPATIENT)
Dept: DERMATOLOGY | Facility: CLINIC | Age: 79
End: 2024-02-07
Payer: MEDICARE

## 2024-02-07 VITALS
DIASTOLIC BLOOD PRESSURE: 78 MMHG | BODY MASS INDEX: 28.49 KG/M2 | WEIGHT: 214.94 LBS | SYSTOLIC BLOOD PRESSURE: 135 MMHG | HEART RATE: 57 BPM | HEIGHT: 73 IN

## 2024-02-07 DIAGNOSIS — C44.612 BASAL CELL CARCINOMA (BCC) OF RIGHT UPPER EXTREMITY: ICD-10-CM

## 2024-02-07 DIAGNOSIS — C44.519 BASAL CELL CARCINOMA, TRUNK: Primary | ICD-10-CM

## 2024-02-07 PROCEDURE — 99499 UNLISTED E&M SERVICE: CPT | Mod: HCNC,S$GLB,, | Performed by: DERMATOLOGY

## 2024-02-07 PROCEDURE — 17313 MOHS 1 STAGE T/A/L: CPT | Mod: HCNC,S$GLB,, | Performed by: DERMATOLOGY

## 2024-02-07 PROCEDURE — 13121 CMPLX RPR S/A/L 2.6-7.5 CM: CPT | Mod: HCNC,51,S$GLB, | Performed by: DERMATOLOGY

## 2024-02-07 PROCEDURE — 13101 CMPLX RPR TRUNK 2.6-7.5 CM: CPT | Mod: HCNC,51,S$GLB, | Performed by: DERMATOLOGY

## 2024-02-07 NOTE — PROGRESS NOTES
PROCEDURE: Mohs' Micrographic Surgery    INDICATION: Tumors with aggressive clinical behavior (rapidly growing, greater than 1 cm in diameter). Tumor with ill-defined borders.    REFERRING PROVIDER: Kylie Alcaraz M.D.    CASE NUMBER:     ANESTHETIC: 5 cc 0.5% Lidocaine with Epi 1:200,000 mixed 1:1 with 0.5% Bupivacaine    SURGICAL PREP: Hibiclens    SURGEON: Willis Villafana MD    ASSISTANTS: Shanita Andrews PA-C    PREOPERATIVE DIAGNOSIS: basal cell carcinoma- superficial, nodular    POSTOPERATIVE DIAGNOSIS: basal cell carcinoma    PATHOLOGIC DIAGNOSIS: basal cell carcinoma- superficial, nodular    HISTOLOGY OF SPECIMENS IN FIRST STAGE:   Tumor Type:  No tumor seen.    STAGES OF MOHS' SURGERY PERFORMED: 1    TUMOR-FREE PLANE ACHIEVED: Yes    HEMOSTASIS: electrocoagulation     SPECIMENS: 3     LOCATION: right upper back. Location verified with Dr. Alcaraz's clinical photograph. Patient also verified location by looking at photo taken prior to procedure.     INITIAL LESION SIZE: 1.1 x 1.6 cm    FINAL DEFECT SIZE: 2.2 x 2.2 cm    WOUND REPAIR/DISPOSITION: The patient tolerated Mohs' Micrographic Surgery for a basal cell carcinoma very well. When the tumor was completely removed, a repair of the surgical defect was undertaken.        PROCEDURE: Complex Linear Repair    INDICATION: Status post Mohs' Micrographic Surgery for basal cell carcinoma.    CASE NUMBER:     SURGEON: Willis Villafana MD    ASSISTANTS: Christiano Minor    ANESTHETIC: 3 cc 1% Lidocaine with Epinephrine 1:100,000    SURGICAL PREP: Hibiclens, prepped by Christiano Minor    LOCATION: right upper back    DEFECT SIZE: 2.2 x 2.2 cm    WOUND REPAIR/DISPOSITION:  After the patient's carcinoma had been completely removed with Mohs' Micrographic Surgery, a repair of the surgical defect was undertaken. The patient was returned to the operating suite where the area of right upper back was prepped, draped, and anesthetized in the usual sterile  "fashion. The wound was widely undermined in all directions. The wound was undermined to a distance at least the maximum width of the defect as measured perpendicular to the closure line along at least one entire edge of the defect, in this case 2.5 cm. Then, electrocoagulation was used to obtain meticulous hemostasis. 3-0 Vicryl buried vertical mattress sutures were placed into the subcutaneous and dermal plane to close the wound and mariella the cutaneous wound edge. Bilateral dog ears were identified and were removed by a standard Burow's triangle technique. The cutaneous wound edges were closed using running 4-0 Prolene suture.    The patient tolerated the procedure well.    The area was cleaned and dressed appropriately and the patient was given wound care instructions, as well as appointment for follow-up evaluation and suture removal in 14 days.    LENGTH OF REPAIR: 4 cm    Vitals:    02/07/24 1131 02/07/24 1425   BP: 123/72 135/78   BP Location: Left arm Left arm   Patient Position: Sitting Sitting   BP Method: Medium (Automatic) Medium (Automatic)   Pulse: (!) 59 (!) 57   Weight: 97.5 kg (214 lb 15.2 oz)    Height: 6' 1" (1.854 m)        PROCEDURE: Mohs' Micrographic Surgery    INDICATION: Tumors with aggressive clinical behavior (rapidly growing, greater than 1 cm in diameter). Tumor with ill-defined borders. Tumor with aggressive histopathology. Aggressive histopathology including sclerosing, morpheaform/infiltrating, micronodular, superficial multicentric, poorly differentiated, basosquamous, or perineural invasion.    REFERRING PROVIDER: Kylie Alcaraz M.D.    CASE NUMBER:     ANESTHETIC: 4 cc 0.5% Lidocaine with Epi 1:200,000 mixed 1:1 with 0.5% Bupivacaine    SURGICAL PREP: Hibiclens    SURGEON: Willis Villafana MD    ASSISTANTS: Shanita Andrews PA-C    PREOPERATIVE DIAGNOSIS: basal cell carcinoma- nodular, infiltrating    POSTOPERATIVE DIAGNOSIS: basal cell carcinoma- nodular, infiltrating    PATHOLOGIC " DIAGNOSIS: basal cell carcinoma- nodular, infiltrating    HISTOLOGY OF SPECIMENS IN FIRST STAGE:   Debulking tumor confirms nodular and infiltrating basal cell carcinoma.  Tumor Type: No tumor seen. Actinic keratosis: Keratinocyte atypia along the basal layer.    STAGES OF MOHS' SURGERY PERFORMED: 1    TUMOR-FREE PLANE ACHIEVED: Yes    HEMOSTASIS: electrocoagulation     SPECIMENS: 2     LOCATION: right arm. Location verified with Dr. Alcaraz's clinical photograph. Patient also verified location.    INITIAL LESION SIZE: 1.5 x 1.6 cm    FINAL DEFECT SIZE: 1.9 x 1.9 cm    WOUND REPAIR/DISPOSITION: The patient tolerated Mohs' Micrographic Surgery for a basal cell carcinoma very well. When the tumor was completely removed, a repair of the surgical defect was undertaken.        PROCEDURE: Complex Linear Repair    INDICATION: Status post Mohs' Micrographic Surgery for basal cell carcinoma.    CASE NUMBER:     SURGEON: Willis Villafana MD    ASSISTANTS: Shanita Andrews PA-C and Kathya Liu Surg Jennifer    ANESTHETIC: 2 cc 1% Lidocaine with Epinephrine 1:100,000    SURGICAL PREP: Hibiclens, prepped by Kathya Liu Surg Jennifer    LOCATION: right arm    DEFECT SIZE: 1.9 x 1.9 cm    WOUND REPAIR/DISPOSITION:  After the patient's carcinoma had been completely removed with Mohs' Micrographic Surgery, a repair of the surgical defect was undertaken. The patient was returned to the operating suite where the area of right arm was prepped, draped, and anesthetized in the usual sterile fashion. The wound was widely undermined in all directions. The wound was undermined to a distance at least the maximum width of the defect as measured perpendicular to the closure line along at least one entire edge of the defect, in this case 2 cm. Then, electrocoagulation was used to obtain meticulous hemostasis. 4-0 Vicryl buried vertical mattress sutures were placed into the subcutaneous and dermal plane to close the wound and mariella the cutaneous wound  "edge. Bilateral dog ears were identified and were removed by a standard Burow's triangle technique. The cutaneous wound edges were closed using interrupted and running 4-0 Prolene sutures.    The patient tolerated the procedure well.    The area was cleaned and dressed appropriately and the patient was given wound care instructions, as well as appointment for follow-up evaluation and suture removal in 14 days.    LENGTH OF REPAIR: 5 cm    Vitals:    02/07/24 1131 02/07/24 1425   BP: 123/72 135/78   BP Location: Left arm Left arm   Patient Position: Sitting Sitting   BP Method: Medium (Automatic) Medium (Automatic)   Pulse: (!) 59 (!) 57   Weight: 97.5 kg (214 lb 15.2 oz)    Height: 6' 1" (1.854 m)        "

## 2024-02-09 DIAGNOSIS — R73.03 BORDERLINE DIABETES MELLITUS: ICD-10-CM

## 2024-02-09 RX ORDER — METFORMIN HYDROCHLORIDE 500 MG/1
500 TABLET ORAL 2 TIMES DAILY WITH MEALS
Qty: 180 TABLET | Refills: 0 | Status: SHIPPED | OUTPATIENT
Start: 2024-02-09 | End: 2024-05-06

## 2024-02-09 NOTE — TELEPHONE ENCOUNTER
Refill Decision Note   Ezekiel Ervin  is requesting a refill authorization.  Brief Assessment and Rationale for Refill:  Approve     Medication Therapy Plan:         Comments:     Note composed:12:41 PM 02/09/2024

## 2024-02-09 NOTE — TELEPHONE ENCOUNTER
No care due was identified.  [facility] Embedded Care Due Messages. Reference number: [id number].   2/09/2024 11:06:03 AM CST

## 2024-02-17 DIAGNOSIS — I10 ESSENTIAL HYPERTENSION: ICD-10-CM

## 2024-02-19 ENCOUNTER — PATIENT MESSAGE (OUTPATIENT)
Dept: CARDIOLOGY | Facility: CLINIC | Age: 79
End: 2024-02-19
Payer: MEDICARE

## 2024-02-19 RX ORDER — AMLODIPINE BESYLATE 10 MG/1
10 TABLET ORAL DAILY
Qty: 90 TABLET | Refills: 3 | Status: SHIPPED | OUTPATIENT
Start: 2024-02-19 | End: 2024-05-10 | Stop reason: SDUPTHER

## 2024-02-21 ENCOUNTER — OFFICE VISIT (OUTPATIENT)
Dept: DERMATOLOGY | Facility: CLINIC | Age: 79
End: 2024-02-21
Payer: MEDICARE

## 2024-02-21 DIAGNOSIS — Z09 POSTOP CHECK: Primary | ICD-10-CM

## 2024-02-21 PROCEDURE — 1101F PT FALLS ASSESS-DOCD LE1/YR: CPT | Mod: HCNC,CPTII,S$GLB, | Performed by: DERMATOLOGY

## 2024-02-21 PROCEDURE — 99999 PR PBB SHADOW E&M-EST. PATIENT-LVL I: CPT | Mod: PBBFAC,HCNC,, | Performed by: DERMATOLOGY

## 2024-02-21 PROCEDURE — 3288F FALL RISK ASSESSMENT DOCD: CPT | Mod: HCNC,CPTII,S$GLB, | Performed by: DERMATOLOGY

## 2024-02-21 PROCEDURE — 99024 POSTOP FOLLOW-UP VISIT: CPT | Mod: HCNC,S$GLB,, | Performed by: DERMATOLOGY

## 2024-02-21 PROCEDURE — 1126F AMNT PAIN NOTED NONE PRSNT: CPT | Mod: HCNC,CPTII,S$GLB, | Performed by: DERMATOLOGY

## 2024-02-21 NOTE — PROGRESS NOTES
78 y.o. male patient is here for suture removal following Mohs' surgery.    Patient reports no problems.    WOUND PE:  The right upper back and right arm sutures intact. Wound healing well. Good skin edges. No signs or symptoms of infection.    IMPRESSION:  Healing operative site from Mohs' surgery BCC, right upper back and right arm s/p Mohs with CLC, postop day #14.    PLAN:  Sutures removed today by  Miri Almanzar MA . Steri-strips applied.  Continue wound care.  Keep moist with Aquaphor.    RTC:  In 3-6 months with Kylie Alcaraz M.D. for skin check or sooner if new concern arises.

## 2024-02-23 ENCOUNTER — TELEPHONE (OUTPATIENT)
Dept: OPHTHALMOLOGY | Facility: CLINIC | Age: 79
End: 2024-02-23
Payer: MEDICARE

## 2024-02-23 DIAGNOSIS — H25.11 NUCLEAR SCLEROTIC CATARACT OF RIGHT EYE: Primary | ICD-10-CM

## 2024-03-11 ENCOUNTER — TELEPHONE (OUTPATIENT)
Dept: OPHTHALMOLOGY | Facility: CLINIC | Age: 79
End: 2024-03-11
Payer: MEDICARE

## 2024-03-11 NOTE — TELEPHONE ENCOUNTER
Patient given arrival time of 8:30 am on Thursday March 14. Nothing to eat or drink after 11:59 pm. Start drops into the operative eye today. [address] [facility]

## 2024-03-13 NOTE — PRE-PROCEDURE INSTRUCTIONS
Patient reviewed on 3/13/2024.  Okay to proceed at [place]. The following pre-procedure instructions and arrival time have been reviewed with patient via phone and sent to patient portal for review.  Patient verbalized an understanding.  Pt to be accompanied by son day of procedure as responsible .     Dear Ezekiel     Below you will find basic pre-procedure instructions in preparation for your procedure on 3/14/24 with Dr. Lopez     You should have received your arrival time already from Dr's office.     - Nothing to eat or drink after midnight the night before your procedure until after your procedure, except AM meds with small sips of water.     - HOLD all oral Diabetic medications night before and morning of procedure  - HOLD all Insulin morning of procedure  - HOLD all Fluid pills morning of procedure  - HOLD all non-insulin shots until after surgery (Ozempic, Mounjaro, Trulicity, Victoza, Byetta, Wegovy and Adlyxin) (7 days prior)  - HOLD all vitamins, minerals and herbal supplements morning of procedure   - TAKE all B/P meds, EXCEPT those that contain a fluid pill (ex. Lasix, Hydroclorothiazide/HCTZ)  - USE inhalers as needed and bring AM of surgery  - USE eye drops as directed  -TAKE blood thinner meds AM of surgery unless otherwise instructed by your provider to not take     - Shower and wash face with dial soap for 3 mins PM prior and AM of surgery  - No powder, lotions, creams, oils, gels, ointments, makeup,  or jewelry    - Wear comfortable clothing (button up shirt)     (Patient is required to have a responsible ride to transport home, ride may not leave while patient is in surgery)     -- [facility], 2nd floor Surgery Center, located   @ [address] [place] [address] Floor Registration        If you have any questions or concerns please feel free to contact your surgeon's office.           Please reply to this message as receipt of delivery.     Bonnie  LPN [facility]  Pre-Admit - Anesthesia Dept

## 2024-03-14 ENCOUNTER — HOSPITAL ENCOUNTER (OUTPATIENT)
Facility: HOSPITAL | Age: 79
Discharge: HOME | End: 2024-03-14
Attending: OPHTHALMOLOGY | Admitting: OPHTHALMOLOGY
Payer: MEDICARE

## 2024-03-14 VITALS
WEIGHT: 214 LBS | TEMPERATURE: 98 F | BODY MASS INDEX: 28.36 KG/M2 | RESPIRATION RATE: 16 BRPM | SYSTOLIC BLOOD PRESSURE: 127 MMHG | HEIGHT: 73 IN | OXYGEN SATURATION: 96 % | HEART RATE: 60 BPM | DIASTOLIC BLOOD PRESSURE: 63 MMHG

## 2024-03-14 DIAGNOSIS — H25.11 NUCLEAR SCLEROTIC CATARACT OF RIGHT EYE: Primary | ICD-10-CM

## 2024-03-14 DIAGNOSIS — H25.13 NUCLEAR SCLEROSIS, BILATERAL: ICD-10-CM

## 2024-03-14 LAB — POCT GLUCOSE: 129 MG/DL (ref 70–110)

## 2024-03-14 PROCEDURE — 63600175 PHARM REV CODE 636 W HCPCS: Performed by: OPHTHALMOLOGY

## 2024-03-14 PROCEDURE — 71000015 HC POSTOP RECOV 1ST HR: Performed by: OPHTHALMOLOGY

## 2024-03-14 PROCEDURE — 82962 GLUCOSE BLOOD TEST: CPT | Performed by: OPHTHALMOLOGY

## 2024-03-14 PROCEDURE — 36000707: Performed by: OPHTHALMOLOGY

## 2024-03-14 PROCEDURE — 25000003 PHARM REV CODE 250: Performed by: OPHTHALMOLOGY

## 2024-03-14 PROCEDURE — 99900035 HC TECH TIME PER 15 MIN (STAT)

## 2024-03-14 PROCEDURE — 94761 N-INVAS EAR/PLS OXIMETRY MLT: CPT

## 2024-03-14 PROCEDURE — 66984 XCAPSL CTRC RMVL W/O ECP: CPT | Mod: RT,,, | Performed by: OPHTHALMOLOGY

## 2024-03-14 PROCEDURE — V2632 POST CHMBR INTRAOCULAR LENS: HCPCS | Performed by: OPHTHALMOLOGY

## 2024-03-14 PROCEDURE — 36000706: Performed by: OPHTHALMOLOGY

## 2024-03-14 DEVICE — LENS CLAREON AUTONOME 23.5D: Type: IMPLANTABLE DEVICE | Site: EYE | Status: FUNCTIONAL

## 2024-03-14 RX ORDER — ACETAMINOPHEN 325 MG/1
650 TABLET ORAL EVERY 4 HOURS PRN
Status: DISCONTINUED | OUTPATIENT
Start: 2024-03-14 | End: 2024-03-14 | Stop reason: HOSPADM

## 2024-03-14 RX ORDER — MOXIFLOXACIN 5 MG/ML
1 SOLUTION/ DROPS OPHTHALMIC
Status: COMPLETED | OUTPATIENT
Start: 2024-03-14 | End: 2024-03-14

## 2024-03-14 RX ORDER — MOXIFLOXACIN 5 MG/ML
SOLUTION/ DROPS OPHTHALMIC
Status: DISCONTINUED | OUTPATIENT
Start: 2024-03-14 | End: 2024-03-14 | Stop reason: HOSPADM

## 2024-03-14 RX ORDER — PROPARACAINE HYDROCHLORIDE 5 MG/ML
1 SOLUTION/ DROPS OPHTHALMIC
Status: DISCONTINUED | OUTPATIENT
Start: 2024-03-14 | End: 2024-03-14 | Stop reason: HOSPADM

## 2024-03-14 RX ORDER — CYCLOP/TROP/PROPA/PHEN/KET/WAT 1-1-0.1%
1 DROPS (EA) OPHTHALMIC (EYE)
Status: COMPLETED | OUTPATIENT
Start: 2024-03-14 | End: 2024-03-14

## 2024-03-14 RX ORDER — PHENYLEPHRINE HYDROCHLORIDE 100 MG/ML
1 SOLUTION/ DROPS OPHTHALMIC
Status: DISCONTINUED | OUTPATIENT
Start: 2024-03-14 | End: 2024-03-14 | Stop reason: HOSPADM

## 2024-03-14 RX ORDER — LIDOCAINE HYDROCHLORIDE 40 MG/ML
INJECTION, SOLUTION RETROBULBAR
Status: DISCONTINUED | OUTPATIENT
Start: 2024-03-14 | End: 2024-03-14 | Stop reason: HOSPADM

## 2024-03-14 RX ORDER — PROPARACAINE HYDROCHLORIDE 5 MG/ML
SOLUTION/ DROPS OPHTHALMIC
Status: DISCONTINUED | OUTPATIENT
Start: 2024-03-14 | End: 2024-03-14 | Stop reason: HOSPADM

## 2024-03-14 RX ORDER — MIDAZOLAM HYDROCHLORIDE 1 MG/ML
1 INJECTION INTRAMUSCULAR; INTRAVENOUS
Status: DISCONTINUED | OUTPATIENT
Start: 2024-03-14 | End: 2024-03-14

## 2024-03-14 RX ORDER — MOXIFLOXACIN 5 MG/ML
1 SOLUTION/ DROPS OPHTHALMIC
Status: DISCONTINUED | OUTPATIENT
Start: 2024-03-14 | End: 2024-03-14 | Stop reason: HOSPADM

## 2024-03-14 RX ORDER — FENTANYL CITRATE 50 UG/ML
25 INJECTION, SOLUTION INTRAMUSCULAR; INTRAVENOUS
Status: DISCONTINUED | OUTPATIENT
Start: 2024-03-14 | End: 2024-03-14 | Stop reason: HOSPADM

## 2024-03-14 RX ORDER — MIDAZOLAM HYDROCHLORIDE 1 MG/ML
1 INJECTION INTRAMUSCULAR; INTRAVENOUS
Status: DISCONTINUED | OUTPATIENT
Start: 2024-03-14 | End: 2024-03-14 | Stop reason: HOSPADM

## 2024-03-14 RX ADMIN — Medication 1 DROP: at 08:03

## 2024-03-14 RX ADMIN — MOXIFLOXACIN OPHTHALMIC 1 DROP: 5 SOLUTION/ DROPS OPHTHALMIC at 08:03

## 2024-03-14 RX ADMIN — MOXIFLOXACIN OPHTHALMIC 1 DROP: 5 SOLUTION/ DROPS OPHTHALMIC at 07:03

## 2024-03-14 RX ADMIN — MIDAZOLAM HYDROCHLORIDE 2 MG: 1 INJECTION INTRAMUSCULAR; INTRAVENOUS at 09:03

## 2024-03-14 RX ADMIN — MOXIFLOXACIN 1 DROP: 5 SOLUTION/ DROPS OPHTHALMIC at 09:03

## 2024-03-14 RX ADMIN — Medication 1 DROP: at 07:03

## 2024-03-14 NOTE — DISCHARGE SUMMARY
Outcome: Successful outpatient ophthalmic surgical procedure  Preprinted Instructions given to patient.  Regular diet.  Activity: No restrictions  Meds: see Med Rec  Condition: stable  Follow up: 1 day with Dr Lopez  Disposition: Home  Diagnosis: s/p eye surgery  Date of discharge: 03/14/2024

## 2024-03-14 NOTE — OP NOTE
SURGEON:  Tj Lopez M.D.    PREOPERATIVE DIAGNOSIS:    Nuclear Sclerotic Cataract Right Eye    POSTOPERATIVE DIAGNOSIS:    Nuclear Sclerotic Cataract Right Eye    PROCEDURES:    Phacoemulsification with  intraocular lens, Right eye ([id number])    DATE OF SURGERY: 03/14/2024    IMPLANT: CNA0T0 23.5    ANESTHESIA:  moderate sedation with topical Lidocaine. Patient ID confirmed and re-evaluated the patient and anesthesia plan confirming it is suitable for the patient's condition and procedure.    COMPLICATIONS:  None    ESTIMATED BLOOD LOSS: None    SPECIMENS: None    INDICATIONS:    The patient has a history of painless progressive visual loss and difficulty with activities of daily living, which specifically include difficult driving at night due to glare and difficulty reading small print, secondary to cataract formation.  After a thorough discussion of the risks, benefits, and alternatives to cataract surgery, including, but not limited to, the rare risks of infection, retinal detachment, hemorrhage, need for additional surgery, loss of vision, and even loss of the eye, the patient voices understanding and desires to proceed.    DESCRIPTION OF PROCEDURE:    The patients IOL calculations were reviewed, and the lens selection confirmed.   After verification and marking of the proper eye in the preop holding area, the patient was brought to the operating room in supine position where the eye was prepped and draped in standard sterile fashion with 5% Betadine and a lid speculum placed in the eye.   Topical 4% Lidocaine was used in addition to the preoperative anesthesia and the procedure was begun by the creation of a paracentesis incision through which viscoelastic was used to fill the anterior chamber.  Next, a keratome blade was used to create a triplanar temporal clear corneal incision and a cystotome and Utrata forceps used to fashion a continuous curvilinear capsulorrhexis.  Hydrodissection was carried out using  the Costa hydrodissection cannula and the nucleus was found to be mobile.  Phacoemulsification of the nucleus was carried out using a quick chop technique, and all remaining epinuclear and cortical material was removed.  The eye was then reformed with Viscoelastic and the  intraocular lens was implanted into the capsular bag.  All remaining viscoelastics were removed from the eye and at the end of the case the pupil was round, the lens was well-centered within the capsular bag and all wounds were found to be water tight.  Drops of Vigamox and Pred Forte were instilled and a shield was placed over the eye. The patient will follow up with Dr. Lopez in the morning.

## 2024-03-14 NOTE — DISCHARGE INSTRUCTIONS
CATARACT SURGERY    POST-OPERATIVE INSTRUCTIONS    · Apply drops THREE times a day into operative eye for 30 days.    · DO NOT rub your eye    · Wear protective sunglasses during the day    · Resume moderate activity    · Bathe/shower/wash face normally    · DO NOT apply makeup around the operative eye for 1 week.         You should expect    - Blurry vision and halos for 24-48 hours    - Dilated pupil for 24-48 hours    - Scratchy feeling in the eye for 1-2 days    - Curved shadow in your peripheral vision for 2-3 weeks    - Occasional flickering of lights for up to 1 week    -If you experience severe pain or nausea, please call Dr Lopez or the on-call doctor at [phone]    - Plan to see Dr Lopez tomorrow .      [facility]    [address] [facility] [id number]    ** Most patients can drive the next day, but if you do not feel comfortable driving, please arrange for transportation.

## 2024-03-14 NOTE — H&P
Pre-Operative History & Physical  Ophthalmology      SUBJECTIVE:     History of Present Illness:  Patient is a 78 y.o. male presents with Nuclear sclerotic cataract of right eye [H25.11]  Nuclear sclerosis, bilateral [H25.13].    MEDICATIONS:   PTA Medications   Medication Sig    allopurinoL (ZYLOPRIM) 100 MG tablet Take 1 tablet (100 mg total) by mouth once daily.    amLODIPine (NORVASC) 10 MG tablet Take 1 tablet (10 mg total) by mouth once daily. For blood pressure    atorvastatin (LIPITOR) 40 MG tablet Take 1 tablet (40 mg total) by mouth once daily.    blood sugar diagnostic Strp 1 strip by Misc.(Non-Drug; Combo Route) route once daily.    blood-glucose meter Misc 1 kit by Misc.(Non-Drug; Combo Route) route 3 (three) times daily.    cholecalciferol, vitamin D3, (VITAMIN D3) 50 mcg (2,000 unit) Cap Take 1 capsule by mouth once daily.    diclofenac sodium (VOLTAREN) 1 % Gel Apply 2 g topically once daily.    erythromycin (ROMYCIN) ophthalmic ointment Place into the left eye every evening. For 5 days for infection in left eye    hydroCHLOROthiazide (HYDRODIURIL) 25 MG tablet Take 1 tablet (25 mg total) by mouth once daily.    lancing device (BD LANCET DEVICE) Misc 1 Units by Misc.(Non-Drug; Combo Route) route once daily. Please dispense lancets and strips - # 100    losartan (COZAAR) 25 MG tablet Take 0.5 tablets (12.5 mg total) by mouth once daily.    metFORMIN (GLUCOPHAGE) 500 MG tablet TAKE 1 TABLET TWICE DAILY WITH MEALS    multivitamin with minerals tablet Take 1 tablet by mouth once daily.    mupirocin (BACTROBAN) 2 % ointment Use qd for lesion on right arm    naproxen (NAPROSYN) 375 MG tablet Take 1 tablet (375 mg total) by mouth 2 (two) times daily with meals.    omega-3 fatty acids/fish oil (FISH OIL-OMEGA-3 FATTY ACIDS) 300-1,000 mg capsule Take by mouth once daily.    ondansetron (ZOFRAN) 4 MG tablet Take 1 tablet (4 mg total) by mouth every 6 (six) hours.    prednisolon/gatiflox/bromfenac (PREDNISOL  ACE-GATIFLOX-BROMFEN) 1-0.5-0.075 % DrpS Apply 1 drop to eye 3 (three) times daily. in operative eye for 1 month after surgery    rivaroxaban (XARELTO) 20 mg Tab Take 1 tablet (20 mg total) by mouth Daily.    sotaloL (BETAPACE) 80 MG tablet Take 1 tablet (80 mg total) by mouth 2 (two) times daily. First dose on Wednesday Feb 2 in AM    TRUEPLUS LANCETS 33 gauge Misc USE AS DIRECTED THREE TIMES DAILY AS NEEDED    vits A,C,E/lutein/minerals (HEALTHY EYES ORAL) Take by mouth once daily.       ALLERGIES: Review of patient's allergies indicates:  No Known Allergies    PAST MEDICAL HISTORY:   Past Medical History:   Diagnosis Date    Basal cell carcinoma 02/2015    R eyelid    Basal cell carcinoma (BCC) of back 02/07/2024    right upper back    Basal cell carcinoma, arm, right 02/07/2024    right arm    Borderline diabetes 2016    Colon polyps     [facility], none on [facility]    Elevated blood uric acid level 2016    Fatty liver     Hypertension     diagnosed 2005    Pancreatitis 07/2015    etiology unclear     PAST SURGICAL HISTORY:   Past Surgical History:   Procedure Laterality Date    CHOLECYSTECTOMY  2012    COLONOSCOPY      x2    COLONOSCOPY N/A 7/30/2021    Procedure: COLONOSCOPY;  Surgeon: Hector Berry MD;  Location: [facility] ([address] [place] [address]);  Service: Endoscopy;  Laterality: N/A;  fully vaccinated-see immunization record    SKIN CANCER EXCISION       PAST FAMILY HISTORY:   Family History   Problem Relation Age of Onset    Diabetes Mother     Heart disease Mother     Hypertension Mother     Emphysema Father     No Known Problems Sister     No Known Problems Maternal Aunt     No Known Problems Maternal Uncle     No Known Problems Paternal Aunt     No Known Problems Paternal Uncle     No Known Problems Maternal Grandmother     No Known Problems Maternal Grandfather     No Known Problems Paternal Grandmother     No Known Problems Paternal Grandfather     No Known Problems Daughter     No  Known Problems Son     No Known Problems Son     No Known Problems Daughter     Blindness Neg Hx     Glaucoma Neg Hx     Macular degeneration Neg Hx     Retinal detachment Neg Hx     Amblyopia Neg Hx     Cancer Neg Hx     Cataracts Neg Hx     Strabismus Neg Hx     Stroke Neg Hx     Thyroid disease Neg Hx     Melanoma Neg Hx      SOCIAL HISTORY:   Social History     Tobacco Use    Smoking status: Never    Smokeless tobacco: Never   Substance Use Topics    Alcohol use: Not Currently    Drug use: No        MENTAL STATUS: Alert    REVIEW OF SYSTEMS: Negative    OBJECTIVE:     Vital Signs (Most Recent)       Physical Exam:  General: NAD  HEENT: cataract  Lungs: Adequate respirations, symmetrical movements, non-labored  Heart: Intact distal pulses  Abdomen: Soft, nondistended, nontender    ASSESSMENT/PLAN:     Patient is a 78 y.o. male with Nuclear sclerotic cataract of right eye [H25.11]  Nuclear sclerosis, bilateral [H25.13].    - Plan for surgical correction with CEIOL OD.   - Allergies reviewed: Review of patient's allergies indicates:  No Known Allergies  - Risks/benefits/alternatives of the procedure including, but not limited to scarring, bleeding, infection, loss or decreased vision, and/or need for possible repeat surgery discussed with the patient and family.  - Informed consent obtained prior to surgery and the patient/family voiced good understanding.    Carolina Aden MD  [employer], PGY-3

## 2024-03-15 ENCOUNTER — OFFICE VISIT (OUTPATIENT)
Dept: OPHTHALMOLOGY | Facility: CLINIC | Age: 79
End: 2024-03-15
Payer: MEDICARE

## 2024-03-15 DIAGNOSIS — H25.13 NUCLEAR SCLEROSIS, BILATERAL: Primary | ICD-10-CM

## 2024-03-15 PROCEDURE — 1160F RVW MEDS BY RX/DR IN RCRD: CPT | Mod: CPTII,S$GLB,, | Performed by: OPHTHALMOLOGY

## 2024-03-15 PROCEDURE — 3288F FALL RISK ASSESSMENT DOCD: CPT | Mod: CPTII,S$GLB,, | Performed by: OPHTHALMOLOGY

## 2024-03-15 PROCEDURE — 99024 POSTOP FOLLOW-UP VISIT: CPT | Mod: S$GLB,,, | Performed by: OPHTHALMOLOGY

## 2024-03-15 PROCEDURE — 1101F PT FALLS ASSESS-DOCD LE1/YR: CPT | Mod: CPTII,S$GLB,, | Performed by: OPHTHALMOLOGY

## 2024-03-15 PROCEDURE — 1159F MED LIST DOCD IN RCRD: CPT | Mod: CPTII,S$GLB,, | Performed by: OPHTHALMOLOGY

## 2024-03-15 PROCEDURE — 99999 PR PBB SHADOW E&M-EST. PATIENT-LVL III: CPT | Mod: PBBFAC,,, | Performed by: OPHTHALMOLOGY

## 2024-03-15 NOTE — PROGRESS NOTES
HPI    Ezekiel Mueller is a/an 78 y.o. male here for 1 day post op.  Date of procedure: 3/14/2024  Procedure: EXTRACTION, CATARACT, WITH IOL INSERTION  Oral antibiotics: NONE   Eye meds: PGB COMBO GTTS TID OD  IMPLANT: CNA0T0 23.5    Patient doing well following procedure. No pain or discomfort.     Last edited by Juno Albright on 3/15/2024  8:11 AM.            Assessment /Plan     For exam results, see Encounter Report.    Nuclear sclerosis, bilateral      Slit lamp exam:  L/L: nl  K: clear, wound sealed  AC: 1+ cell  Lens: IOL centered and stable    POD1 s/p Phaco/IOL  Appropriate precautions and post op medications reviewed.  Patient instructed to call or come in if symptoms of redness, decreased vision, or pain are experienced.

## 2024-03-22 ENCOUNTER — OFFICE VISIT (OUTPATIENT)
Dept: OPHTHALMOLOGY | Facility: CLINIC | Age: 79
End: 2024-03-22
Payer: MEDICARE

## 2024-03-22 DIAGNOSIS — Z98.890 POST-OPERATIVE STATE: Primary | ICD-10-CM

## 2024-03-22 PROCEDURE — 1159F MED LIST DOCD IN RCRD: CPT | Mod: CPTII,S$GLB,, | Performed by: OPHTHALMOLOGY

## 2024-03-22 PROCEDURE — 3288F FALL RISK ASSESSMENT DOCD: CPT | Mod: CPTII,S$GLB,, | Performed by: OPHTHALMOLOGY

## 2024-03-22 PROCEDURE — 99999 PR PBB SHADOW E&M-EST. PATIENT-LVL III: CPT | Mod: PBBFAC,,, | Performed by: OPHTHALMOLOGY

## 2024-03-22 PROCEDURE — 99024 POSTOP FOLLOW-UP VISIT: CPT | Mod: S$GLB,,, | Performed by: OPHTHALMOLOGY

## 2024-03-22 PROCEDURE — 1160F RVW MEDS BY RX/DR IN RCRD: CPT | Mod: CPTII,S$GLB,, | Performed by: OPHTHALMOLOGY

## 2024-03-22 PROCEDURE — 1101F PT FALLS ASSESS-DOCD LE1/YR: CPT | Mod: CPTII,S$GLB,, | Performed by: OPHTHALMOLOGY

## 2024-03-22 NOTE — PROGRESS NOTES
HPI    Ezekiel Mueller is a/an 78 y.o. male here for 1 week post op.  Date of procedure: 3/14/2024  Procedure: EXTRACTION, CATARACT, WITH IOL INSERTION  Oral antibiotics: NONE   Eye meds: PGB COMBO GTTS TID OD  IMPLANT: CNA0T0 23.5    Patient doing well following procedure. No pain or discomfort.     Last edited by Juno Albright on 3/22/2024  9:58 AM.            Assessment /Plan     For exam results, see Encounter Report.    Post-operative state      Slit lamp exam:  L/L: nl  K: clear, wound sealed  AC: trace cell  Iris/Lens: IOL centered and stable    POW1 s/p phaco: Surgery healing well with no signs of infection or abnormal inflammation.   OS with K scar, so monitor                  You will also take minocycline 100 mg 2x/day x 14 days.  Take with food and full glass of water and do not lie down for 30 min after taking.  If this medication gets stuck in your food pipe (esophagus) it can cause a burn.    This medication increases your chance of sunburn, so make sure to wear SPF 30 or higher every day and reapply every 2 hours if outdoors.  It can also cause nausea/stomach upset (especially if taken on empty stomach) , increased risk of yeast infections in females, dizziness and acid reflux in some people.  It can also cause a brown/gray/black pigment deposition in the skin.  This is not dangerous but does not look good.  It often starts in acne scars.   If you notice this, stop the medication as it can become permanent.     If you develop severe headaches, blurry vision, fatigue and joint pain, or yellowing of the skin, discontinue the medication and contact me.      Please let me know if you are having a problem with the medication.    The medication only helps with the inflamed acne (red bumps, pus bumps, cysts), so it is important to also use the creams prescribed.  The creams target the initial plug in the pore which is how all acne starts.

## 2024-03-28 DIAGNOSIS — I70.0 ATHEROSCLEROSIS OF AORTA: ICD-10-CM

## 2024-03-28 DIAGNOSIS — E78.5 HYPERLIPIDEMIA LDL GOAL <100: ICD-10-CM

## 2024-04-01 RX ORDER — ATORVASTATIN CALCIUM 40 MG/1
40 TABLET, FILM COATED ORAL DAILY
Qty: 90 TABLET | Refills: 3 | Status: SHIPPED | OUTPATIENT
Start: 2024-04-01 | End: 2024-05-10 | Stop reason: SDUPTHER

## 2024-04-15 ENCOUNTER — OFFICE VISIT (OUTPATIENT)
Dept: CARDIOLOGY | Facility: CLINIC | Age: 79
End: 2024-04-15
Payer: MEDICARE

## 2024-04-15 VITALS
SYSTOLIC BLOOD PRESSURE: 136 MMHG | DIASTOLIC BLOOD PRESSURE: 65 MMHG | BODY MASS INDEX: 29.16 KG/M2 | HEIGHT: 73 IN | OXYGEN SATURATION: 97 % | HEART RATE: 57 BPM | WEIGHT: 220 LBS

## 2024-04-15 DIAGNOSIS — I10 ESSENTIAL HYPERTENSION: ICD-10-CM

## 2024-04-15 DIAGNOSIS — I48.91 NEW ONSET ATRIAL FIBRILLATION: ICD-10-CM

## 2024-04-15 DIAGNOSIS — E78.5 HYPERLIPIDEMIA LDL GOAL <100: Primary | ICD-10-CM

## 2024-04-15 DIAGNOSIS — I48.0 PAROXYSMAL ATRIAL FIBRILLATION: ICD-10-CM

## 2024-04-15 PROCEDURE — 3075F SYST BP GE 130 - 139MM HG: CPT | Mod: HCNC,CPTII,GC,S$GLB | Performed by: STUDENT IN AN ORGANIZED HEALTH CARE EDUCATION/TRAINING PROGRAM

## 2024-04-15 PROCEDURE — 99214 OFFICE O/P EST MOD 30 MIN: CPT | Mod: HCNC,GC,S$GLB, | Performed by: STUDENT IN AN ORGANIZED HEALTH CARE EDUCATION/TRAINING PROGRAM

## 2024-04-15 PROCEDURE — 99999 PR PBB SHADOW E&M-EST. PATIENT-LVL V: CPT | Mod: PBBFAC,GC,, | Performed by: STUDENT IN AN ORGANIZED HEALTH CARE EDUCATION/TRAINING PROGRAM

## 2024-04-15 PROCEDURE — 1126F AMNT PAIN NOTED NONE PRSNT: CPT | Mod: HCNC,CPTII,GC,S$GLB | Performed by: STUDENT IN AN ORGANIZED HEALTH CARE EDUCATION/TRAINING PROGRAM

## 2024-04-15 PROCEDURE — 3078F DIAST BP <80 MM HG: CPT | Mod: HCNC,CPTII,GC,S$GLB | Performed by: STUDENT IN AN ORGANIZED HEALTH CARE EDUCATION/TRAINING PROGRAM

## 2024-04-15 PROCEDURE — 3288F FALL RISK ASSESSMENT DOCD: CPT | Mod: HCNC,CPTII,GC,S$GLB | Performed by: STUDENT IN AN ORGANIZED HEALTH CARE EDUCATION/TRAINING PROGRAM

## 2024-04-15 PROCEDURE — 1101F PT FALLS ASSESS-DOCD LE1/YR: CPT | Mod: HCNC,CPTII,GC,S$GLB | Performed by: STUDENT IN AN ORGANIZED HEALTH CARE EDUCATION/TRAINING PROGRAM

## 2024-04-16 NOTE — PROGRESS NOTES
Subjective     Chief Complaint: Follow up    History of Present Illness:  Mr. Ezekiel Mueller is a 78 y.o. male with PMH of HTN, HLD, pAF, coronary and aortic atherosclerosis (CT 2021) here for follow up. Last seen 2/2022 by Dr. Villegas for AF (dx 9/2021) follow up. No changes at that antwan. Follows with Dr. Trevino in EP 7% AF burden based on Zio and prior to antiarrhythmic initiation. He is on sotalol 80 mg bid, with no recurrent arrhythmias or symptoms. Reports overall has been doing well and has no recent complaints. Denies orthopnea, LE edema, dyspnea on exertion, chest discomfort, palpitations, lightheadedness.     PAST HISTORY:     Past Medical History:   Diagnosis Date    Basal cell carcinoma 02/2015    R eyelid    Basal cell carcinoma (BCC) of back 02/07/2024    right upper back    Basal cell carcinoma, arm, right 02/07/2024    right arm    Borderline diabetes 2016    Colon polyps     [facility], none on repeat    Elevated blood uric acid level 2016    Fatty liver     Hypertension     diagnosed 2005    Pancreatitis 07/2015    etiology unclear       Cardiac History   Results for orders placed during the hospital encounter of 09/24/21    Echo Saline Bubble? No    Interpretation Summary  · The left ventricle is normal in size with concentric remodeling and normal systolic function. The estimated ejection fraction is 60%.  · Mild right ventricular enlargement with normal right ventricular systolic function.  · Normal left ventricular diastolic function.  · Mild tricuspid regurgitation.  · The estimated PA systolic pressure is 32 mmHg.  · Normal central venous pressure (3 mmHg).    EF   Date Value Ref Range Status   09/24/2021 60 % Final     No results found for this or any previous visit.      MEDICATIONS:     Current Outpatient Medications   Medication Sig Dispense Refill    allopurinoL (ZYLOPRIM) 100 MG tablet Take 1 tablet (100 mg total) by mouth once daily. 90 tablet 3    amLODIPine  (NORVASC) 10 MG tablet Take 1 tablet (10 mg total) by mouth once daily. For blood pressure 90 tablet 3    atorvastatin (LIPITOR) 40 MG tablet Take 1 tablet (40 mg total) by mouth once daily. 90 tablet 3    cholecalciferol, vitamin D3, (VITAMIN D3) 50 mcg (2,000 unit) Cap Take 1 capsule by mouth once daily.      erythromycin (ROMYCIN) ophthalmic ointment Place into the left eye every evening. For 5 days for infection in left eye 3.5 g 0    hydroCHLOROthiazide (HYDRODIURIL) 25 MG tablet Take 1 tablet (25 mg total) by mouth once daily. 90 tablet 3    losartan (COZAAR) 25 MG tablet TAKE 1/2 TABLET ONE TIME DAILY 45 tablet 2    metFORMIN (GLUCOPHAGE) 500 MG tablet TAKE 1 TABLET TWICE DAILY WITH MEALS 180 tablet 0    multivitamin with minerals tablet Take 1 tablet by mouth once daily.      mupirocin (BACTROBAN) 2 % ointment Use qd for lesion on right arm 30 g 3    omega-3 fatty acids/fish oil (FISH OIL-OMEGA-3 FATTY ACIDS) 300-1,000 mg capsule Take by mouth once daily.      prednisolon/gatiflox/bromfenac (PREDNISOL ACE-GATIFLOX-BROMFEN) 1-0.5-0.075 % DrpS Apply 1 drop to eye 3 (three) times daily. in operative eye for 1 month after surgery 5 mL 3    rivaroxaban (XARELTO) 20 mg Tab Take 1 tablet (20 mg total) by mouth Daily. 90 tablet 3    sotaloL (BETAPACE) 80 MG tablet Take 1 tablet (80 mg total) by mouth 2 (two) times daily. First dose on Wednesday Feb 2 in AM 60 tablet 11    vits A,C,E/lutein/minerals (HEALTHY EYES ORAL) Take by mouth once daily.      blood sugar diagnostic Strp 1 strip by Misc.(Non-Drug; Combo Route) route once daily. 100 strip 1    blood-glucose meter Misc 1 kit by Misc.(Non-Drug; Combo Route) route 3 (three) times daily. 1 each 0    diclofenac sodium (VOLTAREN) 1 % Gel Apply 2 g topically once daily. 50 g 2    lancing device (BD LANCET DEVICE) Misc 1 Units by Misc.(Non-Drug; Combo Route) route once daily. Please dispense lancets and strips - # 100 1 each 0    naproxen (NAPROSYN) 375 MG tablet Take 1  "tablet (375 mg total) by mouth 2 (two) times daily with meals. 60 tablet 0    ondansetron (ZOFRAN) 4 MG tablet Take 1 tablet (4 mg total) by mouth every 6 (six) hours. 12 tablet 0    TRUEPLUS LANCETS 33 gauge Misc USE AS DIRECTED THREE TIMES DAILY AS NEEDED 100 each 11     No current facility-administered medications for this visit.       SUBJECTIVE:     Review of Systems   Constitutional:  Negative for malaise/fatigue.   Respiratory:  Negative for shortness of breath.    Cardiovascular:  Negative for chest pain, palpitations, orthopnea, claudication, leg swelling and PND.        OBJECTIVE:     Vital Signs:  Vitals:    04/15/24 1347   BP: 136/65   Pulse: (!) 57   SpO2: 97%   Weight: 99.8 kg (220 lb 0.3 oz)   Height: 6' 1" (1.854 m)     Body mass index is 29.03 kg/m².     Physical Exam  HENT:      Head: Normocephalic and atraumatic.   Eyes:      Conjunctiva/sclera: Conjunctivae normal.   Neck:      Comments: No JVD at 30 degrees  Cardiovascular:      Rate and Rhythm: Normal rate and regular rhythm.      Heart sounds: No murmur heard.  Pulmonary:      Effort: Pulmonary effort is normal. No respiratory distress.      Breath sounds: Normal breath sounds. No wheezing.   Abdominal:      General: There is no distension.      Palpations: Abdomen is soft.      Tenderness: There is no abdominal tenderness.   Musculoskeletal:      Cervical back: Normal range of motion.      Right lower leg: No edema.      Left lower leg: No edema.   Neurological:      General: No focal deficit present.      Mental Status: He is alert and oriented to person, place, and time.   Psychiatric:         Mood and Affect: Affect normal.         Laboratory  Lab Results   Component Value Date    WBC 4.46 03/31/2023    HGB 13.9 (L) 03/31/2023    HCT 42.5 03/31/2023    MCV 88 03/31/2023     03/31/2023     BMP  Lab Results   Component Value Date     10/20/2023    K 3.9 10/20/2023     10/20/2023    CO2 28 10/20/2023    BUN 13 10/20/2023    " "CREATININE 1.0 10/20/2023    CALCIUM 10.2 10/20/2023    ANIONGAP 12 10/20/2023    EGFRNORACEVR >60.0 10/20/2023     Lab Results   Component Value Date    INR 1.3 (H) 07/09/2022    INR 1.0 06/26/2018    INR 1.0 04/16/2015     Lab Results   Component Value Date    HGBA1C 5.9 (H) 10/20/2023     No results for input(s): "POCTGLUCOSE" in the last 72 hours.    Diagnostic Results:  Labs: Reviewed  Echo: Reviewed    ASSESSMENT & PLAN:     Hypertension   Reports systolic in 1teens - 120s at home. Well controlled  Continue current regimen    Encouraged blood pressure log, diet and exercise, low salt diet      Hyperlipidemia   Lab Results   Component Value Date    LDLCALC 61.6 (L) 03/31/2023   At goal  Continue atorvastatin 40mg qd  Repeat lipid panel       Paroxysmal atrial fibrillation   Asymptomatic   Follows with Dr. Trevino   [id number] Score is 3 (age, HTN), continue oral anticoagulation  On Sotalol per EP         Discussed with Dr. Clement  - staff attestation to follow      Roc Wells MD  Cardiovascular Disease Fellow PGY-5    "

## 2024-04-22 ENCOUNTER — OFFICE VISIT (OUTPATIENT)
Dept: INTERNAL MEDICINE | Facility: CLINIC | Age: 79
End: 2024-04-22
Payer: MEDICARE

## 2024-04-22 ENCOUNTER — LAB VISIT (OUTPATIENT)
Dept: LAB | Facility: HOSPITAL | Age: 79
End: 2024-04-22
Payer: MEDICARE

## 2024-04-22 VITALS
HEIGHT: 73 IN | SYSTOLIC BLOOD PRESSURE: 130 MMHG | OXYGEN SATURATION: 97 % | HEART RATE: 55 BPM | DIASTOLIC BLOOD PRESSURE: 78 MMHG | BODY MASS INDEX: 29.1 KG/M2 | WEIGHT: 219.56 LBS

## 2024-04-22 DIAGNOSIS — I70.0 ATHEROSCLEROSIS OF AORTA: ICD-10-CM

## 2024-04-22 DIAGNOSIS — E78.5 HYPERLIPIDEMIA LDL GOAL <100: ICD-10-CM

## 2024-04-22 DIAGNOSIS — E11.65 TYPE 2 DIABETES MELLITUS WITH HYPERGLYCEMIA, WITHOUT LONG-TERM CURRENT USE OF INSULIN: ICD-10-CM

## 2024-04-22 DIAGNOSIS — E79.0 HYPERURICEMIA: ICD-10-CM

## 2024-04-22 DIAGNOSIS — I48.0 PAROXYSMAL ATRIAL FIBRILLATION: ICD-10-CM

## 2024-04-22 DIAGNOSIS — I10 ESSENTIAL HYPERTENSION: ICD-10-CM

## 2024-04-22 DIAGNOSIS — Z00.00 ENCOUNTER FOR ANNUAL PHYSICAL EXAM: Primary | ICD-10-CM

## 2024-04-22 PROBLEM — I27.20 PULMONARY HYPERTENSION: Status: RESOLVED | Noted: 2017-10-09 | Resolved: 2024-04-22

## 2024-04-22 PROBLEM — Z89.422 ACQUIRED ABSENCE OF OTHER LEFT TOE(S): Status: RESOLVED | Noted: 2024-04-22 | Resolved: 2024-04-22

## 2024-04-22 PROBLEM — Z89.422 ACQUIRED ABSENCE OF OTHER LEFT TOE(S): Status: ACTIVE | Noted: 2024-04-22

## 2024-04-22 LAB
CHOLEST SERPL-MCNC: 126 MG/DL (ref 120–199)
CHOLEST/HDLC SERPL: 2.7 {RATIO} (ref 2–5)
HDLC SERPL-MCNC: 47 MG/DL (ref 40–75)
HDLC SERPL: 37.3 % (ref 20–50)
LDLC SERPL CALC-MCNC: 59.2 MG/DL (ref 63–159)
NONHDLC SERPL-MCNC: 79 MG/DL
TRIGL SERPL-MCNC: 99 MG/DL (ref 30–150)

## 2024-04-22 PROCEDURE — 1160F RVW MEDS BY RX/DR IN RCRD: CPT | Mod: CPTII,S$GLB,, | Performed by: INTERNAL MEDICINE

## 2024-04-22 PROCEDURE — 80061 LIPID PANEL: CPT | Performed by: STUDENT IN AN ORGANIZED HEALTH CARE EDUCATION/TRAINING PROGRAM

## 2024-04-22 PROCEDURE — 3288F FALL RISK ASSESSMENT DOCD: CPT | Mod: CPTII,S$GLB,, | Performed by: INTERNAL MEDICINE

## 2024-04-22 PROCEDURE — 99397 PER PM REEVAL EST PAT 65+ YR: CPT | Mod: S$GLB,,, | Performed by: INTERNAL MEDICINE

## 2024-04-22 PROCEDURE — 1126F AMNT PAIN NOTED NONE PRSNT: CPT | Mod: CPTII,S$GLB,, | Performed by: INTERNAL MEDICINE

## 2024-04-22 PROCEDURE — 36415 COLL VENOUS BLD VENIPUNCTURE: CPT | Performed by: STUDENT IN AN ORGANIZED HEALTH CARE EDUCATION/TRAINING PROGRAM

## 2024-04-22 PROCEDURE — 1101F PT FALLS ASSESS-DOCD LE1/YR: CPT | Mod: CPTII,S$GLB,, | Performed by: INTERNAL MEDICINE

## 2024-04-22 PROCEDURE — 3078F DIAST BP <80 MM HG: CPT | Mod: CPTII,S$GLB,, | Performed by: INTERNAL MEDICINE

## 2024-04-22 PROCEDURE — 1159F MED LIST DOCD IN RCRD: CPT | Mod: CPTII,S$GLB,, | Performed by: INTERNAL MEDICINE

## 2024-04-22 PROCEDURE — 3075F SYST BP GE 130 - 139MM HG: CPT | Mod: CPTII,S$GLB,, | Performed by: INTERNAL MEDICINE

## 2024-04-22 PROCEDURE — 99999 PR PBB SHADOW E&M-EST. PATIENT-LVL III: CPT | Mod: PBBFAC,,, | Performed by: INTERNAL MEDICINE

## 2024-04-22 NOTE — PROGRESS NOTES
Subjective:       Patient ID: Ezekiel Mueller is a 78 y.o. male.    Chief Complaint: Follow-up      HPI  Ezekiel Mueller is a 78 y.o. year old male with paroxysmal afib on OAC, HTN, fatty liver, hx of pancreatitis, prediabetes, hx of BCC of R eyelid, colon polyps, atherosclerosis presents for annual exam.  Patient doing well at baseline health with no new complaints.  Has been exercising by walking 3 to 4 times a week for 3 miles.    Review of Systems   Constitutional:  Negative for activity change, appetite change, chills, fatigue, fever and unexpected weight change.   HENT:  Negative for congestion, rhinorrhea and sore throat.    Eyes:  Negative for visual disturbance.   Respiratory:  Negative for shortness of breath.    Cardiovascular:  Negative for chest pain.   Gastrointestinal:  Negative for abdominal pain, diarrhea, nausea and vomiting.   Genitourinary:  Negative for difficulty urinating and dysuria.   Musculoskeletal:  Negative for arthralgias, back pain and myalgias.   Skin:  Negative for color change and rash.   Neurological:  Negative for dizziness, weakness and headaches.         Past Medical History:   Diagnosis Date    Basal cell carcinoma 02/2015    R eyelid    Basal cell carcinoma (BCC) of back 02/07/2024    right upper back    Basal cell carcinoma, arm, right 02/07/2024    right arm    Borderline diabetes 2016    Colon polyps     [facility], none on repeat    Elevated blood uric acid level 2016    Fatty liver     Hypertension     diagnosed 2005    Pancreatitis 07/2015    etiology unclear        Prior to Admission medications    Medication Sig Start Date End Date Taking? Authorizing Provider   allopurinoL (ZYLOPRIM) 100 MG tablet Take 1 tablet (100 mg total) by mouth once daily. 3/31/23   Ye Dudley MD   amLODIPine (NORVASC) 10 MG tablet Take 1 tablet (10 mg total) by mouth once daily. For blood pressure 2/19/24   Ye Dudley MD   atorvastatin (LIPITOR) 40 MG tablet Take 1  tablet (40 mg total) by mouth once daily. 4/1/24 4/1/25  Ye Dudley MD   blood sugar diagnostic Strp 1 strip by Misc.(Non-Drug; Combo Route) route once daily. 2/20/18   Joshua Leija MD   blood-glucose meter Misc 1 kit by Misc.(Non-Drug; Combo Route) route 3 (three) times daily. 2/20/18 9/20/21  Joshua Leija MD   cholecalciferol, vitamin D3, (VITAMIN D3) 50 mcg (2,000 unit) Cap Take 1 capsule by mouth once daily.    Provider, Historical   diclofenac sodium (VOLTAREN) 1 % Gel Apply 2 g topically once daily. 7/15/22   Joslyn Leija FNP   erythromycin (ROMYCIN) ophthalmic ointment Place into the left eye every evening. For 5 days for infection in left eye 7/14/21   Tiffany Figueredo, NEDRA   hydroCHLOROthiazide (HYDRODIURIL) 25 MG tablet Take 1 tablet (25 mg total) by mouth once daily. 1/2/24   Ye Dudley MD   lancing device (BD LANCET DEVICE) Misc 1 Units by Misc.(Non-Drug; Combo Route) route once daily. Please dispense lancets and strips - # 100 2/19/18 9/20/21  Joshua Leija MD   losartan (COZAAR) 25 MG tablet TAKE 1/2 TABLET ONE TIME DAILY 3/19/24   Ye Dudley MD   metFORMIN (GLUCOPHAGE) 500 MG tablet TAKE 1 TABLET TWICE DAILY WITH MEALS 2/9/24   Ye Dudley MD   multivitamin with minerals tablet Take 1 tablet by mouth once daily.    Provider, Historical   mupirocin (BACTROBAN) 2 % ointment Use qd for lesion on right arm 10/12/23   Kylie Alcaraz MD   naproxen (NAPROSYN) 375 MG tablet Take 1 tablet (375 mg total) by mouth 2 (two) times daily with meals. 7/20/22   Van Nova Jr., MD   omega-3 fatty acids/fish oil (FISH OIL-OMEGA-3 FATTY ACIDS) 300-1,000 mg capsule Take by mouth once daily.    Provider, Historical   ondansetron (ZOFRAN) 4 MG tablet Take 1 tablet (4 mg total) by mouth every 6 (six) hours. 7/9/22   Halina Goldsmith MD   prednisolon/gatiflox/bromfenac (PREDNISOL ACE-GATIFLOX-BROMFEN) 1-0.5-0.075 % DrpS Apply 1 drop to eye 3 (three) times daily. in operative eye for 1  "month after surgery 1/30/24   Tj Lopez MD   rivaroxaban (XARELTO) 20 mg Tab Take 1 tablet (20 mg total) by mouth Daily. 4/15/24   Fermín Trevino MD   sotaloL (BETAPACE) 80 MG tablet Take 1 tablet (80 mg total) by mouth 2 (two) times daily. First dose on Wednesday Feb 2 in AM 10/12/23 10/11/24  MARC Olson MD   TRUEPLUS LANCETS 33 gauge Misc USE AS DIRECTED THREE TIMES DAILY AS NEEDED 12/12/18   Joshua Leija MD   vits A,C,E/lutein/minerals (HEALTHY EYES ORAL) Take by mouth once daily.    Provider, Historical        Past medical history, surgical history, and family medical history reviewed and updated as appropriate.    Medications and allergies reviewed.     Objective:          Vitals:    04/22/24 0828   BP: 130/78   BP Location: Right arm   Patient Position: Sitting   Pulse: (!) 55   SpO2: 97%   Weight: 99.6 kg (219 lb 9.3 oz)   Height: 6' 1" (1.854 m)     Body mass index is 28.97 kg/m².  Physical Exam  Constitutional:       General: He is not in acute distress.     Appearance: He is well-developed.   HENT:      Head: Normocephalic and atraumatic.      Nose: Nose normal.   Eyes:      General: No scleral icterus.     Extraocular Movements: Extraocular movements intact.   Neck:      Thyroid: No thyromegaly.      Vascular: No JVD.      Trachea: No tracheal deviation.      Comments: Kyphosis  Cardiovascular:      Rate and Rhythm: Normal rate and regular rhythm.      Heart sounds: Normal heart sounds. No murmur heard.     No friction rub. No gallop.   Pulmonary:      Effort: Pulmonary effort is normal. No respiratory distress.      Breath sounds: Normal breath sounds. No wheezing or rales.   Abdominal:      General: Bowel sounds are normal. There is no distension.      Palpations: Abdomen is soft. There is no mass.      Tenderness: There is no abdominal tenderness.   Musculoskeletal:         General: No tenderness. Normal range of motion.      Cervical back: Normal range of motion and neck supple. "      Comments: Nodular calcifications in right elbow from history of prior trauma in the 1970s.   Lymphadenopathy:      Cervical: No cervical adenopathy.   Skin:     General: Skin is warm and dry.      Findings: No rash.   Neurological:      Mental Status: He is alert and oriented to person, place, and time.      Cranial Nerves: No cranial nerve deficit.      Deep Tendon Reflexes: Reflexes normal.   Psychiatric:         Behavior: Behavior normal.         Lab Results   Component Value Date    WBC 4.46 03/31/2023    HGB 13.9 (L) 03/31/2023    HCT 42.5 03/31/2023     03/31/2023    CHOL 125 03/31/2023    TRIG 112 03/31/2023    HDL 41 03/31/2023    ALT 30 03/31/2023    AST 24 03/31/2023     10/20/2023    K 3.9 10/20/2023     10/20/2023    CREATININE 1.0 10/20/2023    BUN 13 10/20/2023    CO2 28 10/20/2023    TSH 3.267 10/20/2023    PSA 0.80 04/23/2021    INR 1.3 (H) 07/09/2022    HGBA1C 5.9 (H) 10/20/2023       Assessment:       1. Encounter for annual physical exam    2. Paroxysmal atrial fibrillation    3. Type 2 diabetes mellitus with hyperglycemia, without long-term current use of insulin    4. Essential hypertension    5. Hyperlipidemia LDL goal <100    6. Atherosclerosis of aorta    7. Hyperuricemia          Plan:     Ezekiel was seen today for follow-up.    Diagnoses and all orders for this visit:    Encounter for annual physical exam    Paroxysmal atrial fibrillation  Comments:  On Xarelto daily.  On sotalol.  Follows with [facility].  No changes to current management.    Type 2 diabetes mellitus with hyperglycemia, without long-term current use of insulin  Comments:  Patient on metformin, well-controlled.  Renal function stable.  We will recheck A1c.  Orders:  -     HEMOGLOBIN A1C; Future    Essential hypertension  Comments:  Controlled, no changes to current management.  Orders:  -     CBC W/ AUTO DIFFERENTIAL; Future  -     COMPREHENSIVE METABOLIC PANEL; Future    Hyperlipidemia LDL goal  <100  Comments:  At goal.  No changes to current management.    Atherosclerosis of aorta  Comments:  Seen on previous imaging.  Blood pressure controlled, cholesterol controlled.    Hyperuricemia  -     URIC ACID; Future    Asymptomatic hyperuricemia.  No gout flares.  We will check uric acid.  Patient has not been taking allopurinol despite being prescribed last year.  We will discontinue given no benefits noted.  No history renal calculi, no history of gout flares.    Health maintenance reviewed with patient.     Follow up in about 6 months (around 10/22/2024).    Ye Dudley MD  Internal Medicine / Primary Care  [facility]  4/22/2024

## 2024-04-29 ENCOUNTER — PATIENT MESSAGE (OUTPATIENT)
Dept: CARDIOLOGY | Facility: CLINIC | Age: 79
End: 2024-04-29
Payer: MEDICARE

## 2024-04-29 ENCOUNTER — TELEPHONE (OUTPATIENT)
Dept: CARDIOLOGY | Facility: CLINIC | Age: 79
End: 2024-04-29
Payer: MEDICARE

## 2024-05-06 DIAGNOSIS — R73.03 BORDERLINE DIABETES MELLITUS: ICD-10-CM

## 2024-05-06 RX ORDER — METFORMIN HYDROCHLORIDE 500 MG/1
500 TABLET ORAL 2 TIMES DAILY WITH MEALS
Qty: 180 TABLET | Refills: 1 | Status: SHIPPED | OUTPATIENT
Start: 2024-05-06

## 2024-05-06 NOTE — TELEPHONE ENCOUNTER
Refill Decision Note   Ezekiel Ervin  is requesting a refill authorization.  Brief Assessment and Rationale for Refill:  Approve     Medication Therapy Plan:         Pharmacist review requested: Yes   Comments:     Note composed:11:56 AM 05/06/2024

## 2024-05-06 NOTE — PROGRESS NOTES
I have reviewed the patient's chart and the fellow's clinic note, as well as discussed the case with the fellow. I have personally spoken with and examined the patient in the clinic. I agree with the findings, assessment, and plan.      Ignacio Clement MD  Consultative Cardiology - [facility]  .

## 2024-05-06 NOTE — TELEPHONE ENCOUNTER
Refill Routing Note   Medication(s) are not appropriate for processing by [facility] for the following reason(s):        Drug-disease interaction    [facility] action(s):  Defer      Medication Therapy Plan: Fatty liver; Early hepatic fibrosis    Pharmacist review requested: Yes     Appointments  past 12m or future 3m with PCP    Date Provider   Last Visit   4/22/2024 Ye Dudley MD   Next Visit   10/25/2024 Ye Dudley MD   ED visits in past 90 days: 0        Note composed:11:10 AM 05/06/2024

## 2024-05-06 NOTE — TELEPHONE ENCOUNTER
No care due was identified.  [facility] Embedded Care Due Messages. Reference number: [id number].   5/06/2024 10:42:14 AM CDT

## 2024-05-09 ENCOUNTER — OFFICE VISIT (OUTPATIENT)
Dept: INTERNAL MEDICINE | Facility: CLINIC | Age: 79
End: 2024-05-09
Payer: MEDICARE

## 2024-05-09 VITALS
BODY MASS INDEX: 29.08 KG/M2 | HEART RATE: 72 BPM | DIASTOLIC BLOOD PRESSURE: 68 MMHG | SYSTOLIC BLOOD PRESSURE: 126 MMHG | WEIGHT: 219.38 LBS | OXYGEN SATURATION: 96 % | HEIGHT: 73 IN

## 2024-05-09 DIAGNOSIS — I10 ESSENTIAL HYPERTENSION: ICD-10-CM

## 2024-05-09 DIAGNOSIS — R73.01 IMPAIRED FASTING BLOOD SUGAR: ICD-10-CM

## 2024-05-09 DIAGNOSIS — E78.5 HYPERLIPIDEMIA LDL GOAL <100: ICD-10-CM

## 2024-05-09 DIAGNOSIS — I48.0 PAROXYSMAL ATRIAL FIBRILLATION: ICD-10-CM

## 2024-05-09 DIAGNOSIS — E79.0 HYPERURICEMIA: Primary | ICD-10-CM

## 2024-05-09 DIAGNOSIS — M10.9 ACUTE GOUT OF LEFT FOOT, UNSPECIFIED CAUSE: ICD-10-CM

## 2024-05-09 PROCEDURE — 1159F MED LIST DOCD IN RCRD: CPT | Mod: HCNC,CPTII,S$GLB, | Performed by: NURSE PRACTITIONER

## 2024-05-09 PROCEDURE — 3288F FALL RISK ASSESSMENT DOCD: CPT | Mod: HCNC,CPTII,S$GLB, | Performed by: NURSE PRACTITIONER

## 2024-05-09 PROCEDURE — 99999 PR PBB SHADOW E&M-EST. PATIENT-LVL IV: CPT | Mod: PBBFAC,HCNC,, | Performed by: NURSE PRACTITIONER

## 2024-05-09 PROCEDURE — 99214 OFFICE O/P EST MOD 30 MIN: CPT | Mod: HCNC,S$GLB,, | Performed by: NURSE PRACTITIONER

## 2024-05-09 PROCEDURE — 1125F AMNT PAIN NOTED PAIN PRSNT: CPT | Mod: HCNC,CPTII,S$GLB, | Performed by: NURSE PRACTITIONER

## 2024-05-09 PROCEDURE — 3078F DIAST BP <80 MM HG: CPT | Mod: HCNC,CPTII,S$GLB, | Performed by: NURSE PRACTITIONER

## 2024-05-09 PROCEDURE — 1101F PT FALLS ASSESS-DOCD LE1/YR: CPT | Mod: HCNC,CPTII,S$GLB, | Performed by: NURSE PRACTITIONER

## 2024-05-09 PROCEDURE — 3074F SYST BP LT 130 MM HG: CPT | Mod: HCNC,CPTII,S$GLB, | Performed by: NURSE PRACTITIONER

## 2024-05-09 RX ORDER — ALLOPURINOL 100 MG/1
100 TABLET ORAL DAILY
Qty: 30 TABLET | Refills: 11 | Status: SHIPPED | OUTPATIENT
Start: 2024-05-09 | End: 2024-05-09 | Stop reason: SDUPTHER

## 2024-05-09 RX ORDER — ALLOPURINOL 100 MG/1
100 TABLET ORAL DAILY
Qty: 90 TABLET | Refills: 2 | Status: SHIPPED | OUTPATIENT
Start: 2024-05-09

## 2024-05-09 RX ORDER — PREDNISONE 20 MG/1
20 TABLET ORAL DAILY
Qty: 5 TABLET | Refills: 0 | Status: SHIPPED | OUTPATIENT
Start: 2024-05-09

## 2024-05-09 RX ORDER — COLCHICINE 0.6 MG/1
0.6 TABLET ORAL DAILY
Qty: 5 TABLET | Refills: 0 | Status: SHIPPED | OUTPATIENT
Start: 2024-05-09 | End: 2024-05-14

## 2024-05-09 NOTE — PROGRESS NOTES
INTERNAL MEDICINE URGENT CARE NOTE    CHIEF COMPLAINT     Chief Complaint   Patient presents with    Foot Pain     Left x4 days        HPI     Ezekiel Mueller is a 78 y.o. male with Pafib, HLD, HTN, fatty liver, and gout who presents for an urgent  visit today.    He is an established pt of Dr Dudley - last seen 4/22/2024    Had h/o of asymptomatic hyperuricemia- was prescribed allopurinol but was not taking. No previous flares.    Started to have left great toe pain on Monday 5/6/2024. +redness, swelling and TTP   Had crawfish boil a couple of days prior           Past Medical History:  Past Medical History:   Diagnosis Date    Basal cell carcinoma 02/2015    R eyelid    Basal cell carcinoma (BCC) of back 02/07/2024    right upper back    Basal cell carcinoma, arm, right 02/07/2024    right arm    Borderline diabetes 2016    Colon polyps     [facility], none on repeat    Elevated blood uric acid level 2016    Fatty liver     Hypertension     diagnosed 2005    Pancreatitis 07/2015    etiology unclear       Home Medications:  Prior to Admission medications    Medication Sig Start Date End Date Taking? Authorizing Provider   amLODIPine (NORVASC) 10 MG tablet Take 1 tablet (10 mg total) by mouth once daily. For blood pressure 2/19/24  Yes Ye Dudley MD   atorvastatin (LIPITOR) 40 MG tablet Take 1 tablet (40 mg total) by mouth once daily. 4/1/24 4/1/25 Yes Ye Dudley MD   cholecalciferol, vitamin D3, (VITAMIN D3) 50 mcg (2,000 unit) Cap Take 1 capsule by mouth once daily.   Yes Provider, Historical   hydroCHLOROthiazide (HYDRODIURIL) 25 MG tablet Take 1 tablet (25 mg total) by mouth once daily. 1/2/24  Yes Ye Dudley MD   losartan (COZAAR) 25 MG tablet TAKE 1/2 TABLET ONE TIME DAILY 3/19/24  Yes Ye Dudley MD   metFORMIN (GLUCOPHAGE) 500 MG tablet TAKE 1 TABLET TWICE DAILY WITH MEALS 5/6/24  Yes Ye Dudley MD   omega-3 fatty acids/fish oil (FISH OIL-OMEGA-3 FATTY ACIDS) 300-1,000 mg  "capsule Take by mouth once daily.   Yes Provider, Historical   rivaroxaban (XARELTO) 20 mg Tab Take 1 tablet (20 mg total) by mouth Daily. 4/15/24  Yes Fermín Trevino MD   sotaloL (BETAPACE) 80 MG tablet Take 1 tablet (80 mg total) by mouth 2 (two) times daily. First dose on Wednesday Feb 2 in AM 10/12/23 10/11/24 Yes MARC Olson MD   multivitamin with minerals tablet Take 1 tablet by mouth once daily.    Provider, Historical   mupirocin (BACTROBAN) 2 % ointment Use qd for lesion on right arm 10/12/23   Kylie Alcaraz MD   prednisolon/gatiflox/bromfenac (PREDNISOL ACE-GATIFLOX-BROMFEN) 1-0.5-0.075 % DrpS Apply 1 drop to eye 3 (three) times daily. in operative eye for 1 month after surgery 1/30/24   Tj Lopez MD   vits A,C,E/lutein/minerals (HEALTHY EYES ORAL) Take by mouth once daily.    Provider, Historical       Review of Systems:  Review of Systems   Constitutional:  Negative for chills and fever.   Respiratory:  Negative for cough, shortness of breath and wheezing.    Cardiovascular:  Negative for chest pain and palpitations.   Musculoskeletal:  Positive for arthralgias and joint swelling.   Neurological:  Negative for dizziness, light-headedness and headaches.       Health Maintainence:   Immunizations:  Health Maintenance         Date Due Completion Date    RSV Vaccine (Age 60+ and Pregnant patients) (1 - 1-dose 60+ series) Never done ---    COVID-19 Vaccine (3 - Moderna risk series) 07/06/2021 6/8/2021    Diabetes Urine Screening 10/20/2024 10/20/2023    Hemoglobin A1c 10/22/2024 4/22/2024    Eye Exam 01/30/2025 1/30/2024    Lipid Panel 04/22/2025 4/22/2024    TETANUS VACCINE 05/13/2029 5/13/2019             PHYSICAL EXAM     /68 (BP Location: Left arm, Patient Position: Sitting, BP Method: Medium (Manual))   Pulse 72   Ht 6' 1" (1.854 m)   Wt 99.5 kg (219 lb 5.7 oz)   SpO2 96%   BMI 28.94 kg/m²     Physical Exam  Vitals reviewed.   Constitutional:       Appearance: He is " well-developed.   HENT:      Head: Normocephalic and atraumatic.   Eyes:      Pupils: Pupils are equal, round, and reactive to light.   Cardiovascular:      Rate and Rhythm: Normal rate and regular rhythm.   Pulmonary:      Effort: Pulmonary effort is normal.   Neurological:      Mental Status: He is alert and oriented to person, place, and time.         LABS     Lab Results   Component Value Date    HGBA1C 5.9 (H) 04/22/2024     CMP  Sodium   Date Value Ref Range Status   04/22/2024 141 136 - 145 mmol/L Final     Potassium   Date Value Ref Range Status   04/22/2024 4.3 3.5 - 5.1 mmol/L Final     Chloride   Date Value Ref Range Status   04/22/2024 101 95 - 110 mmol/L Final     CO2   Date Value Ref Range Status   04/22/2024 30 (H) 23 - 29 mmol/L Final     Glucose   Date Value Ref Range Status   04/22/2024 114 (H) 70 - 110 mg/dL Final     BUN   Date Value Ref Range Status   04/22/2024 15 8 - 23 mg/dL Final     Creatinine   Date Value Ref Range Status   04/22/2024 1.0 0.5 - 1.4 mg/dL Final     Calcium   Date Value Ref Range Status   04/22/2024 10.4 8.7 - 10.5 mg/dL Final     Total Protein   Date Value Ref Range Status   04/22/2024 8.1 6.0 - 8.4 g/dL Final     Albumin   Date Value Ref Range Status   04/22/2024 4.5 3.5 - 5.2 g/dL Final     Total Bilirubin   Date Value Ref Range Status   04/22/2024 0.8 0.1 - 1.0 mg/dL Final     Comment:     For infants and newborns, interpretation of results should be based  on gestational age, weight and in agreement with clinical  observations.    Premature Infant recommended reference ranges:  Up to 24 hours.............<8.0 mg/dL  Up to 48 hours............<12.0 mg/dL  3-5 days..................<15.0 mg/dL  6-29 days.................<15.0 mg/dL       Alkaline Phosphatase   Date Value Ref Range Status   04/22/2024 48 (L) 55 - 135 U/L Final     AST   Date Value Ref Range Status   04/22/2024 23 10 - 40 U/L Final     ALT   Date Value Ref Range Status   04/22/2024 29 10 - 44 U/L Final      Anion Gap   Date Value Ref Range Status   04/22/2024 10 8 - 16 mmol/L Final     eGFR if    Date Value Ref Range Status   07/20/2022 >60.0 >60 mL/min/1.73 m^2 Final     eGFR if non    Date Value Ref Range Status   07/20/2022 >60.0 >60 mL/min/1.73 m^2 Final     Comment:     Calculation used to obtain the estimated glomerular filtration  rate (eGFR) is the CKD-EPI equation.        Lab Results   Component Value Date    WBC 7.03 04/22/2024    HGB 14.9 04/22/2024    HCT 46.2 04/22/2024    MCV 88 04/22/2024     04/22/2024     Lab Results   Component Value Date    CHOL 126 04/22/2024    CHOL 125 03/31/2023    CHOL 206 (H) 09/20/2021     Lab Results   Component Value Date    HDL 47 04/22/2024    HDL 41 03/31/2023    HDL 51 09/20/2021     Lab Results   Component Value Date    LDLCALC 59.2 (L) 04/22/2024    LDLCALC 61.6 (L) 03/31/2023    LDLCALC 119.8 09/20/2021     Lab Results   Component Value Date    TRIG 99 04/22/2024    TRIG 112 03/31/2023    TRIG 176 (H) 09/20/2021     Lab Results   Component Value Date    CHOLHDL 37.3 04/22/2024    CHOLHDL 32.8 03/31/2023    CHOLHDL 24.8 09/20/2021     Lab Results   Component Value Date    TSH 3.267 10/20/2023       ASSESSMENT/PLAN     Ezekiel Mueller is a 78 y.o. male     Hyperuricemia- start prednisone and colchicine for acute flair and start allopurinol once flare has resolved.   -     predniSONE (DELTASONE) 20 MG tablet; Take 1 tablet (20 mg total) by mouth once daily.  Dispense: 5 tablet; Refill: 0  -     colchicine (COLCRYS) 0.6 mg tablet; Take 1 tablet (0.6 mg total) by mouth once daily. for 5 days  Dispense: 5 tablet; Refill: 0  -     Discontinue: allopurinoL (ZYLOPRIM) 100 MG tablet; Take 1 tablet (100 mg total) by mouth once daily.  Dispense: 30 tablet; Refill: 11  -     allopurinoL (ZYLOPRIM) 100 MG tablet; Take 1 tablet (100 mg total) by mouth once daily.  Dispense: 90 tablet; Refill: 2    Acute gout of left foot, unspecified cause-  start prednisone and colchicine for acute flair and start allopurinol once flare has resolved.   -     predniSONE (DELTASONE) 20 MG tablet; Take 1 tablet (20 mg total) by mouth once daily.  Dispense: 5 tablet; Refill: 0  -     colchicine (COLCRYS) 0.6 mg tablet; Take 1 tablet (0.6 mg total) by mouth once daily. for 5 days  Dispense: 5 tablet; Refill: 0    Essential hypertension    Paroxysmal atrial fibrillation    Hyperlipidemia LDL goal <100    Impaired fasting blood sugar           Follow up with PCP    Patient education provided from [facility]. Patient was counseled on when and how to seek emergent care.       Meredith DELUCA, MONI, FNP-c   Department of Internal Medicine - [facility]  9:08 AM

## 2024-05-10 PROBLEM — K76.0 FATTY LIVER: Chronic | Status: ACTIVE | Noted: 2019-03-31

## 2024-05-16 ENCOUNTER — OFFICE VISIT (OUTPATIENT)
Dept: OPTOMETRY | Facility: CLINIC | Age: 79
End: 2024-05-16
Payer: MEDICARE

## 2024-05-16 DIAGNOSIS — Z98.41 STATUS POST CATARACT EXTRACTION AND INSERTION OF INTRAOCULAR LENS OF RIGHT EYE: Primary | ICD-10-CM

## 2024-05-16 DIAGNOSIS — H16.042 MARGINAL KERATITIS OF LEFT EYE DUE TO STAPHYLOCOCCUS TOXIN: ICD-10-CM

## 2024-05-16 DIAGNOSIS — H16.212 EXPOSURE KERATOPATHY, LEFT: ICD-10-CM

## 2024-05-16 DIAGNOSIS — B95.8 MARGINAL KERATITIS OF LEFT EYE DUE TO STAPHYLOCOCCUS TOXIN: ICD-10-CM

## 2024-05-16 DIAGNOSIS — Z96.1 STATUS POST CATARACT EXTRACTION AND INSERTION OF INTRAOCULAR LENS OF RIGHT EYE: Primary | ICD-10-CM

## 2024-05-16 PROCEDURE — 3288F FALL RISK ASSESSMENT DOCD: CPT | Mod: CPTII,S$GLB,, | Performed by: OPTOMETRIST

## 2024-05-16 PROCEDURE — 1126F AMNT PAIN NOTED NONE PRSNT: CPT | Mod: CPTII,S$GLB,, | Performed by: OPTOMETRIST

## 2024-05-16 PROCEDURE — 99999 PR PBB SHADOW E&M-EST. PATIENT-LVL II: CPT | Mod: PBBFAC,,, | Performed by: OPTOMETRIST

## 2024-05-16 PROCEDURE — 99024 POSTOP FOLLOW-UP VISIT: CPT | Mod: S$GLB,,, | Performed by: OPTOMETRIST

## 2024-05-16 PROCEDURE — 1101F PT FALLS ASSESS-DOCD LE1/YR: CPT | Mod: CPTII,S$GLB,, | Performed by: OPTOMETRIST

## 2024-05-16 RX ORDER — NEOMYCIN SULFATE, POLYMYXIN B SULFATE, AND DEXAMETHASONE 3.5; 10000; 1 MG/G; [USP'U]/G; MG/G
OINTMENT OPHTHALMIC NIGHTLY
Qty: 3.5 G | Refills: 3 | Status: SHIPPED | OUTPATIENT
Start: 2024-05-16 | End: 2024-06-15

## 2024-06-10 ENCOUNTER — PATIENT MESSAGE (OUTPATIENT)
Dept: INTERNAL MEDICINE | Facility: CLINIC | Age: 79
End: 2024-06-10
Payer: MEDICARE

## 2024-08-02 DIAGNOSIS — I10 ESSENTIAL HYPERTENSION: ICD-10-CM

## 2024-08-02 DIAGNOSIS — E79.0 HYPERURICEMIA: ICD-10-CM

## 2024-08-02 RX ORDER — ALLOPURINOL 100 MG/1
100 TABLET ORAL DAILY
Qty: 90 TABLET | Refills: 2 | Status: SHIPPED | OUTPATIENT
Start: 2024-08-02

## 2024-08-02 RX ORDER — AMLODIPINE BESYLATE 10 MG/1
10 TABLET ORAL DAILY
Qty: 90 TABLET | Refills: 2 | Status: SHIPPED | OUTPATIENT
Start: 2024-08-02

## 2024-08-02 NOTE — TELEPHONE ENCOUNTER
Care Due:                  Date            Visit Type   Department     Provider  --------------------------------------------------------------------------------                                EP -                              PRIMARY      [facility] INTERNAL  Last Visit: 04-      CARE ([facility])   KATHY Dudley                              [facility]                              PRIMARY      [facility] INTERNAL  Next Visit: 10-      CARE ([facility])   KATHY Dudley                                                            Last  Test          Frequency    Reason                     Performed    Due Date  --------------------------------------------------------------------------------    HBA1C.......  6 months...  metFORMIN................  04-   10-    [employer] Embedded Care Due Messages. Reference number: [id number].   8/02/2024 6:33:53 AM CDT

## 2024-08-02 NOTE — TELEPHONE ENCOUNTER
Provider Staff:  Action required for this patient    Requires labs      Please see care gap opportunities below in Care Due Message.    Thanks!  [facility]     Appointments      Date Provider   Last Visit   4/22/2024 Ye Dudley MD   Next Visit   10/25/2024 Ye Dudley MD     Refill Decision Note   Ezekiel Mueller  is requesting a refill authorization.  Brief Assessment and Rationale for Refill:  Approve     Medication Therapy Plan:         Comments:     Note composed:12:56 PM 08/02/2024

## 2024-08-05 ENCOUNTER — TELEPHONE (OUTPATIENT)
Dept: ELECTROPHYSIOLOGY | Facility: CLINIC | Age: 79
End: 2024-08-05
Payer: MEDICARE

## 2024-08-05 DIAGNOSIS — I48.0 PAROXYSMAL ATRIAL FIBRILLATION: Primary | ICD-10-CM

## 2024-08-06 RX ORDER — SOTALOL HYDROCHLORIDE 80 MG/1
80 TABLET ORAL 2 TIMES DAILY
Qty: 180 TABLET | Refills: 3 | Status: SHIPPED | OUTPATIENT
Start: 2024-08-06

## 2024-08-10 ENCOUNTER — OFFICE VISIT (OUTPATIENT)
Dept: URGENT CARE | Facility: CLINIC | Age: 79
End: 2024-08-10
Payer: MEDICARE

## 2024-08-10 VITALS
HEART RATE: 62 BPM | RESPIRATION RATE: 18 BRPM | DIASTOLIC BLOOD PRESSURE: 79 MMHG | BODY MASS INDEX: 29.03 KG/M2 | WEIGHT: 219 LBS | OXYGEN SATURATION: 98 % | SYSTOLIC BLOOD PRESSURE: 138 MMHG | TEMPERATURE: 99 F | HEIGHT: 73 IN

## 2024-08-10 DIAGNOSIS — M10.9 ACUTE GOUT INVOLVING TOE OF LEFT FOOT, UNSPECIFIED CAUSE: Primary | ICD-10-CM

## 2024-08-10 DIAGNOSIS — M79.675 GREAT TOE PAIN, LEFT: ICD-10-CM

## 2024-08-10 LAB
BASOPHILS # BLD AUTO: 0.07 K/UL (ref 0–0.2)
BASOPHILS NFR BLD: 0.9 % (ref 0–1.9)
CRP SERPL-MCNC: 12.3 MG/L (ref 0–8.2)
DIFFERENTIAL METHOD BLD: ABNORMAL
EOSINOPHIL # BLD AUTO: 0.3 K/UL (ref 0–0.5)
EOSINOPHIL NFR BLD: 3.4 % (ref 0–8)
ERYTHROCYTE [DISTWIDTH] IN BLOOD BY AUTOMATED COUNT: 13.5 % (ref 11.5–14.5)
HCT VFR BLD AUTO: 41.2 % (ref 40–54)
HGB BLD-MCNC: 13.4 G/DL (ref 14–18)
IMM GRANULOCYTES # BLD AUTO: 0.03 K/UL (ref 0–0.04)
IMM GRANULOCYTES NFR BLD AUTO: 0.4 % (ref 0–0.5)
LYMPHOCYTES # BLD AUTO: 1.9 K/UL (ref 1–4.8)
LYMPHOCYTES NFR BLD: 23.4 % (ref 18–48)
MCH RBC QN AUTO: 28 PG (ref 27–31)
MCHC RBC AUTO-ENTMCNC: 32.5 G/DL (ref 32–36)
MCV RBC AUTO: 86 FL (ref 82–98)
MONOCYTES # BLD AUTO: 0.9 K/UL (ref 0.3–1)
MONOCYTES NFR BLD: 10.7 % (ref 4–15)
NEUTROPHILS # BLD AUTO: 4.9 K/UL (ref 1.8–7.7)
NEUTROPHILS NFR BLD: 61.2 % (ref 38–73)
NRBC BLD-RTO: 0 /100 WBC
PLATELET # BLD AUTO: 210 K/UL (ref 150–450)
PMV BLD AUTO: 12.7 FL (ref 9.2–12.9)
RBC # BLD AUTO: 4.78 M/UL (ref 4.6–6.2)
URATE SERPL-MCNC: 6.4 MG/DL (ref 3.4–7)
WBC # BLD AUTO: 7.95 K/UL (ref 3.9–12.7)

## 2024-08-10 PROCEDURE — 84550 ASSAY OF BLOOD/URIC ACID: CPT | Mod: HCNC

## 2024-08-10 PROCEDURE — 73660 X-RAY EXAM OF TOE(S): CPT | Mod: RT,S$GLB,, | Performed by: RADIOLOGY

## 2024-08-10 PROCEDURE — 99214 OFFICE O/P EST MOD 30 MIN: CPT | Mod: 25,,,

## 2024-08-10 PROCEDURE — 85025 COMPLETE CBC W/AUTO DIFF WBC: CPT | Mod: HCNC

## 2024-08-10 PROCEDURE — 96372 THER/PROPH/DIAG INJ SC/IM: CPT | Mod: ,,,

## 2024-08-10 PROCEDURE — 86140 C-REACTIVE PROTEIN: CPT | Mod: HCNC

## 2024-08-10 RX ORDER — DEXAMETHASONE SODIUM PHOSPHATE 10 MG/ML
10 INJECTION INTRAMUSCULAR; INTRAVENOUS
Status: COMPLETED | OUTPATIENT
Start: 2024-08-10 | End: 2024-08-10

## 2024-08-10 RX ORDER — COLCHICINE 0.6 MG/1
0.6 TABLET ORAL 2 TIMES DAILY
Qty: 6 TABLET | Refills: 0 | Status: SHIPPED | OUTPATIENT
Start: 2024-08-10 | End: 2024-08-13

## 2024-08-10 RX ORDER — PREDNISONE 20 MG/1
TABLET ORAL
Qty: 8 TABLET | Refills: 0 | Status: SHIPPED | OUTPATIENT
Start: 2024-08-10 | End: 2024-08-15

## 2024-08-10 RX ORDER — KETOROLAC TROMETHAMINE 30 MG/ML
30 INJECTION, SOLUTION INTRAMUSCULAR; INTRAVENOUS
Status: COMPLETED | OUTPATIENT
Start: 2024-08-10 | End: 2024-08-10

## 2024-08-10 RX ADMIN — DEXAMETHASONE SODIUM PHOSPHATE 10 MG: 10 INJECTION INTRAMUSCULAR; INTRAVENOUS at 04:08

## 2024-08-10 RX ADMIN — KETOROLAC TROMETHAMINE 30 MG: 30 INJECTION, SOLUTION INTRAMUSCULAR; INTRAVENOUS at 04:08

## 2024-08-10 NOTE — PROGRESS NOTES
"Subjective:      Patient ID: Ezekiel Mueller is a 78 y.o. male.    Vitals:  height is 6' 1" (1.854 m) and weight is 99.3 kg (219 lb). His tympanic temperature is 98.7 °F (37.1 °C). His blood pressure is 138/79 and his pulse is 62. His respiration is 18 and oxygen saturation is 98%.     Chief Complaint: Foot Swelling    Pt states that he stepped off a curb about 1 week ago and tweaked his left big toe and now is having pain and swelling . Pain level is 10 and taking aleve today.  Patient states that he has had discomfort since stepping off the curb, however over the past 2 days he has had increased swelling to his big toe, the swelling is now spreading to the dorsal aspect of his foot.  He also has significant redness/bruising at the base of his big toe.  He does state he has a history of gout, states he has had 1 gout flare in the past, wife is concerned that these symptoms could be due to gout.  He denies any fever, numbness or tingling of the foot, swelling of his leg, or other associated symptoms.     Foot Injury   The incident occurred more than 1 week ago. The incident occurred at home. The injury mechanism was a twisting injury. The pain is present in the left foot and left toes. The quality of the pain is described as aching. The pain is at a severity of 10/10. The pain is severe. The pain has been Constant since onset. Associated symptoms include an inability to bear weight. He reports no foreign bodies present. The symptoms are aggravated by movement and weight bearing. He has tried NSAIDs for the symptoms. The treatment provided mild relief.       Constitution: Negative for fever and generalized weakness.   HENT:  Negative for ear pain, sinus pain and sore throat.    Neck: Negative for neck pain.   Cardiovascular:  Negative for chest pain.   Respiratory:  Negative for cough and shortness of breath.    Gastrointestinal:  Negative for abdominal pain.   Musculoskeletal:  Positive for pain, joint pain (Left " great toe), joint swelling (Left great toe) and gout.   Neurological:  Negative for headaches.      Objective:     Physical Exam   Constitutional: He is oriented to person, place, and time. He appears well-developed. He is cooperative.   HENT:   Head: Normocephalic and atraumatic.   Ears:   Right Ear: Hearing, tympanic membrane, external ear and ear canal normal.   Left Ear: Hearing, tympanic membrane, external ear and ear canal normal.   Nose: Nose normal. No mucosal edema or nasal deformity. No epistaxis. Right sinus exhibits no maxillary sinus tenderness and no frontal sinus tenderness. Left sinus exhibits no maxillary sinus tenderness and no frontal sinus tenderness.   Mouth/Throat: Uvula is midline, oropharynx is clear and moist and mucous membranes are normal. No trismus in the jaw. Normal dentition. No uvula swelling.   Eyes: Conjunctivae and lids are normal.   Neck: Trachea normal and phonation normal. Neck supple.   Cardiovascular: Normal rate, regular rhythm, normal heart sounds and normal pulses.   Pulmonary/Chest: Effort normal and breath sounds normal.   Abdominal: Normal appearance and bowel sounds are normal. Soft.   Musculoskeletal:        Legs:       Right foot: Normal.      Left foot: Decreased range of motion. Normal capillary refill. Tenderness, bony tenderness (1st MTP) and swelling (Left great toe and dorsal aspect of left foot) present.      Comments: Redness and warmth around the 1st MTP joint of left great.  Generalized swelling to the dorsal aspect of left foot without redness or warmth.  Focal TTP to first MTP joint of left great toe.  Absent second left toe   Neurological: He is alert and oriented to person, place, and time. He exhibits normal muscle tone.   Skin: Skin is warm, dry and intact.   Psychiatric: His speech is normal and behavior is normal. Judgment and thought content normal.   Nursing note and vitals reviewed.      Assessment:     1. Acute gout involving toe of left foot,  unspecified cause    2. Great toe pain, left        Plan:   Physical exam as described above.  X-ray negative for fracture or dislocation, however does show that an early infection of the 1st MTP space of great toe could not be excluded.  Patient's symptoms also correlate with gout flare.  I will order screening blood work and treat condition as gout flare.  Discussed with patient and wife that I will call them in the morning to discuss the blood work results, they are comfortable with this plan of care and in agreeance.  I discussed that if blood work was indicative of an infection, the blood further treatment would be necessary and discussed at that time.  At this time I have low concern for systemic infection, as patient is afebrile, vital signs stable, and he is otherwise well-appearing.  He is stable for discharge at this time, I will follow up with him tomorrow morning.  Strict urgent care and emergency room return precautions discussed with patient and wife and they both acknowledged understanding.    X-Ray Toe 2 or More Views Right    Result Date: 8/10/2024  EXAMINATION: XR TOE 2 OR MORE VIEWS RIGHT CLINICAL HISTORY: Pain in left toe(s) TECHNIQUE: Three views of the right toes were performed COMPARISON: None FINDINGS: There is deformity of the 2nd ray with residuum of the base of the 2nd proximal phalanx remaining.  This may be postoperative in nature. There is mild demineralization of the osseous structures.  There are erosive changes of the head of the 1st metatarsal and the base of the 1st proximal phalanx. There is soft tissue swelling of the great toe.  This is centered at the 1st MTP joint.  No radiopaque foreign body is identified.  Vascular calcifications are present. There is no evidence of a fracture or dislocation.     No evidence of a fracture or dislocation. Erosive changes centered at the 1st MTP joint with soft tissue swelling.  Early infection within the joint space would be difficult to  exclude.  Suggest correlation with additional clinical parameters. Deformity of the 2nd ray.  Suggest correlation for any surgical history within this region. This report was flagged in Epic as abnormal. Electronically signed by: Christopher Verdugo MD Date:    08/10/2024 Time:    16:06         Acute gout involving toe of left foot, unspecified cause    Great toe pain, left  -     Cancel: XR FOOT COMPLETE 3 VIEW LEFT; Future; Expected date: 08/10/2024  -     X-Ray Toe 2 or More Views Right; Future; Expected date: 08/10/2024  -     URIC ACID; Future; Expected date: 08/10/2024  -     CBC W/ AUTO DIFFERENTIAL; Future; Expected date: 08/10/2024  -     C-REACTIVE PROTEIN; Future; Expected date: 08/10/2024    Other orders  -     ketorolac injection 30 mg  -     dexAMETHasone injection 10 mg  -     colchicine (COLCRYS) 0.6 mg tablet; Take 1 tablet (0.6 mg total) by mouth 2 (two) times daily. Take 2 tablets for first dose, then one more tablet an hour later tonight. Then take one tablet twice daily for 2 days. for 3 days  Dispense: 6 tablet; Refill: 0  -     predniSONE (DELTASONE) 20 MG tablet; Take 2 tablets (40 mg total) by mouth once daily for 3 days, THEN 1 tablet (20 mg total) once daily for 2 days.  Dispense: 8 tablet; Refill: 0

## 2024-08-10 NOTE — PATIENT INSTRUCTIONS
I will call you tomorrow to discuss the results of your blood work.      Start steroids as prescribed starting tomorrow, as you received a steroid shot in clinic today.    Take the Colchicine as prescribed.  You may take Ibuprofen for pain if needed, but start ibuprofen tomorrow as as you received a Toradol injection in clinic today.     Please follow up with your primary care doctor or specialist as needed.    If you experience any significant numbness, tingling, loss of bowel or bladder control, or loss of motor function follow up immediately in the Emergency Department.     - You must understand that you have received an Urgent Care treatment only and that you may be released before all of your medical problems are known or treated.   - You, the patient, will arrange for follow up care as instructed.   - If your condition worsens or fails to improve we recommend that you receive another evaluation at the ER immediately or contact your PCP to discuss your concerns or return here.   - Follow up with your PCP or specialty clinic as directed in the next 1-2 weeks if not improved or as needed.  You can call [phone] to schedule an appointment with the appropriate provider.      If your symptoms do not improve or worsen, go to the emergency room immediately.

## 2024-08-13 ENCOUNTER — TELEPHONE (OUTPATIENT)
Dept: URGENT CARE | Facility: CLINIC | Age: 79
End: 2024-08-13
Payer: MEDICARE

## 2024-08-13 NOTE — TELEPHONE ENCOUNTER
Called to discuss recent lab work with patient and wife.  No significant signs of infection indicated by lab work.  Uric acid levels were within normal range.  Patient's wife states that his symptoms has been resolving, he has very minimal pain at this time, redness has decreased.  I discussed with the wife that if the redness returns, he has increased swelling or pain, or develops fever he should return immediately to urgent care or the emergency room.  She acknowledges understanding and we will follow up as needed.

## 2024-08-22 ENCOUNTER — TELEPHONE (OUTPATIENT)
Dept: OPHTHALMOLOGY | Facility: CLINIC | Age: 79
End: 2024-08-22
Payer: MEDICARE
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
## 2024-10-14 ENCOUNTER — OFFICE VISIT (OUTPATIENT)
Dept: ELECTROPHYSIOLOGY | Facility: CLINIC | Age: 79
End: 2024-10-14
Payer: MEDICARE

## 2024-10-14 ENCOUNTER — HOSPITAL ENCOUNTER (OUTPATIENT)
Dept: CARDIOLOGY | Facility: CLINIC | Age: 79
Discharge: HOME | End: 2024-10-14
Payer: MEDICARE

## 2024-10-14 VITALS
WEIGHT: 217.38 LBS | HEART RATE: 56 BPM | HEIGHT: 73 IN | BODY MASS INDEX: 28.81 KG/M2 | SYSTOLIC BLOOD PRESSURE: 134 MMHG | DIASTOLIC BLOOD PRESSURE: 69 MMHG

## 2024-10-14 DIAGNOSIS — I48.0 PAROXYSMAL ATRIAL FIBRILLATION: Primary | ICD-10-CM

## 2024-10-14 DIAGNOSIS — I10 ESSENTIAL HYPERTENSION: ICD-10-CM

## 2024-10-14 DIAGNOSIS — I48.0 PAROXYSMAL ATRIAL FIBRILLATION: ICD-10-CM

## 2024-10-14 DIAGNOSIS — E78.5 HYPERLIPIDEMIA LDL GOAL <100: ICD-10-CM

## 2024-10-14 LAB
OHS QRS DURATION: 140 MS
OHS QTC CALCULATION: 430 MS

## 2024-10-14 PROCEDURE — 1126F AMNT PAIN NOTED NONE PRSNT: CPT | Mod: HCNC,CPTII,S$GLB, | Performed by: INTERNAL MEDICINE

## 2024-10-14 PROCEDURE — 3078F DIAST BP <80 MM HG: CPT | Mod: HCNC,CPTII,S$GLB, | Performed by: INTERNAL MEDICINE

## 2024-10-14 PROCEDURE — 93010 ELECTROCARDIOGRAM REPORT: CPT | Mod: S$GLB,,, | Performed by: INTERNAL MEDICINE

## 2024-10-14 PROCEDURE — 99214 OFFICE O/P EST MOD 30 MIN: CPT | Mod: HCNC,S$GLB,, | Performed by: INTERNAL MEDICINE

## 2024-10-14 PROCEDURE — 3075F SYST BP GE 130 - 139MM HG: CPT | Mod: HCNC,CPTII,S$GLB, | Performed by: INTERNAL MEDICINE

## 2024-10-14 PROCEDURE — 93005 ELECTROCARDIOGRAM TRACING: CPT | Mod: S$GLB,,, | Performed by: INTERNAL MEDICINE

## 2024-10-14 PROCEDURE — 99999 PR PBB SHADOW E&M-EST. PATIENT-LVL III: CPT | Mod: PBBFAC,,, | Performed by: INTERNAL MEDICINE

## 2024-10-14 PROCEDURE — 1101F PT FALLS ASSESS-DOCD LE1/YR: CPT | Mod: HCNC,CPTII,S$GLB, | Performed by: INTERNAL MEDICINE

## 2024-10-14 PROCEDURE — 1159F MED LIST DOCD IN RCRD: CPT | Mod: HCNC,CPTII,S$GLB, | Performed by: INTERNAL MEDICINE

## 2024-10-14 PROCEDURE — 3288F FALL RISK ASSESSMENT DOCD: CPT | Mod: HCNC,CPTII,S$GLB, | Performed by: INTERNAL MEDICINE
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
## 2024-11-26 DIAGNOSIS — I48.91 NEW ONSET ATRIAL FIBRILLATION: ICD-10-CM

## 2024-12-20 ENCOUNTER — LAB VISIT (OUTPATIENT)
Dept: LAB | Facility: HOSPITAL | Age: 79
End: 2024-12-20
Attending: INTERNAL MEDICINE
Payer: MEDICARE

## 2024-12-20 ENCOUNTER — IMMUNIZATION (OUTPATIENT)
Dept: INTERNAL MEDICINE | Facility: CLINIC | Age: 79
End: 2024-12-20
Payer: MEDICARE

## 2024-12-20 ENCOUNTER — OFFICE VISIT (OUTPATIENT)
Dept: INTERNAL MEDICINE | Facility: CLINIC | Age: 79
End: 2024-12-20
Payer: MEDICARE

## 2024-12-20 VITALS
BODY MASS INDEX: 28.6 KG/M2 | WEIGHT: 215.81 LBS | OXYGEN SATURATION: 98 % | HEART RATE: 76 BPM | SYSTOLIC BLOOD PRESSURE: 132 MMHG | DIASTOLIC BLOOD PRESSURE: 76 MMHG | HEIGHT: 73 IN

## 2024-12-20 DIAGNOSIS — Z87.19 HISTORY OF PANCREATITIS: ICD-10-CM

## 2024-12-20 DIAGNOSIS — Z23 NEED FOR VACCINATION: Primary | ICD-10-CM

## 2024-12-20 DIAGNOSIS — Z09 FOLLOW-UP EXAM: Primary | ICD-10-CM

## 2024-12-20 DIAGNOSIS — E79.0 HYPERURICEMIA: ICD-10-CM

## 2024-12-20 DIAGNOSIS — E11.65 TYPE 2 DIABETES MELLITUS WITH HYPERGLYCEMIA, WITHOUT LONG-TERM CURRENT USE OF INSULIN: ICD-10-CM

## 2024-12-20 DIAGNOSIS — E03.8 SUBCLINICAL HYPOTHYROIDISM: ICD-10-CM

## 2024-12-20 DIAGNOSIS — I48.0 PAROXYSMAL ATRIAL FIBRILLATION: ICD-10-CM

## 2024-12-20 DIAGNOSIS — E78.5 HYPERLIPIDEMIA LDL GOAL <100: ICD-10-CM

## 2024-12-20 DIAGNOSIS — I10 ESSENTIAL HYPERTENSION: ICD-10-CM

## 2024-12-20 LAB
ANION GAP SERPL CALC-SCNC: 9 MMOL/L (ref 8–16)
BUN SERPL-MCNC: 16 MG/DL (ref 8–23)
CALCIUM SERPL-MCNC: 10.5 MG/DL (ref 8.7–10.5)
CHLORIDE SERPL-SCNC: 105 MMOL/L (ref 95–110)
CO2 SERPL-SCNC: 30 MMOL/L (ref 23–29)
CREAT SERPL-MCNC: 1 MG/DL (ref 0.5–1.4)
EST. GFR  (NO RACE VARIABLE): >60 ML/MIN/1.73 M^2
ESTIMATED AVG GLUCOSE: 123 MG/DL (ref 68–131)
GLUCOSE SERPL-MCNC: 94 MG/DL (ref 70–110)
HBA1C MFR BLD: 5.9 % (ref 4–5.6)
POTASSIUM SERPL-SCNC: 3.9 MMOL/L (ref 3.5–5.1)
SODIUM SERPL-SCNC: 144 MMOL/L (ref 136–145)
URATE SERPL-MCNC: 6.8 MG/DL (ref 3.4–7)

## 2024-12-20 PROCEDURE — 36415 COLL VENOUS BLD VENIPUNCTURE: CPT | Mod: HCNC | Performed by: INTERNAL MEDICINE

## 2024-12-20 PROCEDURE — 83036 HEMOGLOBIN GLYCOSYLATED A1C: CPT | Mod: HCNC | Performed by: INTERNAL MEDICINE

## 2024-12-20 PROCEDURE — 80048 BASIC METABOLIC PNL TOTAL CA: CPT | Mod: HCNC | Performed by: INTERNAL MEDICINE

## 2024-12-20 PROCEDURE — 99999 PR PBB SHADOW E&M-EST. PATIENT-LVL III: CPT | Mod: PBBFAC,HCNC,, | Performed by: INTERNAL MEDICINE

## 2024-12-20 PROCEDURE — 84550 ASSAY OF BLOOD/URIC ACID: CPT | Mod: HCNC | Performed by: INTERNAL MEDICINE

## 2024-12-20 NOTE — PROGRESS NOTES
Subjective:       Patient ID: Ezekiel Mueller is a 79 y.o. male.    Chief Complaint: Follow-up      HPI  Ezekiel Mueller is a 79 y.o. year old male with HTN, HLD, t2DM, hx of pancreatitis, hx of skin cancers presents for follow up. Doing well with no new complaints. Does report new skin lesion on dorsum of left hand which has not gone away.     Review of Systems   Constitutional:  Negative for activity change, appetite change, chills, fatigue, fever and unexpected weight change.   HENT:  Negative for congestion, rhinorrhea and sore throat.    Eyes:  Negative for visual disturbance.   Respiratory:  Negative for shortness of breath.    Cardiovascular:  Negative for chest pain.   Gastrointestinal:  Negative for abdominal pain, diarrhea, nausea and vomiting.   Genitourinary:  Negative for difficulty urinating and dysuria.   Musculoskeletal:  Negative for arthralgias, back pain and myalgias.   Skin:  Negative for color change and rash.   Neurological:  Negative for dizziness, weakness and headaches.         Past Medical History:   Diagnosis Date    Basal cell carcinoma 02/2015    R eyelid    Basal cell carcinoma (BCC) of back 02/07/2024    right upper back    Basal cell carcinoma, arm, right 02/07/2024    right arm    Borderline diabetes 2016    Colon polyps     [facility], none on [facility]    Elevated blood uric acid level 2016    Fatty liver     Hypertension     diagnosed 2005    Pancreatitis 07/2015    etiology unclear        Prior to Admission medications    Medication Sig Start Date End Date Taking? Authorizing Provider   allopurinoL (ZYLOPRIM) 100 MG tablet Take 1 tablet (100 mg total) by mouth once daily. 8/2/24  Yes Pretus-Meredith Tamayo NP   amLODIPine (NORVASC) 10 MG tablet Take 1 tablet (10 mg total) by mouth once daily. For blood pressure 8/2/24  Yes Ye Dudley MD   atorvastatin (LIPITOR) 40 MG tablet Take 1 tablet (40 mg total) by mouth once daily. 5/10/24 5/10/25 Yes Ye Dudley  "MD   cholecalciferol vitamin D3, (VITAMIN D3) 50 mcg (2,000 unit) Cap Take 1 capsule by mouth once daily.   Yes Provider, Historical   hydroCHLOROthiazide (HYDRODIURIL) 25 MG tablet Take 1 tablet (25 mg total) by mouth once daily. 1/2/24  Yes Ye Dudley MD   losartan (COZAAR) 25 MG tablet Take 0.5 tablets (12.5 mg total) by mouth once daily. 5/10/24  Yes Ye Dudley MD   metFORMIN (GLUCOPHAGE) 500 MG tablet TAKE 1 TABLET TWICE DAILY WITH MEALS 12/5/24  Yes Ye Dudley MD   omega-3 fatty acids/fish oil (FISH OIL-OMEGA-3 FATTY ACIDS) 300-1,000 mg capsule Take by mouth once daily.   Yes Provider, Historical   predniSONE (DELTASONE) 20 MG tablet Take 1 tablet (20 mg total) by mouth once daily. 5/9/24  Yes Meredith Campos NP   rivaroxaban (XARELTO) 20 mg Tab Take 1 tablet (20 mg total) by mouth Daily. 11/30/24  Yes Fermín Trevino MD   sotaloL (BETAPACE) 80 MG tablet TAKE 1 TABLET TWICE DAILY 8/6/24  Yes Fermín Trevino MD   colchicine (COLCRYS) 0.6 mg tablet Take 1 tablet (0.6 mg total) by mouth 2 (two) times daily. Take 2 tablets for first dose, then one more tablet an hour later tonight. Then take one tablet twice daily for 2 days. for 3 days 8/10/24 8/13/24  Marie Palacio NP        Past medical history, surgical history, and family medical history reviewed and updated as appropriate.    Medications and allergies reviewed.     Objective:          Vitals:    12/20/24 1415   BP: 132/76   BP Location: Right arm   Patient Position: Sitting   Pulse: 76   SpO2: 98%   Weight: 97.9 kg (215 lb 13.3 oz)   Height: 6' 1" (1.854 m)     Body mass index is 28.48 kg/m².  Physical Exam  Constitutional:       General: He is not in acute distress.     Appearance: He is well-developed.   HENT:      Head: Normocephalic and atraumatic.      Nose: Nose normal.   Eyes:      General: No scleral icterus.     Extraocular Movements: Extraocular movements intact.   Neck:      Thyroid: No thyromegaly.      Vascular: No JVD.      " Trachea: No tracheal deviation.   Cardiovascular:      Rate and Rhythm: Normal rate and regular rhythm.      Heart sounds: Normal heart sounds. No murmur heard.     No friction rub. No gallop.   Pulmonary:      Effort: Pulmonary effort is normal. No respiratory distress.      Breath sounds: Normal breath sounds. No wheezing or rales.   Abdominal:      General: Bowel sounds are normal. There is no distension.      Palpations: Abdomen is soft. There is no mass.      Tenderness: There is no abdominal tenderness.   Musculoskeletal:         General: No tenderness. Normal range of motion.      Cervical back: Normal range of motion and neck supple.   Lymphadenopathy:      Cervical: No cervical adenopathy.   Skin:     General: Skin is warm and dry.      Findings: No rash.   Neurological:      Mental Status: He is alert and oriented to person, place, and time.      Cranial Nerves: No cranial nerve deficit.      Deep Tendon Reflexes: Reflexes normal.   Psychiatric:         Behavior: Behavior normal.         Lab Results   Component Value Date    WBC 7.95 08/10/2024    HGB 13.4 (L) 08/10/2024    HCT 41.2 08/10/2024     08/10/2024    CHOL 126 04/22/2024    TRIG 99 04/22/2024    HDL 47 04/22/2024    ALT 29 04/22/2024    AST 23 04/22/2024     04/22/2024    K 4.3 04/22/2024     04/22/2024    CREATININE 1.0 04/22/2024    BUN 15 04/22/2024    CO2 30 (H) 04/22/2024    TSH 3.267 10/20/2023    PSA 0.80 04/23/2021    INR 1.3 (H) 07/09/2022    HGBA1C 5.9 (H) 04/22/2024       Assessment:       1. Follow-up exam    2. Type 2 diabetes mellitus with hyperglycemia, without long-term current use of insulin    3. Essential hypertension    4. Hyperlipidemia LDL goal <100    5. History of pancreatitis    6. Paroxysmal atrial fibrillation    7. Subclinical hypothyroidism    8. Hyperuricemia          Plan:     Ezekiel was seen today for follow-up.    Diagnoses and all orders for this visit:    Follow-up exam    Type 2 diabetes  mellitus with hyperglycemia, without long-term current use of insulin  Comments:  on metformin 500 mg bid, last a1c 5.8; recheck a1c.  Orders:  -     HEMOGLOBIN A1C; Future  -     BASIC METABOLIC PANEL; Future  -     Microalbumin/Creatinine Ratio, Urine; Future    Essential hypertension  Comments:  controlled, no changes to current management    Hyperlipidemia LDL goal <100  Comments:  on lipitor 40 mg daily, controlled, no changes to current management    History of pancreatitis    Paroxysmal atrial fibrillation  Comments:  on sotalol 80, xarelto    Subclinical hypothyroidism    Hyperuricemia  Comments:  on allopurinol, recheck uric acid  Orders:  -     URIC ACID; Future        Health maintenance reviewed with patient.     Follow up in about 6 months (around 6/20/2025).    Ye Dudley MD  Internal Medicine / Primary Care  [facility]  12/20/2024

## 2024-12-20 NOTE — PATIENT INSTRUCTIONS
Flu shot today  RSV vaccination today at pharmacy.   Labwork today to recheck diabetes and kidney function.   Urine today.   Schedule optometry appointment  Schedule dermatology appointment    Return to clinic in 6 months or sooner if needed.

## 2025-01-01 DIAGNOSIS — I10 HTN, GOAL BELOW 140/90: ICD-10-CM

## 2025-01-01 NOTE — TELEPHONE ENCOUNTER
No care due was identified.  Health [facility] Embedded Care Due Messages. Reference number: [id number].   1/01/2025 4:52:50 AM CST

## 2025-01-02 RX ORDER — HYDROCHLOROTHIAZIDE 25 MG/1
25 TABLET ORAL
Qty: 90 TABLET | Refills: 3 | Status: SHIPPED | OUTPATIENT
Start: 2025-01-02

## 2025-01-02 NOTE — TELEPHONE ENCOUNTER
Refill Routing Note   Medication(s) are not appropriate for processing by [facility] for the following reason(s):        Drug-disease interaction: hydroCHLOROthiazide and Hyperuricemia     [facility] action(s):  Defer             Pharmacist review requested: Yes     Appointments  past 12m or future 3m with PCP    Date Provider   Last Visit   12/20/2024 Ye Dudley MD   Next Visit   6/20/2025 Ye Dudley MD   ED visits in past 90 days: 0        Note composed:2:02 PM 01/02/2025

## 2025-01-02 NOTE — TELEPHONE ENCOUNTER
Refill Decision Note   Ezekiel Mueller  is requesting a refill authorization.  Brief Assessment and Rationale for Refill:  Approve     Medication Therapy Plan: uric acid WNL      Pharmacist review requested: Yes   Comments:     Note composed:5:02 PM 01/02/2025               Notified Dr. Gutierrez [phone]. [phone] about admission.

## 2025-01-06 DIAGNOSIS — I10 HTN, GOAL BELOW 140/90: ICD-10-CM

## 2025-01-06 DIAGNOSIS — I10 ESSENTIAL HYPERTENSION: ICD-10-CM

## 2025-01-06 RX ORDER — HYDROCHLOROTHIAZIDE 25 MG/1
25 TABLET ORAL DAILY
Qty: 90 TABLET | Refills: 3 | Status: SHIPPED | OUTPATIENT
Start: 2025-01-06

## 2025-01-06 RX ORDER — SOTALOL HYDROCHLORIDE 80 MG/1
80 TABLET ORAL 2 TIMES DAILY
Qty: 180 TABLET | Refills: 3 | Status: SHIPPED | OUTPATIENT
Start: 2025-01-06

## 2025-01-06 RX ORDER — AMLODIPINE BESYLATE 10 MG/1
10 TABLET ORAL DAILY
Qty: 90 TABLET | Refills: 2 | Status: SHIPPED | OUTPATIENT
Start: 2025-01-06

## 2025-01-06 NOTE — TELEPHONE ENCOUNTER
No care due was identified.  Health [facility] Embedded Care Due Messages. Reference number: [id number].   1/06/2025 12:09:43 PM CST

## 2025-01-07 ENCOUNTER — OFFICE VISIT (OUTPATIENT)
Dept: DERMATOLOGY | Facility: CLINIC | Age: 80
End: 2025-01-07
Payer: MEDICARE

## 2025-01-07 DIAGNOSIS — D48.5 NEOPLASM OF UNCERTAIN BEHAVIOR OF SKIN: Primary | ICD-10-CM

## 2025-01-07 DIAGNOSIS — L57.0 AK (ACTINIC KERATOSIS): ICD-10-CM

## 2025-01-07 DIAGNOSIS — Z85.828 HISTORY OF NONMELANOMA SKIN CANCER: ICD-10-CM

## 2025-01-07 DIAGNOSIS — L73.8 SEBACEOUS HYPERPLASIA OF FACE: ICD-10-CM

## 2025-01-07 DIAGNOSIS — L82.1 SK (SEBORRHEIC KERATOSIS): ICD-10-CM

## 2025-01-07 PROCEDURE — 99213 OFFICE O/P EST LOW 20 MIN: CPT | Mod: 25,HCNC,S$GLB, | Performed by: STUDENT IN AN ORGANIZED HEALTH CARE EDUCATION/TRAINING PROGRAM

## 2025-01-07 PROCEDURE — 17000 DESTRUCT PREMALG LESION: CPT | Mod: HCNC,XS,S$GLB, | Performed by: STUDENT IN AN ORGANIZED HEALTH CARE EDUCATION/TRAINING PROGRAM

## 2025-01-07 PROCEDURE — 1160F RVW MEDS BY RX/DR IN RCRD: CPT | Mod: HCNC,CPTII,S$GLB, | Performed by: STUDENT IN AN ORGANIZED HEALTH CARE EDUCATION/TRAINING PROGRAM

## 2025-01-07 PROCEDURE — 1101F PT FALLS ASSESS-DOCD LE1/YR: CPT | Mod: HCNC,CPTII,S$GLB, | Performed by: STUDENT IN AN ORGANIZED HEALTH CARE EDUCATION/TRAINING PROGRAM

## 2025-01-07 PROCEDURE — 88305 TISSUE EXAM BY PATHOLOGIST: CPT | Mod: HCNC | Performed by: PATHOLOGY

## 2025-01-07 PROCEDURE — 1159F MED LIST DOCD IN RCRD: CPT | Mod: HCNC,CPTII,S$GLB, | Performed by: STUDENT IN AN ORGANIZED HEALTH CARE EDUCATION/TRAINING PROGRAM

## 2025-01-07 PROCEDURE — 88305 TISSUE EXAM BY PATHOLOGIST: CPT | Mod: 26,HCNC,, | Performed by: PATHOLOGY

## 2025-01-07 PROCEDURE — 11102 TANGNTL BX SKIN SINGLE LES: CPT | Mod: HCNC,S$GLB,, | Performed by: STUDENT IN AN ORGANIZED HEALTH CARE EDUCATION/TRAINING PROGRAM

## 2025-01-07 PROCEDURE — 1126F AMNT PAIN NOTED NONE PRSNT: CPT | Mod: HCNC,CPTII,S$GLB, | Performed by: STUDENT IN AN ORGANIZED HEALTH CARE EDUCATION/TRAINING PROGRAM

## 2025-01-07 PROCEDURE — 17003 DESTRUCT PREMALG LES 2-14: CPT | Mod: HCNC,S$GLB,, | Performed by: STUDENT IN AN ORGANIZED HEALTH CARE EDUCATION/TRAINING PROGRAM

## 2025-01-07 PROCEDURE — 99999 PR PBB SHADOW E&M-EST. PATIENT-LVL III: CPT | Mod: PBBFAC,HCNC,, | Performed by: STUDENT IN AN ORGANIZED HEALTH CARE EDUCATION/TRAINING PROGRAM

## 2025-01-07 PROCEDURE — 3288F FALL RISK ASSESSMENT DOCD: CPT | Mod: HCNC,CPTII,S$GLB, | Performed by: STUDENT IN AN ORGANIZED HEALTH CARE EDUCATION/TRAINING PROGRAM

## 2025-01-07 NOTE — PROGRESS NOTES
Subjective:      Patient ID:  Ezekiel Mueller is a 79 y.o. male who presents for No chief complaint on file.    Ezekiel Mueller is a 79 y.o. male who presents for: UBSE screening exam for skin cancer.    Last office visit 12/15/2023 with Dr. Alcaraz     The patient has the following lesions of concern:  Location: right hand   Duration: 1 year   Symptoms: none   Relieving factors/Previous treatments: none     Pertinent history:  History of blistering sunburns: No  History of tanning bed use: No  Family history of melanoma: No  Personal history of mole removal: Yes  Personal history of skin cancer: Yes- a few NMSC no melanoma hx, most recently the following:  BCC infiltrative R upper back Mohs 2-2024      Review of Systems   Skin:  Positive for wears hat (most of the time). Negative for daily sunscreen use, activity-related sunscreen use and recent sunburn.   Hematologic/Lymphatic: Bruises/bleeds easily (daily blood thinners).       Objective:   Physical Exam   Constitutional: He appears well-developed and well-nourished. No distress.   Neurological: He is alert and oriented to person, place, and time. He is not disoriented.   Psychiatric: He has a normal mood and affect.   Skin:   Areas Examined (abnormalities noted in diagram):   Scalp / Hair Palpated and Inspected  Head / Face Inspection Performed  Neck Inspection Performed  Chest / Axilla Inspection Performed  Abdomen Inspection Performed  Back Inspection Performed  RUE Inspected  LUE Inspection Performed  Nails and Digits Inspection Performed                     Diagram Legend     Erythematous scaling macule/papule c/w actinic keratosis       Vascular papule c/w angioma      Pigmented verrucoid papule/plaque c/w seborrheic keratosis      Yellow umbilicated papule c/w sebaceous hyperplasia      Irregularly shaped tan macule c/w lentigo     1-2 mm smooth white papules consistent with Milia      Movable subcutaneous cyst with punctum c/w epidermal inclusion cyst       Subcutaneous movable cyst c/w pilar cyst      Firm pink to brown papule c/w dermatofibroma      Pedunculated fleshy papule(s) c/w skin tag(s)      Evenly pigmented macule c/w junctional nevus     Mildly variegated pigmented, slightly irregular-bordered macule c/w mildly atypical nevus      Flesh colored to evenly pigmented papule c/w intradermal nevus       Pink pearly papule/plaque c/w basal cell carcinoma      Erythematous hyperkeratotic cursted plaque c/w SCC      Surgical scar with no sign of skin cancer recurrence      Open and closed comedones      Inflammatory papules and pustules      Verrucoid papule consistent consistent with wart     Erythematous eczematous patches and plaques     Dystrophic onycholytic nail with subungual debris c/w onychomycosis     Umbilicated papule    Erythematous-base heme-crusted tan verrucoid plaque consistent with inflamed seborrheic keratosis     Erythematous Silvery Scaling Plaque c/w Psoriasis     See annotation              Assessment / Plan:      Pathology Orders:       Normal Orders This Visit    Specimen to Pathology, Dermatology     Questions:    Procedure Type: Dermatology and skin neoplasms    Number of Specimens: 1    ------------------------: -------------------------    Spec 1 Procedure: Biopsy    Spec 1 Clinical Impression: skin-colored hyperkeratotic nodule - r/o scc v PN v wart    Spec 1 Source: R dorsal hand    Release to patient: Immediate    Release to patient:           Neoplasm of uncertain behavior of skin  -     Specimen to Pathology, Dermatology  Shave biopsy procedure note:    Shave biopsy performed after verbal consent including risk of infection, scar, recurrence, need for additional treatment of site. Area prepped with alcohol, anesthetized with approximately 1.0cc of 1% lidocaine with epinephrine. Lesional tissue shaved with razor blade. Hemostasis achieved with application of aluminum chloride followed by hyfrecation. No complications. Dressing  applied. Wound care explained.    If + invasive SCC will send to Mohs    AK (actinic keratosis)  Cryosurgery Procedure Note    Verbal consent from the patient is obtained including, but not limited to, risk of hypopigmentation/hyperpigmentation, scar, recurrence of lesion. The patient is aware of the precancerous quality and need for treatment of these lesions. Liquid nitrogen cryosurgery is applied to the 4 actinic keratoses, as detailed in the physical exam, to produce a freeze injury. The patient is aware that blisters may form and is instructed on wound care with gentle cleansing and use of vaseline ointment to keep moist until healed. The patient is supplied a handout on cryosurgery and is instructed to call if lesions do not completely resolve.  Wear hat always    SK (seborrheic keratosis)  These are benign inherited growths without a malignant potential. Reassurance given to patient. No treatment is necessary.     Sebaceous hyperplasia of face  This is a common condition representing benign enlargement of the sebaceous lobule. It typically occurs in adulthood. Reassurance given to patient.     History of nonmelanoma skin cancer  Examined areas of prior scars, no evidence of recurrence  - Continue skin exams at least annually, increased risk of additional skin cancers developing in the future  - I discussed the importance of sun protection with the patient. Recommend daily use of SPF 30+ physical or mineral sunscreen, apply 15 minutes before going outdoors and reapply every 2 hours. Discussed wide-brimmed hats and UPF 50+ clothing.    RTC 6 mo, sooner prn

## 2025-01-09 LAB
FINAL PATHOLOGIC DIAGNOSIS: NORMAL
GROSS: NORMAL
Lab: NORMAL
MICROSCOPIC EXAM: NORMAL

## 2025-01-14 DIAGNOSIS — E79.0 HYPERURICEMIA: ICD-10-CM

## 2025-01-15 RX ORDER — ALLOPURINOL 100 MG/1
100 TABLET ORAL DAILY
Qty: 90 TABLET | Refills: 2 | Status: SHIPPED | OUTPATIENT
Start: 2025-01-15

## 2025-01-30 DIAGNOSIS — Z00.00 ENCOUNTER FOR MEDICARE ANNUAL WELLNESS EXAM: ICD-10-CM

## 2025-04-09 DIAGNOSIS — I10 ESSENTIAL HYPERTENSION: ICD-10-CM

## 2025-04-09 DIAGNOSIS — R73.03 BORDERLINE DIABETES MELLITUS: ICD-10-CM

## 2025-04-09 RX ORDER — METFORMIN HYDROCHLORIDE 500 MG/1
500 TABLET ORAL 2 TIMES DAILY WITH MEALS
Qty: 180 TABLET | Refills: 0 | Status: SHIPPED | OUTPATIENT
Start: 2025-04-09

## 2025-04-09 RX ORDER — LOSARTAN POTASSIUM 25 MG/1
12.5 TABLET ORAL DAILY
Qty: 45 TABLET | Refills: 2 | Status: SHIPPED | OUTPATIENT
Start: 2025-04-09

## 2025-04-09 NOTE — TELEPHONE ENCOUNTER
No care due was identified.  Health [facility] Embedded Care Due Messages. Reference number: [id number].   4/09/2025 11:22:28 AM CDT

## 2025-04-09 NOTE — TELEPHONE ENCOUNTER
Refill Decision Note   Ezekiel Mueller  is requesting a refill authorization.  Brief Assessment and Rationale for Refill:  Approve     Medication Therapy Plan:        Comments:     Note composed:12:50 PM 04/09/2025

## 2025-04-09 NOTE — TELEPHONE ENCOUNTER
Refill Decision Note   Ezekiel Ervin  is requesting a refill authorization.  Brief Assessment and Rationale for Refill:  Approve     Medication Therapy Plan:        Comments:     Note composed:12:49 PM 04/09/2025

## 2025-04-09 NOTE — TELEPHONE ENCOUNTER
No care due was identified.  Health [facility] Embedded Care Due Messages. Reference number: [id number].   4/09/2025 11:23:02 AM CDT

## 2025-05-02 ENCOUNTER — OFFICE VISIT (OUTPATIENT)
Dept: URGENT CARE | Facility: CLINIC | Age: 80
End: 2025-05-02
Payer: MEDICARE

## 2025-05-02 VITALS
DIASTOLIC BLOOD PRESSURE: 73 MMHG | OXYGEN SATURATION: 98 % | TEMPERATURE: 99 F | HEART RATE: 60 BPM | HEIGHT: 73 IN | RESPIRATION RATE: 16 BRPM | BODY MASS INDEX: 28.63 KG/M2 | WEIGHT: 216.06 LBS | SYSTOLIC BLOOD PRESSURE: 126 MMHG

## 2025-05-02 DIAGNOSIS — M10.9 ACUTE GOUT INVOLVING TOE OF RIGHT FOOT, UNSPECIFIED CAUSE: Primary | ICD-10-CM

## 2025-05-02 RX ORDER — DEXAMETHASONE SODIUM PHOSPHATE 10 MG/ML
10 INJECTION INTRAMUSCULAR; INTRAVENOUS
Status: DISCONTINUED | OUTPATIENT
Start: 2025-05-02 | End: 2025-05-02

## 2025-05-02 RX ORDER — DEXAMETHASONE SODIUM PHOSPHATE 10 MG/ML
10 INJECTION INTRAMUSCULAR; INTRAVENOUS
Status: COMPLETED | OUTPATIENT
Start: 2025-05-02 | End: 2025-05-02

## 2025-05-02 RX ORDER — PREDNISONE 20 MG/1
TABLET ORAL
Qty: 8 TABLET | Refills: 0 | Status: SHIPPED | OUTPATIENT
Start: 2025-05-02 | End: 2025-05-07

## 2025-05-02 RX ORDER — COLCHICINE 0.6 MG/1
0.6 TABLET ORAL 2 TIMES DAILY
Qty: 6 TABLET | Refills: 0 | Status: SHIPPED | OUTPATIENT
Start: 2025-05-02 | End: 2025-05-05

## 2025-05-02 RX ADMIN — DEXAMETHASONE SODIUM PHOSPHATE 10 MG: 10 INJECTION INTRAMUSCULAR; INTRAVENOUS at 06:05

## 2025-05-02 NOTE — PATIENT INSTRUCTIONS
You already received a steroid injection today.  You can begin taking the oral steroids tomorrow.    Begin taking the colchicine as prescribed.    - Follow up with your PCP or specialty clinic as directed in the next 1-2 weeks if not improved or as needed.  You can call [phone] to schedule an appointment with the appropriate provider.    - Go to the ER or seek medical attention immediately if you develop new or worsening symptoms.    - You must understand that you have received an Urgent Care treatment only and that you may be released before all of your medical problems are known or treated.   - You, the patient, will arrange for follow up care as instructed.   - If your condition worsens or fails to improve we recommend that you receive another evaluation at the ER immediately or contact your PCP to discuss your concerns or return here.

## 2025-05-02 NOTE — PROGRESS NOTES
"Subjective:      Patient ID: Ezekiel Mueller is a 79 y.o. male.    Vitals:  height is 6' 1" (1.854 m) and weight is 98 kg (216 lb 0.8 oz). His oral temperature is 98.5 °F (36.9 °C). His blood pressure is 126/73 and his pulse is 60. His respiration is 16 and oxygen saturation is 98%.     Chief Complaint: Foot Swelling    79-year-old male here for right foot pain, swelling, redness that started 4 days ago.  History of gout.  Has been taking Tylenol without relief.  Symptoms started after he ate fried shrimp.  Seafood seems to be a trigger for gout flare-ups.  Denies any injury to his foot.    Pain  This is a new problem. The current episode started in the past 7 days. The problem occurs constantly. The problem has been gradually worsening. Associated symptoms include arthralgias and joint swelling. Pertinent negatives include no chills, fatigue, fever, nausea, numbness, rash, vomiting or weakness. The symptoms are aggravated by standing and walking. He has tried acetaminophen for the symptoms. The treatment provided no relief.       Constitution: Negative for chills, fatigue and fever.   Gastrointestinal:  Negative for nausea and vomiting.   Musculoskeletal:  Positive for joint pain, joint swelling and pain with walking. Negative for trauma.   Skin:  Positive for erythema. Negative for rash.   Neurological:  Negative for numbness and tingling.      Objective:     Physical Exam   Constitutional: He is oriented to person, place, and time. He appears well-developed.   HENT:   Head: Normocephalic and atraumatic.   Ears:   Right Ear: External ear normal.   Left Ear: External ear normal.   Nose: Nose normal.   Mouth/Throat: Oropharynx is clear and moist.   Eyes: Conjunctivae, EOM and lids are normal.   Neck: Trachea normal and phonation normal. Neck supple.   Musculoskeletal: Normal range of motion.         General: Normal range of motion.      Comments: Tenderness with mild swelling and mild erythema over the 5th MTP joint " of the right foot. Cap refill <2s. 2+ DP and PT pulses. Sensation to light touch intact.   Neurological: He is alert and oriented to person, place, and time.   Skin: Skin is warm, dry and intact. erythema   Psychiatric: His speech is normal and behavior is normal. Judgment and thought content normal.   Nursing note and vitals reviewed.    Assessment:     1. Acute gout involving toe of right foot, unspecified cause      Plan:     Acute gout involving toe of right foot, unspecified cause  -     Discontinue: dexAMETHasone injection 10 mg  -     dexAMETHasone injection 10 mg  -     colchicine (COLCRYS) 0.6 mg tablet; Take 1 tablet (0.6 mg total) by mouth 2 (two) times daily. Take 2 tablets for first dose, then one more tablet an hour later tonight. Then take one tablet twice daily for 2 days. for 3 days  Dispense: 6 tablet; Refill: 0  -     predniSONE (DELTASONE) 20 MG tablet; Take 2 tablets (40 mg total) by mouth once daily for 3 days, THEN 1 tablet (20 mg total) once daily for 2 days.  Dispense: 8 tablet; Refill: 0    Hx of gout. Will treat for gout flare-up with steroids and colchicine. No hx of trauma, so do not suspect fracture/dislocation. Do not suspect cellulitis or other infection at this time.        Patient Instructions   You already received a steroid injection today.  You can begin taking the oral steroids tomorrow.    Begin taking the colchicine as prescribed.    - Follow up with your PCP or specialty clinic as directed in the next 1-2 weeks if not improved or as needed.  You can call [phone] to schedule an appointment with the appropriate provider.    - Go to the ER or seek medical attention immediately if you develop new or worsening symptoms.    - You must understand that you have received an Urgent Care treatment only and that you may be released before all of your medical problems are known or treated.   - You, the patient, will arrange for follow up care as instructed.   - If your condition  worsens or fails to improve we recommend that you receive another evaluation at the ER immediately or contact your PCP to discuss your concerns or return here.

## 2025-05-09 ENCOUNTER — OFFICE VISIT (OUTPATIENT)
Dept: DERMATOLOGY | Facility: CLINIC | Age: 80
End: 2025-05-09
Payer: MEDICARE

## 2025-05-09 DIAGNOSIS — Z85.828 HISTORY OF NONMELANOMA SKIN CANCER: ICD-10-CM

## 2025-05-09 DIAGNOSIS — L73.8 SEBACEOUS HYPERPLASIA OF FACE: ICD-10-CM

## 2025-05-09 DIAGNOSIS — L82.1 SK (SEBORRHEIC KERATOSIS): Primary | ICD-10-CM

## 2025-05-09 PROCEDURE — 99999 PR PBB SHADOW E&M-EST. PATIENT-LVL II: CPT | Mod: PBBFAC,HCNC,, | Performed by: STUDENT IN AN ORGANIZED HEALTH CARE EDUCATION/TRAINING PROGRAM

## 2025-05-09 NOTE — PROGRESS NOTES
Subjective:      Patient ID:  Ezekiel Mueller is a 79 y.o. male who presents for   Chief Complaint   Patient presents with    Skin Check     UBSE      Ezekiel Mueller is a 79 y.o. male who presents for: UBSE screening exam for skin cancer.    Last office visit 01/07/2025 with me for UBSE and cryosurgery to actinic keratosis which have resolved    The patient has the following lesions of concern:  Location: left 4th digit    Duration: 4 months   Symptoms: asymptomatic    Hard to touch   Relieving factors/Previous treatments: none     Pertinent history:  History of blistering sunburns: No  History of tanning bed use: No  Family history of melanoma: No  Personal history of mole removal: Yes  Personal history of skin cancer: Yes- a few NMSC no melanoma hx, most recently the following:  BCC infiltrative R upper back Mohs 2-2024      Review of Systems   Skin:  Positive for wears hat (most of the time). Negative for daily sunscreen use, activity-related sunscreen use and recent sunburn.   Hematologic/Lymphatic: Bruises/bleeds easily (daily blood thinners).       Objective:   Physical Exam   Constitutional: He appears well-developed and well-nourished. No distress.   Neurological: He is alert and oriented to person, place, and time. He is not disoriented.   Psychiatric: He has a normal mood and affect.   Skin:   Areas Examined (abnormalities noted in diagram):   Scalp / Hair Palpated and Inspected  Head / Face Inspection Performed  Neck Inspection Performed  Chest / Axilla Inspection Performed  Abdomen Inspection Performed  Back Inspection Performed  RUE Inspected  LUE Inspection Performed  Nails and Digits Inspection Performed                         Diagram Legend     Erythematous scaling macule/papule c/w actinic keratosis       Vascular papule c/w angioma      Pigmented verrucoid papule/plaque c/w seborrheic keratosis      Yellow umbilicated papule c/w sebaceous hyperplasia      Irregularly shaped tan macule c/w  lentigo     1-2 mm smooth white papules consistent with Milia      Movable subcutaneous cyst with punctum c/w epidermal inclusion cyst      Subcutaneous movable cyst c/w pilar cyst      Firm pink to brown papule c/w dermatofibroma      Pedunculated fleshy papule(s) c/w skin tag(s)      Evenly pigmented macule c/w junctional nevus     Mildly variegated pigmented, slightly irregular-bordered macule c/w mildly atypical nevus      Flesh colored to evenly pigmented papule c/w intradermal nevus       Pink pearly papule/plaque c/w basal cell carcinoma      Erythematous hyperkeratotic cursted plaque c/w SCC      Surgical scar with no sign of skin cancer recurrence      Open and closed comedones      Inflammatory papules and pustules      Verrucoid papule consistent consistent with wart     Erythematous eczematous patches and plaques     Dystrophic onycholytic nail with subungual debris c/w onychomycosis     Umbilicated papule    Erythematous-base heme-crusted tan verrucoid plaque consistent with inflamed seborrheic keratosis     Erythematous Silvery Scaling Plaque c/w Psoriasis     See annotation      Assessment / Plan:      SK (seborrheic keratosis)  These are benign inherited growths without a malignant potential. Reassurance given to patient. No treatment is necessary.      Sebaceous hyperplasia of face  This is a common condition representing benign enlargement of the sebaceous lobule. It typically occurs in adulthood. Reassurance given to patient.     History of nonmelanoma skin cancer  Area(s) of previous NMSC evaluated with no signs of recurrence.    Upper body skin examination performed today including at least 6 points as noted in physical examination. No lesions suspicious for malignancy noted.    Recommend daily sun protection/avoidance and use of at least SPF 30, broad spectrum sunscreen (OTC drug).     RTC 1 year, sooner prn

## 2025-06-07 DIAGNOSIS — I70.0 ATHEROSCLEROSIS OF AORTA: ICD-10-CM

## 2025-06-07 DIAGNOSIS — E78.5 HYPERLIPIDEMIA LDL GOAL <100: ICD-10-CM

## 2025-06-07 NOTE — TELEPHONE ENCOUNTER
Care Due:                  Date            Visit Type   Department     Provider  --------------------------------------------------------------------------------                                EP -                              PRIMARY      [facility] INTERNAL  Last Visit: 12-      CARE ([facility])   KATHY Dudley                              [facility]                              PRIMARY      [facility] INTERNAL  Next Visit: 06-      CARE ([facility])   KATHY Dudley                                                            Last  Test          Frequency    Reason                     Performed    Due Date  --------------------------------------------------------------------------------    CBC.........  12 months..  allopurinoL..............  08-   08-    CMP.........  12 months..  allopurinoL, atorvastatin  04- 04-    HBA1C.......  6 months...  metFORMIN................  12- 06-    Lipid Panel.  12 months..  atorvastatin.............  04- 04-    [facility] Embedded Care Due Messages. Reference number: [id number].   6/07/2025 2:10:31 AM CDT

## 2025-06-08 NOTE — TELEPHONE ENCOUNTER
Refill Routing Note   Medication(s) are not appropriate for processing by [facility] for the following reason(s):        Required labs outdated    [facility] action(s):  Defer   Requires labs : Yes      Medication Therapy Plan: FOVS      Appointments  past 12m or future 3m with PCP    Date Provider   Last Visit   12/20/2024 Ye Dudley MD   Next Visit   6/20/2025 Ye Dudley MD   ED visits in past 90 days: 0        Note composed:11:11 PM 06/07/2025

## 2025-06-10 RX ORDER — ATORVASTATIN CALCIUM 40 MG/1
40 TABLET, FILM COATED ORAL
Qty: 90 TABLET | Refills: 3 | Status: SHIPPED | OUTPATIENT
Start: 2025-06-10

## 2025-06-27 ENCOUNTER — OFFICE VISIT (OUTPATIENT)
Dept: INTERNAL MEDICINE | Facility: CLINIC | Age: 80
End: 2025-06-27
Payer: MEDICARE

## 2025-06-27 ENCOUNTER — LAB VISIT (OUTPATIENT)
Dept: LAB | Facility: HOSPITAL | Age: 80
End: 2025-06-27
Attending: INTERNAL MEDICINE
Payer: MEDICARE

## 2025-06-27 VITALS
WEIGHT: 209.69 LBS | HEIGHT: 73 IN | DIASTOLIC BLOOD PRESSURE: 70 MMHG | OXYGEN SATURATION: 100 % | HEART RATE: 77 BPM | SYSTOLIC BLOOD PRESSURE: 112 MMHG | BODY MASS INDEX: 27.79 KG/M2

## 2025-06-27 DIAGNOSIS — E78.5 HYPERLIPIDEMIA LDL GOAL <100: ICD-10-CM

## 2025-06-27 DIAGNOSIS — I10 ESSENTIAL HYPERTENSION: ICD-10-CM

## 2025-06-27 DIAGNOSIS — H91.93 BILATERAL HEARING LOSS, UNSPECIFIED HEARING LOSS TYPE: ICD-10-CM

## 2025-06-27 DIAGNOSIS — E79.0 HYPERURICEMIA: ICD-10-CM

## 2025-06-27 DIAGNOSIS — K43.2 INCISIONAL HERNIA, WITHOUT OBSTRUCTION OR GANGRENE: ICD-10-CM

## 2025-06-27 DIAGNOSIS — I48.0 PAROXYSMAL ATRIAL FIBRILLATION: ICD-10-CM

## 2025-06-27 DIAGNOSIS — E03.8 SUBCLINICAL HYPOTHYROIDISM: ICD-10-CM

## 2025-06-27 DIAGNOSIS — I70.0 ATHEROSCLEROSIS OF AORTA: ICD-10-CM

## 2025-06-27 DIAGNOSIS — Z85.828 HISTORY OF BASAL CELL CARCINOMA: ICD-10-CM

## 2025-06-27 DIAGNOSIS — E11.65 TYPE 2 DIABETES MELLITUS WITH HYPERGLYCEMIA, WITHOUT LONG-TERM CURRENT USE OF INSULIN: ICD-10-CM

## 2025-06-27 DIAGNOSIS — Z87.19 HISTORY OF PANCREATITIS: ICD-10-CM

## 2025-06-27 DIAGNOSIS — Z00.00 ENCOUNTER FOR ANNUAL PHYSICAL EXAM: Primary | ICD-10-CM

## 2025-06-27 LAB
ABSOLUTE EOSINOPHIL (OHS): 0.23 K/UL
ABSOLUTE MONOCYTE (OHS): 0.68 K/UL (ref 0.3–1)
ABSOLUTE NEUTROPHIL COUNT (OHS): 4.64 K/UL (ref 1.8–7.7)
ALBUMIN SERPL BCP-MCNC: 4.6 G/DL (ref 3.5–5.2)
ALP SERPL-CCNC: 54 UNIT/L (ref 40–150)
ALT SERPL W/O P-5'-P-CCNC: 23 UNIT/L (ref 10–44)
ANION GAP (OHS): 10 MMOL/L (ref 8–16)
AST SERPL-CCNC: 22 UNIT/L (ref 11–45)
BASOPHILS # BLD AUTO: 0.07 K/UL
BASOPHILS NFR BLD AUTO: 1 %
BILIRUB SERPL-MCNC: 0.8 MG/DL (ref 0.1–1)
BUN SERPL-MCNC: 16 MG/DL (ref 8–23)
CALCIUM SERPL-MCNC: 9.8 MG/DL (ref 8.7–10.5)
CHLORIDE SERPL-SCNC: 103 MMOL/L (ref 95–110)
CHOLEST SERPL-MCNC: 134 MG/DL (ref 120–199)
CHOLEST/HDLC SERPL: 3 {RATIO} (ref 2–5)
CO2 SERPL-SCNC: 29 MMOL/L (ref 23–29)
CREAT SERPL-MCNC: 0.9 MG/DL (ref 0.5–1.4)
EAG (OHS): 126 MG/DL (ref 68–131)
ERYTHROCYTE [DISTWIDTH] IN BLOOD BY AUTOMATED COUNT: 13.8 % (ref 11.5–14.5)
GFR SERPLBLD CREATININE-BSD FMLA CKD-EPI: >60 ML/MIN/1.73/M2
GLUCOSE SERPL-MCNC: 111 MG/DL (ref 70–110)
HBA1C MFR BLD: 6 % (ref 4–5.6)
HCT VFR BLD AUTO: 42.9 % (ref 40–54)
HDLC SERPL-MCNC: 44 MG/DL (ref 40–75)
HDLC SERPL: 32.8 % (ref 20–50)
HGB BLD-MCNC: 14 GM/DL (ref 14–18)
IMM GRANULOCYTES # BLD AUTO: 0.02 K/UL (ref 0–0.04)
IMM GRANULOCYTES NFR BLD AUTO: 0.3 % (ref 0–0.5)
LDLC SERPL CALC-MCNC: 64.8 MG/DL (ref 63–159)
LYMPHOCYTES # BLD AUTO: 1.72 K/UL (ref 1–4.8)
MCH RBC QN AUTO: 28.7 PG (ref 27–31)
MCHC RBC AUTO-ENTMCNC: 32.6 G/DL (ref 32–36)
MCV RBC AUTO: 88 FL (ref 82–98)
NONHDLC SERPL-MCNC: 90 MG/DL
NUCLEATED RBC (/100WBC) (OHS): 0 /100 WBC
PLATELET # BLD AUTO: 203 K/UL (ref 150–450)
PMV BLD AUTO: 12.7 FL (ref 9.2–12.9)
POTASSIUM SERPL-SCNC: 3.9 MMOL/L (ref 3.5–5.1)
PROT SERPL-MCNC: 7.9 GM/DL (ref 6–8.4)
RBC # BLD AUTO: 4.88 M/UL (ref 4.6–6.2)
RELATIVE EOSINOPHIL (OHS): 3.1 %
RELATIVE LYMPHOCYTE (OHS): 23.4 % (ref 18–48)
RELATIVE MONOCYTE (OHS): 9.2 % (ref 4–15)
RELATIVE NEUTROPHIL (OHS): 63 % (ref 38–73)
SODIUM SERPL-SCNC: 142 MMOL/L (ref 136–145)
T4 FREE SERPL-MCNC: 0.91 NG/DL (ref 0.71–1.51)
TRIGL SERPL-MCNC: 126 MG/DL (ref 30–150)
TSH SERPL-ACNC: 4.63 UIU/ML (ref 0.4–4)
URATE SERPL-MCNC: 6.2 MG/DL (ref 3.4–7)
WBC # BLD AUTO: 7.36 K/UL (ref 3.9–12.7)

## 2025-06-27 PROCEDURE — 83036 HEMOGLOBIN GLYCOSYLATED A1C: CPT

## 2025-06-27 PROCEDURE — 82465 ASSAY BLD/SERUM CHOLESTEROL: CPT

## 2025-06-27 PROCEDURE — 84550 ASSAY OF BLOOD/URIC ACID: CPT

## 2025-06-27 PROCEDURE — 82374 ASSAY BLOOD CARBON DIOXIDE: CPT

## 2025-06-27 PROCEDURE — 84439 ASSAY OF FREE THYROXINE: CPT

## 2025-06-27 PROCEDURE — 99999 PR PBB SHADOW E&M-EST. PATIENT-LVL IV: CPT | Mod: PBBFAC,,, | Performed by: INTERNAL MEDICINE

## 2025-06-27 PROCEDURE — 84443 ASSAY THYROID STIM HORMONE: CPT

## 2025-06-27 PROCEDURE — 36415 COLL VENOUS BLD VENIPUNCTURE: CPT

## 2025-06-27 PROCEDURE — 85025 COMPLETE CBC W/AUTO DIFF WBC: CPT

## 2025-06-27 RX ORDER — LOSARTAN POTASSIUM 25 MG/1
12.5 TABLET ORAL DAILY
Qty: 45 TABLET | Refills: 1 | Status: SHIPPED | OUTPATIENT
Start: 2025-06-27

## 2025-06-27 NOTE — TELEPHONE ENCOUNTER
Refill Decision Note   Ezekiel Ervin  is requesting a refill authorization.  Brief Assessment and Rationale for Refill:  Approve     Medication Therapy Plan:        Comments:     Note composed:2:39 PM 06/27/2025

## 2025-06-27 NOTE — TELEPHONE ENCOUNTER
No care due was identified.  [facility] Embedded Care Due Messages. Reference number: [id number].   6/27/2025 8:30:45 AM CDT

## 2025-06-27 NOTE — PROGRESS NOTES
Subjective:      History of Present Illness    CHIEF COMPLAINT:  Ezekiel presents for a follow-up visit to discuss various chronic conditions including high blood pressure, cholesterol, diabetes, and a history of gout and skin cancers.    HPI:  Ezekiel reports a gout flare in his right foot, specifically in his little toe, a new location for him. He was evaluated at an urgent care facility and given colchicine, which resolved the issue quickly. He continues to have some residual pain in the affected area.    He has a hernia that is becoming more bothersome, particularly after eating. He has discomfort and pressure in the area when he eats or when gas accumulates. The hernia is visible when he lies down or leans forward, causing protrusion.    About 2 weeks ago, he had what he describes as possibly a virus, during which all his joints, including knees, shoulders, and elbows, were painful. This episode has since resolved.    He has ongoing vision issues with his left eye, with severely impaired vision that has been present for a few years but has worsened in the last 2-3 years. He has a history of trauma to his left eye in 1987 when it was displaced from the socket. He is scheduled to see an ophthalmologist in about 2 weeks to address this concern.    He wears sunglasses frequently, even indoors, due to light sensitivity. He denies any new changes to his vision but acknowledges ongoing issues with his left eye.    Regarding his hearing, he has some difficulties. He can hear the TV well but sometimes struggles to understand conversations when there are background noises. He occasionally needs to ask people to repeat themselves, though he believes this might be habitual rather than a significant hearing issue.    He denies any blurry vision, double vision, depression, or significant hearing loss. He also denies any new or worsening symptoms related to his chronic conditions such as diabetes or atrial  "fibrillation.    MEDICAL HISTORY:  Ezekiel has a history of hypertension, hypercholesterolemia, diabetes, pancreatitis, skin cancers, gout, atrial fibrillation, and hernia. He also sustained a left eye injury in 1987, resulting in decreased vision.    SURGICAL HISTORY:  Ezekiel has undergone cataract surgery on his right eye and AVM surgery.      ROS:  Eyes: +photophobia, +loss of vision  ENT: +difficulty hearing  Gastrointestinal: +abdominal pain, +indigestion, +hernias  Musculoskeletal: +joint pain, +limb pain  Psychiatric: -depression        Past medical history, surgical history, and family medical history reviewed and updated as appropriate.    Medications and allergies reviewed.     Objective:          Vitals:    06/27/25 1037   BP: 112/70   BP Location: Right arm   Patient Position: Sitting   Pulse: 77   SpO2: 100%   Weight: 95.1 kg (209 lb 10.5 oz)   Height: 6' 1" (1.854 m)     Body mass index is 27.66 kg/m².  Physical Exam  Constitutional:       General: He is not in acute distress.     Appearance: He is well-developed.   HENT:      Head: Normocephalic and atraumatic.      Nose: Nose normal.   Eyes:      General: No scleral icterus.     Extraocular Movements: Extraocular movements intact.   Neck:      Thyroid: No thyromegaly.      Vascular: No JVD.      Trachea: No tracheal deviation.   Cardiovascular:      Rate and Rhythm: Normal rate and regular rhythm.      Heart sounds: Normal heart sounds. No murmur heard.     No friction rub. No gallop.   Pulmonary:      Effort: Pulmonary effort is normal. No respiratory distress.      Breath sounds: Normal breath sounds. No wheezing or rales.   Abdominal:      General: Bowel sounds are normal. There is no distension.      Palpations: Abdomen is soft. There is no mass.      Tenderness: There is no abdominal tenderness.   Musculoskeletal:         General: No tenderness. Normal range of motion.      Cervical back: Normal range of motion and neck supple.   Lymphadenopathy: "      Cervical: No cervical adenopathy.   Skin:     General: Skin is warm and dry.      Findings: No rash.   Neurological:      Mental Status: He is alert and oriented to person, place, and time.      Cranial Nerves: No cranial nerve deficit.      Deep Tendon Reflexes: Reflexes normal.   Psychiatric:         Behavior: Behavior normal.         Lab Results   Component Value Date    WBC 7.95 08/10/2024    HGB 13.4 (L) 08/10/2024    HCT 41.2 08/10/2024     08/10/2024    CHOL 126 04/22/2024    TRIG 99 04/22/2024    HDL 47 04/22/2024    ALT 29 04/22/2024    AST 23 04/22/2024     12/20/2024    K 3.9 12/20/2024     12/20/2024    CREATININE 1.0 12/20/2024    BUN 16 12/20/2024    CO2 30 (H) 12/20/2024    TSH 3.267 10/20/2023    PSA 0.80 04/23/2021    INR 1.3 (H) 07/09/2022    HGBA1C 5.9 (H) 12/20/2024       Assessment:       1. Encounter for annual physical exam    2. Paroxysmal atrial fibrillation    3. Essential hypertension    4. Hyperlipidemia LDL goal <100    5. Type 2 diabetes mellitus with hyperglycemia, without long-term current use of insulin    6. Hyperuricemia    7. Subclinical hypothyroidism    8. History of pancreatitis    9. Incisional hernia, without obstruction or gangrene    10. Atherosclerosis of aorta    11. History of basal cell carcinoma    12. Bilateral hearing loss, unspecified hearing loss type          Plan:     Ezekiel was seen today for follow-up.    Diagnoses and all orders for this visit:    Encounter for annual physical exam    Paroxysmal atrial fibrillation  Comments:  on xarelto 20, sotalol 80 bid; asymptomatic. follows EP cardiology  Orders:  -     TSH; Future    Essential hypertension  Comments:  on amlodipine 10, HCTZ 25, losartan 25, sotalol 80 bid; controlled, no changes to current management.  Orders:  -     CBC Auto Differential; Future  -     Comprehensive Metabolic Panel; Future  -     TSH; Future    Hyperlipidemia LDL goal <100  Comments:  on atorvastatin 40, recheck  lipid panel.  Orders:  -     TSH; Future  -     Lipid Panel; Future    Type 2 diabetes mellitus with hyperglycemia, without long-term current use of insulin  Comments:  last a1c 5.9, on metformin 500 bid.  Orders:  -     Hemoglobin A1C; Future    Hyperuricemia  Comments:  on allopurinol 100; recent gout flare, treated with colchicine with relief of symptoms  Orders:  -     Uric Acid; Future    Subclinical hypothyroidism  Comments:  recheck thyroid function    History of pancreatitis  Comments:  asymptomatic at this time    Incisional hernia, without obstruction or gangrene  Comments:  reports postprandrial pain; notes bulge in area of previous incision. would like to be evaluated / treated.  Orders:  -     Ambulatory referral/consult to General Surgery; Future    Atherosclerosis of aorta    History of basal cell carcinoma  Comments:  follows dermatology for annual skin checks    Bilateral hearing loss, unspecified hearing loss type  -     Ambulatory referral/consult to Audiology; Future      Health maintenance reviewed with patient.     Follow up in about 6 months (around 12/27/2025).    Ye Dudley MD  Internal Medicine / Primary Care  [facility]  6/27/2025    This note was generated with the assistance of ambient listening technology. Verbal consent was obtained by the patient and accompanying visitor(s) for the recording of patient appointment to facilitate this note. I attest to having reviewed and edited the generated note for accuracy, though some syntax or spelling errors may persist. Please contact the author of this note for any clarification.

## 2025-06-27 NOTE — PATIENT INSTRUCTIONS
Schedule fasting labwork.   Schedule audiology appointment for evaluation of hearing loss.   Schedule general surgery appointment for evaluation of hernia.     Return to clinic in 6 months or sooner if needed.

## 2025-06-30 ENCOUNTER — CLINICAL SUPPORT (OUTPATIENT)
Dept: AUDIOLOGY | Facility: CLINIC | Age: 80
End: 2025-06-30
Payer: MEDICARE

## 2025-06-30 ENCOUNTER — OFFICE VISIT (OUTPATIENT)
Dept: SURGERY | Facility: CLINIC | Age: 80
End: 2025-06-30
Payer: MEDICARE

## 2025-06-30 VITALS
WEIGHT: 211.75 LBS | SYSTOLIC BLOOD PRESSURE: 150 MMHG | DIASTOLIC BLOOD PRESSURE: 72 MMHG | HEART RATE: 54 BPM | HEIGHT: 73 IN | BODY MASS INDEX: 28.06 KG/M2

## 2025-06-30 DIAGNOSIS — R10.13 DYSPEPSIA: ICD-10-CM

## 2025-06-30 DIAGNOSIS — H90.3 SENSORINEURAL HEARING LOSS (SNHL), BILATERAL: Primary | ICD-10-CM

## 2025-06-30 DIAGNOSIS — M62.08 RECTUS DIASTASIS: Primary | ICD-10-CM

## 2025-06-30 DIAGNOSIS — H91.93 BILATERAL HEARING LOSS, UNSPECIFIED HEARING LOSS TYPE: ICD-10-CM

## 2025-06-30 PROCEDURE — 92557 COMPREHENSIVE HEARING TEST: CPT | Mod: HCNC,S$GLB,,

## 2025-06-30 PROCEDURE — 99999 PR PBB SHADOW E&M-EST. PATIENT-LVL I: CPT | Mod: PBBFAC,,,

## 2025-06-30 PROCEDURE — 92567 TYMPANOMETRY: CPT | Mod: HCNC,S$GLB,,

## 2025-06-30 PROCEDURE — 99999 PR PBB SHADOW E&M-EST. PATIENT-LVL III: CPT | Mod: PBBFAC,,, | Performed by: SURGERY

## 2025-06-30 NOTE — PROGRESS NOTES
6/30/2025    Audiologic Evaluation    Ezekiel Mueller was seen today in the clinic for an audiologic evaluation.  Patient's main complaint was decreased hearing, bilaterally. Mr. Mueller reported a history of occupational (i.e. service in the [employer] ) noise exposure, without consistent use of hearing protection when in noise.. He denied tinnitus, otalgia, aural fullness, and true vertigo.    Tympanometry revealed Type A in the right ear and Type Ad in the left ear.     Audiogram results revealed a mild to profound sensorineural hearing loss (SNHL), bilaterally.    Speech reception thresholds were noted at 25 dBHL in the right ear and 30 dBHL in the left ear.    Speech discrimination scores were 88% in the right ear and 80% in the left ear.    Today's results were discussed with the patient and hearing aids were recommended bilaterally. Mr. Mueller has insurance benefits for hearing aids through [employer] (). We also discussed that he may be eligible [facility], as he has seen them in the past before switching to [employer]. We discussed the advantages and disadvantages of amplification. Mr. Mueller reported he would like more time to consider amplification before scheduling an appointment. The risks of auditory depravation and the importance of annual audiologic follow-up were discussed in detail. Mr. Mueller was instructed to call the clinic at [phone] when he is due for his annual audiogram or if he should choose to pursue amplification. Mr. Mueller expressed understanding of today's results and the plan.    Recommendations:  Otologic evaluation  Hearing aid consultation  Annual audiogram or sooner if change perceived  Hearing protection in noise

## 2025-06-30 NOTE — PROGRESS NOTES
"General Surgery  Initial Consult Visit    Chief Complaint: ventral hernia    HPI:  Patient is a 79 y.o. male, referred by Dr. Ye Dudley, for evaluation of a hernia. He first noted a bulge to the upper abdomen about 3-4 years ago; feels it is about the same size / "may have gotten smaller". History of lap william about 10 years ago for stones.   Recently he notes upper abdominal discomfort after meals; achy; feels like it's on the inside rather than abdominal wall; intensity varies; worse with larger meals; lasts about an hour; no alleviating factors. Feels bloated up. Normal bowel movements. No heartburn or acid brash symptoms.   PMHx includes DM well controlled on metformin (last A1c 5.9%), HTN, HLD and afib (on xarelto and sotalol; follows with EP).  Recent note from Dr Dudley reviewed.  CT from 2022 reviewed and did not demonstrate a hernia.    Medical History:  Past Medical History:   Diagnosis Date    Basal cell carcinoma 02/2015    R eyelid    Basal cell carcinoma (BCC) of back 02/07/2024    right upper back    Basal cell carcinoma, arm, right 02/07/2024    right arm    Borderline diabetes 2016    Colon polyps     [facility], none on repeat    Elevated blood uric acid level 2016    Fatty liver     Hypertension     diagnosed 2005    Pancreatitis 07/2015    etiology unclear     Surgical History:  Past Surgical History:   Procedure Laterality Date    CATARACT EXTRACTION W/  INTRAOCULAR LENS IMPLANT Right 3/14/2024    Procedure: EXTRACTION, CATARACT, WITH IOL INSERTION;  Surgeon: Tj Lopez MD;  Location: [facility];  Service: Ophthalmology;  Laterality: Right;    CHOLECYSTECTOMY  2012    COLONOSCOPY      x2    COLONOSCOPY N/A 7/30/2021    Procedure: COLONOSCOPY;  Surgeon: Hector Berry MD;  Location: [address]);  Service: Endoscopy;  Laterality: N/A;  fully vaccinated-see immunization record    SKIN CANCER EXCISION       Social History:  Social History[1]    Review of " "Systems:  See HPI    Physical Exam:  BP (!) 150/72 (BP Location: Left arm, Patient Position: Sitting)   Pulse (!) 54   Ht 6' 1" (1.854 m)   Wt 96.1 kg (211 lb 12 oz)   BMI 27.94 kg/m²   Physical Exam  Vitals reviewed.   Constitutional:       General: He is not in acute distress.     Appearance: He is normal weight. He is not ill-appearing.   Pulmonary:      Effort: Pulmonary effort is normal. No respiratory distress.   Chest:      Chest wall: Tenderness: flat umbilicus without obvious fascia defect. diastasis superior to the umbilicus. healed prior lap william incisions..   Abdominal:      Palpations: Abdomen is soft.      Tenderness: There is no abdominal tenderness.      Hernia: No hernia is present.       Neurological:      General: No focal deficit present.      Mental Status: He is alert and oriented to person, place, and time.   Psychiatric:         Mood and Affect: Mood normal.         Behavior: Behavior normal.       Assessment/Plan:  Diagnoses and all orders for this visit:    Rectus diastasis  Comments:  reports postprandrial pain; notes bulge in area of previous incision. would like to be evaluated / treated.    4cm diastasis. No obvious hernia. This is consistent with the CT imaging from 2022 where I measured the diastasis at 3cm.  Reassurance provided. [employer] exercise handout provided.  Offered referral to plastics if this continues to be a bother but at this time declined.    Dyspepsia  -     Ambulatory referral/consult to Gastroenterology; Future  Given discomfort in the epigastrium that is always postprandial, I do feel further evaluation by GI would be reasonable.      Todd Pope MD FACS  Minimally Invasive General & Bariatric Surgery  [facility] [place], [place]         [1]   Social History  Tobacco Use    Smoking status: Never    Smokeless tobacco: Never   Substance Use Topics    Alcohol use: Not Currently    Drug use: No     "

## 2025-07-24 DIAGNOSIS — E79.0 HYPERURICEMIA: ICD-10-CM

## 2025-07-24 RX ORDER — ALLOPURINOL 100 MG/1
100 TABLET ORAL
Qty: 90 TABLET | Refills: 3 | Status: SHIPPED | OUTPATIENT
Start: 2025-07-24

## 2025-07-24 NOTE — TELEPHONE ENCOUNTER
No care due was identified.  [facility] Embedded Care Due Messages. Reference number: [id number].   7/24/2025 10:56:07 AM CDT

## 2025-07-25 ENCOUNTER — OFFICE VISIT (OUTPATIENT)
Dept: URGENT CARE | Facility: CLINIC | Age: 80
End: 2025-07-25
Payer: MEDICARE

## 2025-07-25 VITALS
SYSTOLIC BLOOD PRESSURE: 134 MMHG | RESPIRATION RATE: 16 BRPM | OXYGEN SATURATION: 96 % | BODY MASS INDEX: 28.08 KG/M2 | HEIGHT: 73 IN | HEART RATE: 60 BPM | WEIGHT: 211.88 LBS | DIASTOLIC BLOOD PRESSURE: 74 MMHG | TEMPERATURE: 98 F

## 2025-07-25 DIAGNOSIS — M70.22 OLECRANON BURSITIS OF LEFT ELBOW: Primary | ICD-10-CM

## 2025-07-25 RX ORDER — KETOROLAC TROMETHAMINE 30 MG/ML
30 INJECTION, SOLUTION INTRAMUSCULAR; INTRAVENOUS
Status: COMPLETED | OUTPATIENT
Start: 2025-07-25 | End: 2025-07-25

## 2025-07-25 RX ORDER — DEXAMETHASONE SODIUM PHOSPHATE 10 MG/ML
10 INJECTION INTRAMUSCULAR; INTRAVENOUS ONCE
Status: COMPLETED | OUTPATIENT
Start: 2025-07-25 | End: 2025-07-25

## 2025-07-25 RX ORDER — PREDNISONE 20 MG/1
20 TABLET ORAL DAILY
Qty: 4 TABLET | Refills: 0 | Status: SHIPPED | OUTPATIENT
Start: 2025-07-25

## 2025-07-25 RX ADMIN — DEXAMETHASONE SODIUM PHOSPHATE 10 MG: 10 INJECTION INTRAMUSCULAR; INTRAVENOUS at 03:07

## 2025-07-25 RX ADMIN — KETOROLAC TROMETHAMINE 30 MG: 30 INJECTION, SOLUTION INTRAMUSCULAR; INTRAVENOUS at 03:07

## 2025-07-25 NOTE — PATIENT INSTRUCTIONS
GO TO THE EMERGENCY DEPARTMENT IF YOU GET WORSE OR FOR ANY CONCERNS OR NEEDS OR RETURN TO URGENT CARE CLINIC

## 2025-07-25 NOTE — PROGRESS NOTES
"Subjective:      Patient ID: Ezekiel Mueller is a 79 y.o. male.    Vitals:  height is 6' 1" (1.854 m) and weight is 96.1 kg (211 lb 13.8 oz). His oral temperature is 97.8 °F (36.6 °C). His blood pressure is 134/74 and his pulse is 60. His respiration is 16 and oxygen saturation is 96%.     Chief Complaint: Joint Swelling    Pt complains of elbow pain and swelling. Pt states that symptoms started yesterday.  Patient has a history of gout.  He denies trauma injury.  No fever chills.  He does move his elbow without decreased range of motion    Pain  This is a new problem. The current episode started yesterday. The problem has been unchanged. Associated symptoms include arthralgias and joint swelling. He has tried nothing for the symptoms.       Musculoskeletal:  Positive for pain, joint pain, joint swelling, arthritis and gout.   Skin:  Negative for erythema.      Objective:     Physical Exam   Constitutional: He is oriented to person, place, and time. He appears well-developed.   HENT:   Head: Normocephalic and atraumatic. Head is without abrasion, without contusion and without laceration.   Ears:   Right Ear: External ear normal.   Left Ear: External ear normal.   Nose: Nose normal.   Mouth/Throat: Oropharynx is clear and moist and mucous membranes are normal.   Eyes: Conjunctivae, EOM and lids are normal. Pupils are equal, round, and reactive to light.   Neck: Trachea normal and phonation normal. Neck supple.   Cardiovascular: Normal rate, regular rhythm and normal heart sounds.   Pulmonary/Chest: Effort normal. No stridor. No respiratory distress.   Musculoskeletal: Normal range of motion.         General: Swelling and tenderness present. Normal range of motion.      Comments: There is some mild swelling and tenderness palpation of the left elbow with some mild overlying erythema.  There is no effusion or abscess.  Range of motion of the elbow was fully intact.  No compartment compromise.  Distal sensory motor " vascular intact.  No redness extending up arm and no axillary adenopathy   Neurological: He is alert and oriented to person, place, and time.   Skin: Skin is warm, dry, intact and no rash. Capillary refill takes less than 2 seconds. No abrasion, No burn, No bruising, No erythema and No ecchymosis   Psychiatric: His speech is normal and behavior is normal. Judgment and thought content normal.   Nursing note and vitals reviewed.      Assessment:     1. Olecranon bursitis of left elbow        Plan:       Olecranon bursitis of left elbow  -     dexAMETHasone injection 10 mg  -     ketorolac injection 30 mg  -     predniSONE (DELTASONE) 20 MG tablet; Take 1 tablet (20 mg total) by mouth once daily.  Dispense: 4 tablet; Refill: 0      Follow up if symptoms worsen or fail to improve, for F/U with PCP or ED.   Patient Instructions   GO TO THE EMERGENCY DEPARTMENT IF YOU GET WORSE OR FOR ANY CONCERNS OR NEEDS OR RETURN TO URGENT CARE CLINIC   Follow up if symptoms worsen or fail to improve, for F/U with PCP or ED.   Patient Instructions   GO TO THE EMERGENCY DEPARTMENT IF YOU GET WORSE OR FOR ANY CONCERNS OR NEEDS OR RETURN TO URGENT CARE CLINIC

## 2025-07-25 NOTE — TELEPHONE ENCOUNTER
Refill Decision Note   Ezekiel Ervin  is requesting a refill authorization.  Brief Assessment and Rationale for Refill:  Approve     Medication Therapy Plan:         Comments:     Note composed:11:46 PM 07/24/2025

## 2025-08-08 ENCOUNTER — OFFICE VISIT (OUTPATIENT)
Dept: GASTROENTEROLOGY | Facility: CLINIC | Age: 80
End: 2025-08-08
Payer: MEDICARE

## 2025-08-08 ENCOUNTER — TELEPHONE (OUTPATIENT)
Dept: ENDOSCOPY | Facility: HOSPITAL | Age: 80
End: 2025-08-08
Payer: MEDICARE

## 2025-08-08 VITALS
HEIGHT: 73 IN | BODY MASS INDEX: 28.34 KG/M2 | DIASTOLIC BLOOD PRESSURE: 74 MMHG | HEART RATE: 59 BPM | WEIGHT: 213.88 LBS | SYSTOLIC BLOOD PRESSURE: 132 MMHG

## 2025-08-08 VITALS — HEIGHT: 73 IN | BODY MASS INDEX: 28.34 KG/M2 | WEIGHT: 213.88 LBS

## 2025-08-08 DIAGNOSIS — Z12.11 SCREEN FOR COLON CANCER: Primary | ICD-10-CM

## 2025-08-08 DIAGNOSIS — Z86.0100 HISTORY OF COLON POLYPS: ICD-10-CM

## 2025-08-08 DIAGNOSIS — R10.13 DYSPEPSIA: Primary | ICD-10-CM

## 2025-08-08 DIAGNOSIS — Z86.0100 HISTORY OF COLON POLYPS: Primary | ICD-10-CM

## 2025-08-08 DIAGNOSIS — R19.7 DIARRHEA, UNSPECIFIED TYPE: ICD-10-CM

## 2025-08-08 PROCEDURE — 99999 PR PBB SHADOW E&M-EST. PATIENT-LVL IV: CPT | Mod: PBBFAC,HCNC,,

## 2025-08-08 RX ORDER — SODIUM, POTASSIUM,MAG SULFATES 17.5-3.13G
1 SOLUTION, RECONSTITUTED, ORAL ORAL DAILY
Qty: 1 KIT | Refills: 0 | Status: SHIPPED | OUTPATIENT
Start: 2025-09-01 | End: 2025-09-03

## 2025-08-08 NOTE — PROGRESS NOTES
Gastroenterology Clinic Consultation Note    Patient ID: Ezekiel Mueller is a 79 y.o. male.        Chief Complaint: dyspepsia    CHIEF COMPLAINT:  Patient presents today for follow up of abdominal discomfort.    GI HISTORY:  Referred by general surgery for dyspepsia. He reports previous upper abdominal discomfort with bloating lasting approximately one hour after consuming peach trent green tea, which has resolved since discontinuing the beverage. He experiences chronic diarrhea with occasionally soft stools associated with Metformin use. He had a colonoscopy in 2012 showing three small polyps and is aware of needed follow-up. He denies bloody stools, abdominal pain, constipation, heartburn, chest pain, burning in stomach/throat, or difficulty/painful swallowing.    He reports weight loss attributed to Metformin and exercising. He previously exercised but discontinued due to arm discomfort, with intentions to resume physical activity.      ROS:  Review of Systems   Constitutional:  Negative for chills and fever.   Respiratory:  Negative for cough and shortness of breath.    Cardiovascular:  Negative for chest pain.   Gastrointestinal:  Positive for diarrhea. Negative for abdominal pain, blood in stool, constipation, heartburn, melena, nausea and vomiting.   Neurological:  Negative for seizures and loss of consciousness.        Medical History:  has a past medical history of Basal cell carcinoma (02/2015), Basal cell carcinoma (BCC) of back (02/07/2024), Basal cell carcinoma, arm, right (02/07/2024), Borderline diabetes (2016), Colon polyps, Elevated blood uric acid level (2016), Fatty liver, Hypertension, and Pancreatitis (07/2015).    Surgical History:  has a past surgical history that includes Skin cancer excision; Cholecystectomy (2012); Colonoscopy; Colonoscopy (N/A, 7/30/2021); and Cataract extraction w/  intraocular lens implant (Right, 3/14/2024).    Family History: family history includes  "Diabetes in his mother; Emphysema in his father; Heart disease in his mother; Hypertension in his mother; No Known Problems in his daughter, daughter, maternal aunt, maternal grandfather, maternal grandmother, maternal uncle, paternal aunt, paternal grandfather, paternal grandmother, paternal uncle, sister, son, and son..       Review of patient's allergies indicates:  No Known Allergies    Medications Ordered Prior to Encounter[1]    Labs:  Lab Results   Component Value Date    WBC 7.36 06/27/2025    HGB 14.0 06/27/2025    HCT 42.9 06/27/2025     06/27/2025    CRP 12.3 (H) 08/10/2024    CHOL 134 06/27/2025    TRIG 126 06/27/2025    HDL 44 06/27/2025    ALKPHOS 54 06/27/2025    LIPASE >1000 (H) 07/22/2015    ALT 23 06/27/2025    AST 22 06/27/2025     06/27/2025    K 3.9 06/27/2025     06/27/2025    CREATININE 0.9 06/27/2025    BUN 16 06/27/2025    CO2 29 06/27/2025    TSH 4.634 (H) 06/27/2025    PSA 0.80 04/23/2021    INR 1.3 (H) 07/09/2022    HGBA1C 6.0 (H) 06/27/2025       Vital Signs:  /74 (BP Location: Left arm, Patient Position: Sitting)   Pulse (!) 59   Ht 6' 1" (1.854 m)   Wt 97 kg (213 lb 13.5 oz)   BMI 28.21 kg/m²   Body mass index is 28.21 kg/m².    Physical Exam:  Physical Exam  Vitals and nursing note reviewed.   Constitutional:       General: He is not in acute distress.     Appearance: Normal appearance. He is not ill-appearing.   HENT:      Head: Normocephalic and atraumatic.   Eyes:      Extraocular Movements: Extraocular movements intact.   Cardiovascular:      Rate and Rhythm: Normal rate and regular rhythm.   Pulmonary:      Effort: Pulmonary effort is normal. No respiratory distress.   Abdominal:      General: Bowel sounds are normal. There is no distension.      Palpations: Abdomen is soft.      Tenderness: There is no abdominal tenderness.   Neurological:      Mental Status: He is alert and oriented to person, place, and time.         Imaging reviewed: CT chest " abdomen pelvis 07/09/2022  FINDINGS:  The structures at the base of the neck are unremarkable.  No significant mediastinal lymphadenopathy.  The heart is not enlarged.  The thoracic aorta tapers normally noting atherosclerotic calcification along its course and in the distribution of the coronary arteries.  No pericardial effusion.     There is web like secretion within the proximal aspect of the right upper lobe bronchus, the airways are otherwise patent.  There is minimal biapical atelectasis.  There is bilateral basilar dependent atelectasis/scarring.  No large focal consolidation.  No pneumothorax.  No pleural effusion.  Bolus timing is not optimized for evaluation of the pulmonary arteries.  Allowing for this, no pulmonary arterial filling defect to the level of the proximal lobar arteries bilaterally.     The liver, spleen, pancreas and adrenal glands are grossly unremarkable.  The gallbladder is surgically absent.  No significant biliary dilation or ascites.  The stomach is decompressed without wall thickening.  No abnormal perisplenic or perihepatic fluid.  The portal vein, splenic vein, SMV, celiac axis and SMA all are patent.  No significant abdominal lymphadenopathy.     The kidneys enhance symmetrically without hydronephrosis or nephrolithiasis.  Several cysts arise from the kidneys bilaterally, largest within the lower pole of the left kidney measures 6.5 cm.  The bilateral ureters are unremarkable without calculi seen.  The urinary bladder is unremarkable.  The prostate is not enlarged.  No free fluid in the pelvis.     The large bowel is for the most part decompressed.  The terminal ileum and appendix are unremarkable.  The small bowel is grossly unremarkable.  There are a few scattered shotty periaortic and paracaval lymph nodes.  There is atherosclerotic plaque and calcification of the aorta and its branches.  There are bilateral fat containing inguinal hernias.     Degenerative changes are noted of  the bilateral sacroiliac joints and spine.  The facet joints are aligned.     Degenerative changes are noted of the left glenohumeral joint.  There may be posttraumatic change of the left acromioclavicular joint.  There is remote injury of left lateral rib 7.  No significant inguinal lymphadenopathy.  There is slight induration involving the left anterior chest wall soft tissues, correlation with contusion as warranted.     Impression:     1. No acute solid organ injury of the chest, abdomen, or pelvis.  2. Bilateral renal cysts.  3. Please see above for several additional findings.        Electronically signed by:Giorgio Estrada MD  Date:                                            07/09/2022  Time:                                           14:17    Endoscopy reviewed: EUS 07/27/2015  Findings:       Endosonographic Finding :        The Aorta was identified and is followed to the Celiac and        Mesenteric Artery takeoff. They appeared normal without evidence of        lymphadenopathy in the region. The Celiac Artery is then followed        until the pancreas is identified. The pancreatic body and tail are        surveyed from this region.        Pancreatic parenchymal abnormalities were noted in the entire        pancreas. These consisted of hyperechoic strands, hypoechoic foci        and lobularity.        There was evidence of hypoechoic and anechoic areas in the        peripancreatic region which may represent inflammatory changes        secondary to his current pancreatitis.        The pancreas was then surveyed from the duodenal stations.        Parenchymal changes due to acute pancreatitis seen. The pancreatic        duct measured 2.9 mm in this region and appeared normal in its        course. The common bile duct was identified and measured 4.4 mm. The        CBD appeared normal on its course without evidence of defects,        thickening or irregularity.        The portal vein, confluence, superior  mesenteric vein and superior        mesenteric artery appeared endosonographically normal. There were no        abnormal lymph nodes in the region.   Impression:           - Pancreatic parenchymal abnormalities consisting                         of hyperechoic strands, hypoechoic foci and                         lobularity were noted in the entire pancreas. There                         was evidence of hypoechoic and anechoic areas in                         the peripancreatic region which may represent                         inflammatory changes secondary to his current                         pancreatitis.                         - There was no sign of significant pathology in the                         common bile duct.   Recommendation:       - [facility].                         - If no source for his fever identified consider                         repeat CT scan of the abdomen and pelvis   Attending Participation:        I personally performed the entire procedure.   Hugh Luna MD   7/27/2015 12:50:33 PM     Assessment & Plan  1. Dyspepsia    2. Diarrhea, unspecified type    3. History of colon polyps      DYSPEPSIA:  - Resolved    DIARRHEA:  - Started fiber supplement (e.g. Metamucil or Benefiber) and recommended adding supplemental fiber to diet to help manage Metformin-induced diarrhea.    HISTORY OF COLON POLYPS:  - Evaluated appropriateness of colonoscopy at age, weighing risks and benefits given history of colon polyps and last procedure in 2012.  - Explained that colonoscopies are typically discontinued after age 75 due to increased risks, but recommended proceeding due to extended time since last procedure.  - Discussed that this colonoscopy may likely be the last one.  - Ordered colonoscopy.     FOLLOW UP:  - After scope   - As needed or if symptoms worsen     ZEKE CANDELARIAP-C  Gastroenterology Department  [facility]  [facility]  [phone]     This note was generated with the assistance of ambient listening technology. Verbal consent was obtained by the patient and accompanying visitor(s) for the recording of patient appointment to facilitate this note. I attest to having reviewed and edited the generated note for accuracy, though some syntax or spelling errors may persist. Please contact the author of this note for any clarification.          [1]   Current Outpatient Medications on File Prior to Visit   Medication Sig Dispense Refill    allopurinoL (ZYLOPRIM) 100 MG tablet TAKE 1 TABLET ONE TIME DAILY 90 tablet 3    amLODIPine (NORVASC) 10 MG tablet Take 1 tablet (10 mg total) by mouth once daily. For blood pressure 90 tablet 2    atorvastatin (LIPITOR) 40 MG tablet TAKE 1 TABLET ONE TIME DAILY 90 tablet 3    hydroCHLOROthiazide (HYDRODIURIL) 25 MG tablet Take 1 tablet (25 mg total) by mouth once daily. 90 tablet 3    losartan (COZAAR) 25 MG tablet Take 0.5 tablets (12.5 mg total) by mouth once daily. 45 tablet 1    metFORMIN (GLUCOPHAGE) 500 MG tablet Take 1 tablet (500 mg total) by mouth 2 (two) times daily with meals. 180 tablet 0    predniSONE (DELTASONE) 20 MG tablet Take 1 tablet (20 mg total) by mouth once daily. 4 tablet 0    rivaroxaban (XARELTO) 20 mg Tab Take 1 tablet (20 mg total) by mouth Daily. 90 tablet 3    sotaloL (BETAPACE) 80 MG tablet Take 1 tablet (80 mg total) by mouth 2 (two) times daily. 180 tablet 3     No current facility-administered medications on file prior to visit.

## 2025-08-08 NOTE — TELEPHONE ENCOUNTER
Patient is scheduled for a Colonoscopy on 9/8/25 with Dr. MARC Alcaraz  Referral for procedure from [facility]

## 2025-08-08 NOTE — TELEPHONE ENCOUNTER
"----- Message from PEACE Barrera sent at 8/8/2025  8:42 AM CDT -----  Regarding: Colonoscopy  Procedure: Colonoscopy    Diagnosis: Surveillance colonoscopy - Hx of colon polyps    Procedure Timing: Within 12 weeks    #If within 4 weeks selected, please vladimir as high priority#    #If greater than 12 weeks, please select "5-12 weeks" and delay sending until 3 months prior to requested date#     Location: Any Site    Additional Scheduling Information: Blood thinners and Diabetes    Prep Specifications:Standard prep    Is the patient taking a GLP-1 Agonist:no    Have you attached a patient to this message: yes  "

## 2025-08-10 DIAGNOSIS — R73.03 BORDERLINE DIABETES MELLITUS: ICD-10-CM

## 2025-08-11 RX ORDER — METFORMIN HYDROCHLORIDE 500 MG/1
500 TABLET ORAL 2 TIMES DAILY WITH MEALS
Qty: 180 TABLET | Refills: 3 | Status: SHIPPED | OUTPATIENT
Start: 2025-08-11

## 2025-08-22 ENCOUNTER — TELEPHONE (OUTPATIENT)
Dept: ENDOSCOPY | Facility: HOSPITAL | Age: 80
End: 2025-08-22
Payer: MEDICARE

## (undated) DEVICE — Device

## (undated) DEVICE — GLOVE SENSICARE PI SURG 7.5

## (undated) DEVICE — SOL BETADINE 5%

## (undated) DEVICE — SOL POVIDONE SCRUB IODINE 4 OZ